# Patient Record
Sex: FEMALE | Race: WHITE | Employment: OTHER | URBAN - METROPOLITAN AREA
[De-identification: names, ages, dates, MRNs, and addresses within clinical notes are randomized per-mention and may not be internally consistent; named-entity substitution may affect disease eponyms.]

---

## 2017-03-16 ENCOUNTER — TELEPHONE (OUTPATIENT)
Dept: INTERNAL MEDICINE CLINIC | Facility: CLINIC | Age: 72
End: 2017-03-16

## 2017-03-16 RX ORDER — AMLODIPINE BESYLATE 10 MG/1
TABLET ORAL
Qty: 30 TABLET | Refills: 1 | Status: SHIPPED | OUTPATIENT
Start: 2017-03-16 | End: 2017-05-14

## 2017-03-16 NOTE — TELEPHONE ENCOUNTER
Please contact the patient. Needs to make appointment in  next 1 or 2 months. Given 2 refills at this time.

## 2017-03-16 NOTE — TELEPHONE ENCOUNTER
Pt is eligible for a Medicare Annual Health Assessment anytime during the next 12 months. Pt declined stating that she is wanting to change PCP's at this time and will notify insurance company of this.

## 2017-04-14 PROBLEM — F17.201 TOBACCO DEPENDENCE IN REMISSION: Status: ACTIVE | Noted: 2017-04-14

## 2017-04-14 PROBLEM — N18.30 CHRONIC RENAL DISEASE, STAGE III (HCC): Status: ACTIVE | Noted: 2017-04-14

## 2017-04-14 PROBLEM — R32 URINARY INCONTINENCE: Status: ACTIVE | Noted: 2017-04-14

## 2017-04-14 PROBLEM — E78.5 HYPERLIPIDEMIA: Status: ACTIVE | Noted: 2017-04-14

## 2017-05-11 ENCOUNTER — OFFICE VISIT (OUTPATIENT)
Dept: INTERNAL MEDICINE CLINIC | Facility: CLINIC | Age: 72
End: 2017-05-11

## 2017-05-11 VITALS
WEIGHT: 140.5 LBS | HEIGHT: 57.5 IN | DIASTOLIC BLOOD PRESSURE: 74 MMHG | SYSTOLIC BLOOD PRESSURE: 168 MMHG | OXYGEN SATURATION: 95 % | TEMPERATURE: 98 F | BODY MASS INDEX: 29.9 KG/M2 | RESPIRATION RATE: 20 BRPM | HEART RATE: 66 BPM

## 2017-05-11 DIAGNOSIS — H25.13 AGE-RELATED NUCLEAR CATARACT OF BOTH EYES: ICD-10-CM

## 2017-05-11 DIAGNOSIS — J06.9 UPPER RESPIRATORY TRACT INFECTION, UNSPECIFIED TYPE: Primary | ICD-10-CM

## 2017-05-11 PROCEDURE — G0463 HOSPITAL OUTPT CLINIC VISIT: HCPCS | Performed by: INTERNAL MEDICINE

## 2017-05-11 PROCEDURE — 99213 OFFICE O/P EST LOW 20 MIN: CPT | Performed by: INTERNAL MEDICINE

## 2017-05-11 RX ORDER — AZITHROMYCIN 250 MG/1
TABLET, FILM COATED ORAL
Qty: 6 TABLET | Refills: 0 | Status: SHIPPED | OUTPATIENT
Start: 2017-05-11 | End: 2017-05-15

## 2017-05-11 NOTE — PROGRESS NOTES
HPI:    Patient ID: Adam Arias is a 70year old female. HPI  Patient is here complaining of almost 1 week of cough and respiratory infection symptoms. Started with losing her voice and difficulty with throat. She has had chills since the start. Oropharynx is clear and moist.   Eyes: Conjunctivae are normal.   Pulmonary/Chest: Effort normal and breath sounds normal. She has no wheezes. She has no rales. Lymphadenopathy:     She has no cervical adenopathy.               ASSESSMENT/PLAN:   Patient

## 2017-05-15 RX ORDER — AMLODIPINE BESYLATE 10 MG/1
TABLET ORAL
Qty: 30 TABLET | Refills: 5 | Status: SHIPPED | OUTPATIENT
Start: 2017-05-15 | End: 2017-11-12

## 2017-05-15 RX ORDER — LOSARTAN POTASSIUM 100 MG/1
TABLET ORAL
Qty: 30 TABLET | Refills: 5 | Status: SHIPPED | OUTPATIENT
Start: 2017-05-15 | End: 2017-11-12

## 2017-05-15 NOTE — TELEPHONE ENCOUNTER
Hypertensive Medications;  PLEASE ADVISE ON REFILL. THANKS.   Protocol Criteria:  · Appointment scheduled in the past 6 months or in the next 3 months  · BMP or CMP in the past 12 months  · Creatinine result < 2  Recent Visits       Provider Department [facility]

## 2017-05-30 PROBLEM — D69.6 THROMBOCYTOPENIA (HCC): Chronic | Status: ACTIVE | Noted: 2017-05-30

## 2017-05-30 PROBLEM — D69.6 THROMBOCYTOPENIA: Chronic | Status: ACTIVE | Noted: 2017-05-30

## 2017-06-08 ENCOUNTER — OFFICE VISIT (OUTPATIENT)
Dept: INTERNAL MEDICINE CLINIC | Facility: CLINIC | Age: 72
End: 2017-06-08

## 2017-06-08 VITALS
TEMPERATURE: 98 F | BODY MASS INDEX: 30.39 KG/M2 | RESPIRATION RATE: 18 BRPM | DIASTOLIC BLOOD PRESSURE: 80 MMHG | HEIGHT: 57 IN | HEART RATE: 60 BPM | WEIGHT: 140.88 LBS | SYSTOLIC BLOOD PRESSURE: 162 MMHG

## 2017-06-08 DIAGNOSIS — Z00.00 ENCOUNTER FOR ANNUAL HEALTH EXAMINATION: Primary | ICD-10-CM

## 2017-06-08 DIAGNOSIS — R32 URINARY INCONTINENCE, UNSPECIFIED TYPE: ICD-10-CM

## 2017-06-08 DIAGNOSIS — H52.203 HYPEROPIA WITH ASTIGMATISM AND PRESBYOPIA, BILATERAL: ICD-10-CM

## 2017-06-08 DIAGNOSIS — I83.93 VARICOSE VEINS OF BOTH LOWER EXTREMITIES: ICD-10-CM

## 2017-06-08 DIAGNOSIS — E78.5 HYPERLIPIDEMIA, UNSPECIFIED HYPERLIPIDEMIA TYPE: ICD-10-CM

## 2017-06-08 DIAGNOSIS — D69.6 THROMBOCYTOPENIA (HCC): Chronic | ICD-10-CM

## 2017-06-08 DIAGNOSIS — R20.2 NUMBNESS AND TINGLING OF LEFT SIDE OF FACE: ICD-10-CM

## 2017-06-08 DIAGNOSIS — R20.0 NUMBNESS AND TINGLING OF LEFT SIDE OF FACE: ICD-10-CM

## 2017-06-08 DIAGNOSIS — H52.03 HYPEROPIA WITH ASTIGMATISM AND PRESBYOPIA, BILATERAL: ICD-10-CM

## 2017-06-08 DIAGNOSIS — N18.30 CHRONIC RENAL DISEASE, STAGE III (HCC): ICD-10-CM

## 2017-06-08 DIAGNOSIS — H25.13 AGE-RELATED NUCLEAR CATARACT OF BOTH EYES: ICD-10-CM

## 2017-06-08 DIAGNOSIS — I10 ESSENTIAL HYPERTENSION WITH GOAL BLOOD PRESSURE LESS THAN 140/90: ICD-10-CM

## 2017-06-08 DIAGNOSIS — R21 RASH: ICD-10-CM

## 2017-06-08 DIAGNOSIS — H52.4 HYPEROPIA WITH ASTIGMATISM AND PRESBYOPIA, BILATERAL: ICD-10-CM

## 2017-06-08 DIAGNOSIS — H43.391 FLOATER, VITREOUS, RIGHT: ICD-10-CM

## 2017-06-08 PROBLEM — F17.201 TOBACCO DEPENDENCE IN REMISSION: Status: RESOLVED | Noted: 2017-04-14 | Resolved: 2017-06-08

## 2017-06-08 PROCEDURE — 96160 PT-FOCUSED HLTH RISK ASSMT: CPT | Performed by: INTERNAL MEDICINE

## 2017-06-08 NOTE — PATIENT INSTRUCTIONS
Shelby Ortiz's SCREENING SCHEDULE   Tests on this list are recommended by your physician but may not be covered, or covered at this frequency, by your insurer. Please check with your insurance carrier before scheduling to verify coverage.    PREVENTATI family history    Colorectal Cancer Screening  Covered up to Age 76     Colonoscopy Screen   Covered every 10 years- more often if abnormal Colonoscopy,3 Years due on 09/01/2019 Update Health Maintenance if applicable    Flex Sigmoidoscopy Screen  Covered this or any previous visit. Please get once after your 65th birthday    Pneumococcal 23 (Pneumovax)  Covered Once after 65 No orders found for this or any previous visit.  Please get once after your 65th birthday    Hepatitis B for Moderate/High Risk

## 2017-06-08 NOTE — PROGRESS NOTES
HPI:   Laureen Garcia is a 70year old female who presents for a MA (Medicare Advantage) Supervisit (Once per calendar year). Patient is here for Medicare advantage AHA visit and follow-up on chronic medical issues as listed below.   Last seen in the AST 17 10/07/2016   ALT 18 10/07/2016   CA 9.1 10/07/2016   ALB 4.0 10/07/2016   ALB 4.24 10/07/2016   TSH 2.14 10/07/2016   CREATSERUM 1.26 10/07/2016   * 10/07/2016        CBC  (most recent labs)     Lab Results  Component Value Date   WBC 5.4 1 double vision  HEENT: denies nasal congestion, sinus pain or ST  LUNGS: denies shortness of breath with exertion  CARDIOVASCULAR: denies chest pain on exertion  GI: denies abdominal pain, denies heartburn  : denies dysuria, vaginal discharge or itching, responses:    No I avoid social activities because I cannot hear well and fear I will   reply improperly:  No   Family members and friends have told me they think I may have hearing   loss:   Yes             Visual Acuity  Right Eye Visual Acuity: Corrected encounter diagnosis)  Plan: Patient here for Medicare advantage AHA visit. Exam is unremarkable. Multiple active issues as below. Health maintenance issues reviewed.   Advanced directives reviewed.    (I10) Essential hypertension with goal blood pressure Ongoing itchiness and rash. Refer to dermatology. Ms. Denilson Bazan already takes aspirin and has it on her medication list.     Diet assessment: good     Advanced Directive:  Living Will on file in [address]?   Fredda Sever does not have a Living Will on conditions?: 0-No    Have you fallen in the last 12 months?: 0-No    Do you accidently lose urine?: 1-Yes    Do you have difficulty seeing?: 0-No    Do you have any difficulty walking or getting up?: 0-No    Do you have any tripping hazards?: 0-No Maintenance if applicable    Flex Sigmoidoscopy Screen every 10 years No results found for this or any previous visit. No flowsheet data found. Fecal Occult Blood Annually No results found for: FOB No flowsheet data found.     Glaucoma Screening      Op covered with your pharmacy  prescription benefits        SPECIFIC DISEASE MONITORING Internal Lab or Procedure External Lab or Procedure   Annual Monitoring of Persistent     Medications (ACE/ARB, digoxin diuretics, anticonvulsants.)    Potassium  Annually

## 2017-06-09 ENCOUNTER — LAB ENCOUNTER (OUTPATIENT)
Dept: LAB | Facility: HOSPITAL | Age: 72
End: 2017-06-09
Attending: INTERNAL MEDICINE
Payer: MEDICARE

## 2017-06-09 DIAGNOSIS — I10 ESSENTIAL HYPERTENSION WITH GOAL BLOOD PRESSURE LESS THAN 140/90: ICD-10-CM

## 2017-06-09 PROCEDURE — 36415 COLL VENOUS BLD VENIPUNCTURE: CPT

## 2017-06-09 PROCEDURE — 85025 COMPLETE CBC W/AUTO DIFF WBC: CPT

## 2017-06-09 PROCEDURE — 80061 LIPID PANEL: CPT

## 2017-06-09 PROCEDURE — 80053 COMPREHEN METABOLIC PANEL: CPT

## 2017-07-14 ENCOUNTER — APPOINTMENT (OUTPATIENT)
Dept: LAB | Facility: HOSPITAL | Age: 72
End: 2017-07-14
Attending: Other
Payer: MEDICARE

## 2017-07-14 ENCOUNTER — OFFICE VISIT (OUTPATIENT)
Dept: NEUROLOGY | Facility: CLINIC | Age: 72
End: 2017-07-14

## 2017-07-14 ENCOUNTER — HOSPITAL ENCOUNTER (OUTPATIENT)
Dept: GENERAL RADIOLOGY | Facility: HOSPITAL | Age: 72
Discharge: HOME | End: 2017-07-14
Attending: Other
Payer: MEDICARE

## 2017-07-14 VITALS
WEIGHT: 140 LBS | RESPIRATION RATE: 16 BRPM | DIASTOLIC BLOOD PRESSURE: 66 MMHG | OXYGEN SATURATION: 98 % | SYSTOLIC BLOOD PRESSURE: 140 MMHG | HEART RATE: 54 BPM | HEIGHT: 55 IN | BODY MASS INDEX: 32.4 KG/M2

## 2017-07-14 DIAGNOSIS — M54.12 CERVICAL RADICULOPATHY: ICD-10-CM

## 2017-07-14 DIAGNOSIS — M79.10 MYALGIA: ICD-10-CM

## 2017-07-14 DIAGNOSIS — M54.12 CERVICAL RADICULOPATHY: Primary | ICD-10-CM

## 2017-07-14 LAB
CK SERPL-CCNC: 68 U/L (ref 38–234)
ERYTHROCYTE [SEDIMENTATION RATE] IN BLOOD: 7 MM/HR (ref 0–30)

## 2017-07-14 PROCEDURE — 72050 X-RAY EXAM NECK SPINE 4/5VWS: CPT | Performed by: OTHER

## 2017-07-14 PROCEDURE — 99213 OFFICE O/P EST LOW 20 MIN: CPT | Performed by: OTHER

## 2017-07-14 PROCEDURE — 86038 ANTINUCLEAR ANTIBODIES: CPT

## 2017-07-14 PROCEDURE — 36415 COLL VENOUS BLD VENIPUNCTURE: CPT

## 2017-07-14 PROCEDURE — 85652 RBC SED RATE AUTOMATED: CPT

## 2017-07-14 PROCEDURE — 82550 ASSAY OF CK (CPK): CPT

## 2017-07-14 RX ORDER — MELATONIN
1000 DAILY
Qty: 30 TABLET | Refills: 3 | Status: SHIPPED | OUTPATIENT
Start: 2017-07-14

## 2017-07-14 NOTE — PROGRESS NOTES
Manish Estevez : 1945     HPI:   Patient presents with: Follow - Up: LOV 16. Pt hre to discuss MRI brain. Pt continues to have numbness in fingers of both hands. Pt having pain in both sides of neck to both shoulders. Pain to right arm.  Ti 30 tablet Rfl: 5   Nebivolol HCl (BYSTOLIC) 10 MG Oral Tab Take 20 mg by mouth daily. Disp:  Rfl:    Multiple Vitamins-Minerals (MULTI-VITAMIN/MINERALS) Oral Tab Take 1 tablet by mouth daily.  Disp:  Rfl:    Naproxen Sodium (ALEVE) 220 MG Oral Tab Take 1-2 130/75. General appearance: In no distress. Restriction of cervical mobility, especially extension. CV: No  Evidence of Carotid Bruits, Regular Rate and Rhythm.         Neuro:  Higher Integrative Functions:  Alert and cooperative, with normal attention s call me once these test results are available. Thank you very much. Return in about 3 months (around 10/14/2017). Sixto Gaston.  Lillian Rosales MD

## 2017-07-18 LAB — ANA SER QL: NEGATIVE

## 2017-07-31 ENCOUNTER — TELEPHONE (OUTPATIENT)
Dept: NEUROLOGY | Facility: CLINIC | Age: 72
End: 2017-07-31

## 2017-07-31 ENCOUNTER — PATIENT OUTREACH (OUTPATIENT)
Dept: INTERNAL MEDICINE CLINIC | Facility: CLINIC | Age: 72
End: 2017-07-31

## 2017-07-31 DIAGNOSIS — M54.12 CERVICAL RADICULOPATHY: Primary | ICD-10-CM

## 2017-07-31 NOTE — PROGRESS NOTES
Patient has upcoming PCP appointment. They meet criteria for the CCM program.  Please review and if you agree, refer to Chronic Care Management. Please let patient know someone will be contacting them.

## 2017-08-03 ENCOUNTER — OFFICE VISIT (OUTPATIENT)
Dept: INTERNAL MEDICINE CLINIC | Facility: CLINIC | Age: 72
End: 2017-08-03

## 2017-08-03 VITALS
SYSTOLIC BLOOD PRESSURE: 168 MMHG | BODY MASS INDEX: 31.68 KG/M2 | RESPIRATION RATE: 18 BRPM | HEART RATE: 60 BPM | WEIGHT: 140.81 LBS | HEIGHT: 56 IN | TEMPERATURE: 98 F | DIASTOLIC BLOOD PRESSURE: 76 MMHG

## 2017-08-03 DIAGNOSIS — E78.5 HYPERLIPIDEMIA, UNSPECIFIED HYPERLIPIDEMIA TYPE: ICD-10-CM

## 2017-08-03 DIAGNOSIS — M17.10 ARTHRITIS OF KNEE: ICD-10-CM

## 2017-08-03 DIAGNOSIS — I10 ESSENTIAL HYPERTENSION WITH GOAL BLOOD PRESSURE LESS THAN 140/90: Primary | ICD-10-CM

## 2017-08-03 PROCEDURE — 99214 OFFICE O/P EST MOD 30 MIN: CPT | Performed by: INTERNAL MEDICINE

## 2017-08-03 PROCEDURE — G0463 HOSPITAL OUTPT CLINIC VISIT: HCPCS | Performed by: INTERNAL MEDICINE

## 2017-08-03 NOTE — PATIENT INSTRUCTIONS
For blood pressure-  In the morning, take the losartan and the hydrochlorothiazide. In the evening, take the amlodipine and the Bystolic. Check blood pressure and call us with the results in 2 weeks.

## 2017-08-07 ENCOUNTER — TELEPHONE (OUTPATIENT)
Dept: NEUROLOGY | Facility: CLINIC | Age: 72
End: 2017-08-07

## 2017-08-07 NOTE — TELEPHONE ENCOUNTER
Received e mail from [email or web address]  advising of approval for Physical therapy. . Will call Pt. to inform. Pt. infoemd * PT sesions were approved.

## 2017-08-08 ENCOUNTER — OFFICE VISIT (OUTPATIENT)
Dept: PHYSICAL THERAPY | Age: 72
End: 2017-08-08
Attending: Other
Payer: MEDICARE

## 2017-08-08 DIAGNOSIS — M54.12 CERVICAL RADICULOPATHY: ICD-10-CM

## 2017-08-08 PROCEDURE — 97162 PT EVAL MOD COMPLEX 30 MIN: CPT | Performed by: PHYSICAL THERAPIST

## 2017-08-08 PROCEDURE — 97110 THERAPEUTIC EXERCISES: CPT | Performed by: PHYSICAL THERAPIST

## 2017-08-08 NOTE — PROGRESS NOTES
CERVICAL SPINE EVALUATION:   Referring Physician: Claudio Saenz MD  Diagnosis: Cervical radiculopathy (M54.12) Date of Service: 8/8/2017   Date of Onset: 3 Month ago        SUBJECTIVE:   PATIENT SUMMARY:    Mirza Weston is a 70year old y/o female enable easier home, work, functional, and recreational activities. 3. Patient to have WNL of CROM in all directions to allow a return to all normal activities (reading, taking care of grad children and dogs) without compensation of deviation.    4. April

## 2017-08-10 ENCOUNTER — OFFICE VISIT (OUTPATIENT)
Dept: PHYSICAL THERAPY | Age: 72
End: 2017-08-10
Attending: Other
Payer: MEDICARE

## 2017-08-10 DIAGNOSIS — M54.12 CERVICAL RADICULOPATHY: ICD-10-CM

## 2017-08-10 PROCEDURE — 97110 THERAPEUTIC EXERCISES: CPT | Performed by: PHYSICAL THERAPIST

## 2017-08-10 NOTE — PROGRESS NOTES
Date: 8/10/2017     Dx: Cervical radiculopathy (M54.12)         Authorized # of Visits:  8       Referring MD: Mallorie Benedict MD visit: none scheduled    Medication Changes since last visit?: No    Subjective: Patient reports that her neck feels the same. activities. 3. Patient to have WNL of CROM in all directions to allow a return to all normal activities (reading, taking care of grad children and dogs) without compensation of deviation.    4. Patient to consistently have good posture to promote their sy

## 2017-08-14 ENCOUNTER — APPOINTMENT (OUTPATIENT)
Dept: PHYSICAL THERAPY | Age: 72
End: 2017-08-14
Attending: Other
Payer: MEDICARE

## 2017-08-14 NOTE — PROGRESS NOTES
HPI:    Patient ID: Keshav Marroquin is a 70year old female. HPI  Patient is here for follow-up on chronic medical issues. Seen here last for the CSF - [place] Medicare advantage visit.   Blood pressure was a little bit elevated at that time but no changes were m Genitourinary: Negative for dysuria, hematuria, vaginal discharge, menstrual problem and sexual dysfunction. Musculoskeletal: Positive for joint pain. Skin: Positive for rash. Neurological: Positive for weakness and numbness.  Negative for headaches normal and breath sounds normal. She has no wheezes. She has no rales. Abdominal: Soft. Bowel sounds are normal. She exhibits no mass. There is no tenderness. Musculoskeletal: She exhibits no tenderness.    Lymphadenopathy:     She has no cervical adeno

## 2017-08-16 ENCOUNTER — OFFICE VISIT (OUTPATIENT)
Dept: PHYSICAL THERAPY | Age: 72
End: 2017-08-16
Attending: Other
Payer: MEDICARE

## 2017-08-16 DIAGNOSIS — M54.12 CERVICAL RADICULOPATHY: ICD-10-CM

## 2017-08-16 PROCEDURE — 97110 THERAPEUTIC EXERCISES: CPT | Performed by: PHYSICAL THERAPIST

## 2017-08-16 NOTE — PROGRESS NOTES
Date: 8/16/2017     Dx: Cervical radiculopathy (M54.12)         Authorized # of Visits:  8       Referring MD: Raji Morales  Next MD visit: none scheduled    Medication Changes since last visit?: No    Subjective: Patient states that she does not have any pain promote good sitting posture. Goals:   Goals     • Therapy Goals            1. Patient to consistently perform their HEP and it's progression to maintain their improved condition.   2.  Patient to have reduced, centralized, and abolished symptoms in t

## 2017-08-23 ENCOUNTER — OFFICE VISIT (OUTPATIENT)
Dept: PHYSICAL THERAPY | Age: 72
End: 2017-08-23
Attending: Other
Payer: MEDICARE

## 2017-08-23 DIAGNOSIS — M54.12 CERVICAL RADICULOPATHY: ICD-10-CM

## 2017-08-23 PROCEDURE — 97110 THERAPEUTIC EXERCISES: CPT | Performed by: PHYSICAL THERAPIST

## 2017-08-23 NOTE — PROGRESS NOTES
Date: 8/23/2017     Dx: Cervical radiculopathy (M54.12)         Authorized # of Visits:  8       Referring MD: Autumn Nguyen  Next MD visit: none scheduled    Medication Changes since last visit?: No    Subjective: Patient is painfree at this time.   She felt a reps; NE     + \"wiggles\" 5 x 5 mobs; P, NW mid neck (better)    (B) UE n.row, ext., w.row greenTB 10 x 5 cts ea    CC: (R/L) D1D2 flex/ext x 2.5 lbs x 10-20 reps ea    TM: Retro 3% grade x 0.4 mph x 5 mins; NE  (CGA-SBA)    Seated: c/s extension x 10 rep

## 2017-08-25 ENCOUNTER — OFFICE VISIT (OUTPATIENT)
Dept: PHYSICAL THERAPY | Age: 72
End: 2017-08-25
Attending: Other
Payer: MEDICARE

## 2017-08-25 DIAGNOSIS — M54.12 CERVICAL RADICULOPATHY: ICD-10-CM

## 2017-08-25 PROCEDURE — 97110 THERAPEUTIC EXERCISES: CPT | Performed by: PHYSICAL THERAPIST

## 2017-08-25 NOTE — PROGRESS NOTES
Date: 8/25/2017     Dx: Cervical radiculopathy (M54.12)         Authorized # of Visits:  8       Referring MD: Kaleb Holly  Next MD visit: none scheduled    Medication Changes since last visit?: No    Subjective: Patient is painfree and has WNL of CROM.   But 5 x 5 mobs; P, NW mid neck (better)    (B) UE n.row, ext., w.row greenTB 10 x 5 cts ea    CC: (R/L) D1D2 flex/ext x 2.5 lbs x 10-20 reps ea    TM: Retro 3% grade x 0.4 mph x 5 mins; NE  (CGA-SBA)    Seated: c/s extension x 10 reps; NE    Prone: sphinx x 1

## 2017-08-29 ENCOUNTER — OFFICE VISIT (OUTPATIENT)
Dept: PHYSICAL THERAPY | Age: 72
End: 2017-08-29
Attending: Other
Payer: MEDICARE

## 2017-08-29 DIAGNOSIS — M54.12 CERVICAL RADICULOPATHY: ICD-10-CM

## 2017-08-29 PROCEDURE — 97110 THERAPEUTIC EXERCISES: CPT | Performed by: PHYSICAL THERAPIST

## 2017-08-29 NOTE — PROGRESS NOTES
Date: 8/29/2017     Dx: Cervical radiculopathy (M54.12)         Authorized # of Visits:  8       Referring MD: Debbie Paniagua  Next MD visit: none scheduled    Medication Changes since last visit?: No    Subjective: Patient reports that she went for a long drive NE     + \"wiggles\" 5 x 5 mobs; P, NW mid neck (better)    (B) UE n.row, ext., w.row greenTB 10 x 5 cts ea    CC: (R/L) D1D2 flex/ext x 2.5 lbs x 10-20 reps ea    TM: Retro 3% grade x 0.4 mph x 5 mins; NE  (CGA-SBA)    Seated: c/s extension x 10 reps; NE functional, and recreational activities. 3. Patient to have WNL of CROM in all directions to allow a return to all normal activities (reading, taking care of grad children and dogs) without compensation of deviation.    4. Patient to consistently have goo

## 2017-08-31 ENCOUNTER — OFFICE VISIT (OUTPATIENT)
Dept: PHYSICAL THERAPY | Age: 72
End: 2017-08-31
Attending: Other
Payer: MEDICARE

## 2017-08-31 ENCOUNTER — APPOINTMENT (OUTPATIENT)
Dept: PHYSICAL THERAPY | Age: 72
End: 2017-08-31
Attending: Other
Payer: MEDICARE

## 2017-08-31 PROCEDURE — 97110 THERAPEUTIC EXERCISES: CPT | Performed by: PHYSICAL THERAPIST

## 2017-08-31 NOTE — PROGRESS NOTES
Date: 8/31/2017     Dx: Cervical radiculopathy (M54.12)         Authorized # of Visits:  8       Referring MD: Corinne Nones  Next MD visit: none scheduled    Medication Changes since last visit?: No    Subjective: Patient states that she is painfree in the [facility] training    Sitting posture correction with lumbar roll and 4 inch step    + c/s retraction x 10 reps; NE     + self OP x 10 reps; NE     + c/s ext and lean back on chair x 10 reps; NE     + \"wiggles\" 5 x 5 mobs; P, NW mid neck (better)    (B) TONJA tomas, NE    CC: (R/L) D1D2 flex/ext x 2.5 lbs x 20 reps ea; NE    TM: Retro 5% grade x 0.5 mph x 4 mins; NE; postural training    Prone: sphinx on knuckles x 1 min; NE     + c/s ext knuckles to fingertips x 10 reps; P, NW (presure stretch at endrange)          A

## 2017-09-06 ENCOUNTER — APPOINTMENT (OUTPATIENT)
Dept: PHYSICAL THERAPY | Age: 72
End: 2017-09-06
Attending: Other
Payer: MEDICARE

## 2017-09-06 ENCOUNTER — TELEPHONE (OUTPATIENT)
Dept: PHYSICAL THERAPY | Age: 72
End: 2017-09-06

## 2017-09-08 ENCOUNTER — TELEPHONE (OUTPATIENT)
Dept: INTERNAL MEDICINE CLINIC | Facility: CLINIC | Age: 72
End: 2017-09-08

## 2017-09-08 RX ORDER — HYDROCHLOROTHIAZIDE 25 MG/1
25 TABLET ORAL DAILY
Qty: 30 TABLET | Refills: 2 | Status: SHIPPED | OUTPATIENT
Start: 2017-09-08 | End: 2017-12-08

## 2017-09-08 NOTE — TELEPHONE ENCOUNTER
Per pt, she needs a refill on her hydrochlorothiazide , pt is out of med. Current Outpatient Prescriptions:                                            hydrochlorothiazide (HYDRODIURIL) 25 MG Oral Tab Take 1 tablet (25 mg total) by mouth daily.  Disp: 3

## 2017-09-08 NOTE — TELEPHONE ENCOUNTER
Hypertensive Medications: Refilled per protocol    Protocol Criteria:  · Appointment scheduled in the past 6 months or in the next 3 months  · BMP or CMP in the past 12 months  · Creatinine result < 2  Recent Outpatient Visits            1 week ago     [facility]

## 2017-09-13 ENCOUNTER — OFFICE VISIT (OUTPATIENT)
Dept: PHYSICAL THERAPY | Age: 72
End: 2017-09-13
Attending: INTERNAL MEDICINE
Payer: MEDICARE

## 2017-09-13 PROCEDURE — 97110 THERAPEUTIC EXERCISES: CPT | Performed by: PHYSICAL THERAPIST

## 2017-09-13 NOTE — PROGRESS NOTES
Date: 9/13/2017     Dx: Cervical radiculopathy (M54.12)         Authorized # of Visits:  8       Referring MD: No ref.  provider found  Next MD visit: none scheduled    Medication Changes since last visit?: No    Subjective: Patient states that she is painf \"wiggles\" 5 x 5 mobs; P, NW mid neck (better)    (B) UE n.row, ext., w.row greenTB 10 x 5 cts ea    CC: (R/L) D1D2 flex/ext x 2.5 lbs x 10-20 reps ea    TM: Retro 3% grade x 0.4 mph x 5 mins; NE  (CGA-SBA)    Seated: c/s extension x 10 reps; NE    Prone: x 10 reps; P, NW (presure stretch at endrange)   Scifit L2 3f3b; postural training    Sitting posture correction with lumbar roll and foot step     C/s extension with lean back x 10 reps; NE stretch    (B) UE n.row, ext., w.row greenTB 10 x 5 cts ea    TM:

## 2017-09-26 ENCOUNTER — OFFICE VISIT (OUTPATIENT)
Dept: OPHTHALMOLOGY | Facility: CLINIC | Age: 72
End: 2017-09-26

## 2017-09-26 DIAGNOSIS — H43.391 FLOATER, VITREOUS, RIGHT: ICD-10-CM

## 2017-09-26 DIAGNOSIS — H43.391 VITREOUS FLOATERS OF RIGHT EYE: ICD-10-CM

## 2017-09-26 DIAGNOSIS — H25.13 AGE-RELATED NUCLEAR CATARACT OF BOTH EYES: Primary | ICD-10-CM

## 2017-09-26 PROCEDURE — 92014 COMPRE OPH EXAM EST PT 1/>: CPT | Performed by: OPHTHALMOLOGY

## 2017-09-26 PROCEDURE — 92015 DETERMINE REFRACTIVE STATE: CPT | Performed by: OPHTHALMOLOGY

## 2017-09-26 NOTE — PROGRESS NOTES
Baldo Wallace is a 70year old female. HPI:     HPI     Consult    Additional comments: Referred by Dr. Tevin Young.   Patient is here for a complete eye exam.  Patient states that at times she has a decrease in her distance vision Sodium (ALEVE) 220 MG Oral Tab Take 1-2 tablets by mouth daily as needed.  Disp:  Rfl:        Allergies:    Sulfa Antibiotics       Unknown    ROS:     ROS     Positive for: Cardiovascular, Eyes    Negative for: Constitutional, Gastrointestinal, Neurologica 20/20          Final Rx       Sphere Cylinder [place] [facility]    Right +1.75 +1.25 180 20/30 +3.00 20/20    Left +1.75 +0.50 015 20/25- +3.00 20/20    Type:  Progressive bifocal                 ASSESSMENT/PLAN:     Diagnoses and Plan:     Age-rela

## 2017-09-26 NOTE — PATIENT INSTRUCTIONS
Age-related nuclear cataract of both eyes  Discussed early cataracts with patient. No treatment recommended at this time. New glasses today for progressive bifocals.    Reassured patient that other than cataracts, eyes look very healthy; there is no evid

## 2017-09-26 NOTE — ASSESSMENT & PLAN NOTE
Discussed early cataracts with patient. No treatment recommended at this time. New glasses today for progressive bifocals.    Reassured patient that other than cataracts, eyes look very healthy; there is no evidence of glaucoma or macular degeneration in

## 2017-10-24 ENCOUNTER — OFFICE VISIT (OUTPATIENT)
Dept: NEUROLOGY | Facility: CLINIC | Age: 72
End: 2017-10-24

## 2017-10-24 VITALS
RESPIRATION RATE: 16 BRPM | SYSTOLIC BLOOD PRESSURE: 156 MMHG | HEIGHT: 55 IN | BODY MASS INDEX: 31.94 KG/M2 | DIASTOLIC BLOOD PRESSURE: 72 MMHG | WEIGHT: 138 LBS | HEART RATE: 62 BPM

## 2017-10-24 DIAGNOSIS — M54.12 CERVICAL RADICULOPATHY: Primary | ICD-10-CM

## 2017-10-24 DIAGNOSIS — G56.23 ULNAR NEUROPATHY OF BOTH UPPER EXTREMITIES: ICD-10-CM

## 2017-10-24 PROCEDURE — 99213 OFFICE O/P EST LOW 20 MIN: CPT | Performed by: OTHER

## 2017-10-24 NOTE — PROGRESS NOTES
Laureen Garcia : 1945     HPI:   Patient presents with: Follow - Up: pt states that since shes been her last shes had PT and it worked great. pt states that she has no new concerns and is doing ok.        Reyes Rodriguez was seen in follow-up in my medications for this visit. Past Medical History:   Diagnosis Date   • Age-related nuclear cataract of both eyes 11/12/2015   • Floater, vitreous 11/12/2015      History reviewed. No pertinent surgical history.    Family History   Problem Relation Age of Extremities: No edema, no erythema. Good peripheral pulses. Neuro:  Higher Integrative Functions:  Alert and cooperative, with normal attention span and concentration. Speech and language normal.  Oriented times three.   Recent and remote memory intact, w

## 2017-11-01 ENCOUNTER — TELEPHONE (OUTPATIENT)
Dept: NEUROLOGY | Facility: CLINIC | Age: 72
End: 2017-11-01

## 2017-11-02 ENCOUNTER — OFFICE VISIT (OUTPATIENT)
Dept: NEUROLOGY | Facility: CLINIC | Age: 72
End: 2017-11-02

## 2017-11-02 DIAGNOSIS — M54.12 CERVICAL RADICULOPATHY: ICD-10-CM

## 2017-11-02 PROCEDURE — 95911 NRV CNDJ TEST 9-10 STUDIES: CPT | Performed by: PHYSICAL MEDICINE & REHABILITATION

## 2017-11-02 PROCEDURE — 95886 MUSC TEST DONE W/N TEST COMP: CPT | Performed by: PHYSICAL MEDICINE & REHABILITATION

## 2017-11-13 ENCOUNTER — TELEPHONE (OUTPATIENT)
Dept: NEUROLOGY | Facility: CLINIC | Age: 72
End: 2017-11-13

## 2017-11-13 RX ORDER — LOSARTAN POTASSIUM 100 MG/1
TABLET ORAL
Qty: 90 TABLET | Refills: 0 | Status: SHIPPED | OUTPATIENT
Start: 2017-11-13 | End: 2018-02-10

## 2017-11-13 RX ORDER — AMLODIPINE BESYLATE 10 MG/1
TABLET ORAL
Qty: 90 TABLET | Refills: 0 | Status: SHIPPED | OUTPATIENT
Start: 2017-11-13 | End: 2018-02-10

## 2017-11-13 NOTE — TELEPHONE ENCOUNTER
Refilled per protocol      Hypertensive Medications  Protocol Criteria:  · Appointment scheduled in the past 6 months or in the next 3 months  · BMP or CMP in the past 12 months  · Creatinine result < 2  Recent Outpatient Visits            1 week ago [place]

## 2017-11-20 ENCOUNTER — TELEPHONE (OUTPATIENT)
Dept: NEUROLOGY | Facility: CLINIC | Age: 72
End: 2017-11-20

## 2017-11-20 DIAGNOSIS — M54.12 CERVICAL RADICULOPATHY: Primary | ICD-10-CM

## 2017-11-20 NOTE — TELEPHONE ENCOUNTER
Pt. states Dr. Autumn Nguyen wants her to have an MRI of C-spine. No referral/order in Epic. Informed I will request referral then call her to schedule appt.  Will inform Nursing

## 2017-12-08 ENCOUNTER — OFFICE VISIT (OUTPATIENT)
Dept: INTERNAL MEDICINE CLINIC | Facility: CLINIC | Age: 72
End: 2017-12-08

## 2017-12-08 ENCOUNTER — HOSPITAL ENCOUNTER (OUTPATIENT)
Dept: MRI IMAGING | Facility: HOSPITAL | Age: 72
Discharge: HOME | End: 2017-12-08
Attending: Other
Payer: MEDICARE

## 2017-12-08 VITALS
BODY MASS INDEX: 29.75 KG/M2 | HEIGHT: 57 IN | RESPIRATION RATE: 18 BRPM | HEART RATE: 63 BPM | DIASTOLIC BLOOD PRESSURE: 74 MMHG | WEIGHT: 137.88 LBS | TEMPERATURE: 97 F | SYSTOLIC BLOOD PRESSURE: 160 MMHG

## 2017-12-08 DIAGNOSIS — I10 ESSENTIAL HYPERTENSION WITH GOAL BLOOD PRESSURE LESS THAN 140/90: ICD-10-CM

## 2017-12-08 DIAGNOSIS — R30.0 DYSURIA: Primary | ICD-10-CM

## 2017-12-08 DIAGNOSIS — M54.12 CERVICAL RADICULOPATHY: ICD-10-CM

## 2017-12-08 DIAGNOSIS — R25.2 MUSCLE CRAMP: ICD-10-CM

## 2017-12-08 PROCEDURE — 72141 MRI NECK SPINE W/O DYE: CPT | Performed by: OTHER

## 2017-12-08 PROCEDURE — 99213 OFFICE O/P EST LOW 20 MIN: CPT | Performed by: INTERNAL MEDICINE

## 2017-12-08 PROCEDURE — G0463 HOSPITAL OUTPT CLINIC VISIT: HCPCS | Performed by: INTERNAL MEDICINE

## 2017-12-08 RX ORDER — CIPROFLOXACIN 250 MG/1
250 TABLET, FILM COATED ORAL 2 TIMES DAILY
Qty: 14 TABLET | Refills: 0 | Status: SHIPPED | OUTPATIENT
Start: 2017-12-08 | End: 2017-12-15

## 2017-12-08 RX ORDER — HYDROCHLOROTHIAZIDE 25 MG/1
25 TABLET ORAL DAILY
Qty: 90 TABLET | Refills: 3 | Status: SHIPPED | OUTPATIENT
Start: 2017-12-08 | End: 2018-11-21

## 2017-12-08 NOTE — PROGRESS NOTES
HPI:    Patient ID: Kong Mccloud is a 70year old female. HPI  Patient is here with a few different complaints. For the past 2 weeks she has had intermittent chills.   Also notes increased frequency of urination with occasional burning as well as urg Antibiotics       Unknown   PHYSICAL EXAM:   Physical Exam    Constitutional: She appears well-developed and well-nourished. HENT:   Nose: Nose normal.   Mouth/Throat: Oropharynx is clear and moist.   Eyes: Pupils are equal, round, and reactive to light.

## 2017-12-21 ENCOUNTER — TELEPHONE (OUTPATIENT)
Dept: NEUROLOGY | Facility: CLINIC | Age: 72
End: 2017-12-21

## 2018-01-18 ENCOUNTER — TELEPHONE (OUTPATIENT)
Dept: INTERNAL MEDICINE CLINIC | Facility: CLINIC | Age: 73
End: 2018-01-18

## 2018-01-18 DIAGNOSIS — M54.12 CERVICAL RADICULOPATHY: Primary | ICD-10-CM

## 2018-01-18 NOTE — TELEPHONE ENCOUNTER
Pt called requesting a referral for a follow-up visit with Dr Mallorie Olson. Please sign referral if you agree.  Thank you, Managed [facility]

## 2018-01-24 ENCOUNTER — OFFICE VISIT (OUTPATIENT)
Dept: NEUROLOGY | Facility: CLINIC | Age: 73
End: 2018-01-24

## 2018-01-24 VITALS
DIASTOLIC BLOOD PRESSURE: 64 MMHG | RESPIRATION RATE: 16 BRPM | HEART RATE: 68 BPM | BODY MASS INDEX: 29.77 KG/M2 | SYSTOLIC BLOOD PRESSURE: 124 MMHG | WEIGHT: 138 LBS | HEIGHT: 57 IN

## 2018-01-24 DIAGNOSIS — M54.12 CERVICAL RADICULOPATHY: Primary | ICD-10-CM

## 2018-01-24 PROCEDURE — 99213 OFFICE O/P EST LOW 20 MIN: CPT | Performed by: OTHER

## 2018-01-24 NOTE — PROGRESS NOTES
Jeannette Kirk : 1945     HPI:   Patient presents with:  Numbness: LOV: 10/24/17. Patient is here for a f/u on numbness and tingling on bilateral hand fingers. Pt states numbness and tingling starts from the elbows to the fingers right>left.  Pt h TWICE DAILY FOR 14 DAYS Disp: 454 g Rfl: 0   Saline Nasal Spray (SALINE MIST) 0.65 % Nasal Solution 1 spray by Nasal route as needed for congestion. Disp:  Rfl:    Nebivolol HCl (BYSTOLIC) 10 MG Oral Tab Take 20 mg by mouth daily.  Disp:  Rfl:    Multiple V complaints upper or lower extremities  PSYCHE:no depression or anxiety  NEURO: As in HPI    EXAM:     Vitals:  /64 (BP Location: Right arm, Patient Position: Sitting, Cuff Size: adult)   Pulse 68   Resp 16   Ht 57\"   Wt 138 lb   Breastfeeding?  No improve. Lysbeth January.  Mine Solorzano MD

## 2018-01-31 ENCOUNTER — TELEPHONE (OUTPATIENT)
Dept: INTERNAL MEDICINE CLINIC | Facility: CLINIC | Age: 73
End: 2018-01-31

## 2018-02-10 RX ORDER — LOSARTAN POTASSIUM 100 MG/1
TABLET ORAL
Qty: 90 TABLET | Refills: 0 | Status: SHIPPED | OUTPATIENT
Start: 2018-02-10 | End: 2018-05-18

## 2018-02-10 RX ORDER — AMLODIPINE BESYLATE 10 MG/1
TABLET ORAL
Qty: 90 TABLET | Refills: 0 | Status: SHIPPED | OUTPATIENT
Start: 2018-02-10 | End: 2018-05-12

## 2018-02-10 NOTE — TELEPHONE ENCOUNTER
Hypertensive Medications Protocol Passed  Hypertensive Medications  Protocol Criteria:  · Appointment scheduled in the past 6 months or in the next 3 months  · BMP or CMP in the past 12 months  · Creatinine result < 2  Recent Outpatient Visits            2

## 2018-05-12 RX ORDER — AMLODIPINE BESYLATE 10 MG/1
TABLET ORAL
Qty: 90 TABLET | Refills: 0 | Status: SHIPPED | OUTPATIENT
Start: 2018-05-12 | End: 2018-08-11

## 2018-05-12 NOTE — TELEPHONE ENCOUNTER
Hypertensive Medications  Protocol Criteria:  · Appointment scheduled in the past 6 months or in the next 3 months  · BMP or CMP in the past 12 months  · Creatinine result < 2  Recent Outpatient Visits            3 months ago Cervical radiculopathy    [place]

## 2018-05-17 ENCOUNTER — OFFICE VISIT (OUTPATIENT)
Dept: INTERNAL MEDICINE CLINIC | Facility: CLINIC | Age: 73
End: 2018-05-17

## 2018-05-17 VITALS
HEART RATE: 57 BPM | WEIGHT: 141.13 LBS | TEMPERATURE: 98 F | RESPIRATION RATE: 18 BRPM | SYSTOLIC BLOOD PRESSURE: 138 MMHG | HEIGHT: 57 IN | BODY MASS INDEX: 30.45 KG/M2 | DIASTOLIC BLOOD PRESSURE: 69 MMHG

## 2018-05-17 DIAGNOSIS — Z00.00 ENCOUNTER FOR ANNUAL HEALTH EXAMINATION: Primary | ICD-10-CM

## 2018-05-17 DIAGNOSIS — Z12.31 ENCOUNTER FOR SCREENING MAMMOGRAM FOR BREAST CANCER: ICD-10-CM

## 2018-05-17 DIAGNOSIS — M19.90 OSTEOARTHRITIS, UNSPECIFIED OSTEOARTHRITIS TYPE, UNSPECIFIED SITE: ICD-10-CM

## 2018-05-17 DIAGNOSIS — I10 ESSENTIAL HYPERTENSION: ICD-10-CM

## 2018-05-17 DIAGNOSIS — K76.9 LIVER LESION: ICD-10-CM

## 2018-05-17 DIAGNOSIS — E78.5 HYPERLIPIDEMIA, UNSPECIFIED HYPERLIPIDEMIA TYPE: ICD-10-CM

## 2018-05-17 DIAGNOSIS — M54.12 CERVICAL RADICULOPATHY: ICD-10-CM

## 2018-05-17 DIAGNOSIS — D69.6 THROMBOCYTOPENIA (HCC): ICD-10-CM

## 2018-05-17 DIAGNOSIS — N18.30 CKD (CHRONIC KIDNEY DISEASE) STAGE 3, GFR 30-59 ML/MIN (HCC): ICD-10-CM

## 2018-05-17 PROBLEM — R32 URINARY INCONTINENCE: Status: RESOLVED | Noted: 2017-04-14 | Resolved: 2018-05-17

## 2018-05-17 PROCEDURE — G0439 PPPS, SUBSEQ VISIT: HCPCS | Performed by: INTERNAL MEDICINE

## 2018-05-17 PROCEDURE — 96160 PT-FOCUSED HLTH RISK ASSMT: CPT | Performed by: INTERNAL MEDICINE

## 2018-05-17 NOTE — PATIENT INSTRUCTIONS
Jarred Ortiz's SCREENING SCHEDULE   Tests on this list are recommended by your physician but may not be covered, or covered at this frequency, by your insurer. Please check with your insurance carrier before scheduling to verify coverage.    PREVENTATI Anyone with a family history    Colorectal Cancer Screening  Covered up to Age 76     Colonoscopy Screen   Covered every 10 years- more often if abnormal Colonoscopy,3 Years due on 09/01/2019 Update Health Maintenance if applicable    Flex Sigmoidoscopy Sc found for this or any previous visit. Please get once after your 65th birthday    Pneumococcal 23 (Pneumovax)  Covered Once after 65 No orders found for this or any previous visit.  Please get once after your 65th birthday    Hepatitis B for Moderate/High R

## 2018-05-17 NOTE — PROGRESS NOTES
S  HPI:   Kong Mccloud is a 67year old female who presents for a MA (Medicare Advantage) Supervisit (Once per calendar year).     Patient is here for Medicare advantage AHA visit and follow-up on chronic medical issues including hypertension, low platel pain affect your day to day activities?: 1-Yes  Have you had any memory issues?: 0-No  Fall/Risk Scorin  Scoring Interpretation: 4+ At Risk      Cognitive Assessment   She had a completely normal cognitive assessment- see flowsheet entries    Functiona incontinence     Chronic renal disease, stage III     Thrombocytopenia (HCC)     Cervical radiculopathy    Wt Readings from Last 3 Encounters:  05/17/18 : 141 lb 1.6 oz (64 kg)  01/24/18 : 138 lb (62.6 kg)  12/08/17 : 137 lb 14.4 oz (62.6 kg)     Last Chol (11/12/2015) and Floater, vitreous (11/12/2015). She  has no past surgical history on file. Her family history includes Diabetes in her mother. SOCIAL HISTORY:   She  reports that she quit smoking about 35 years ago.  She has a 9.00 pack-year smokin No I have to strain to understand conversations:  No   I have to worry about missing the telephone ring or doorbell:  No I have trouble hearing conversations in a noisy background such as a crowded room or restaurant:  Sometimes   I get confused about whe normal. She exhibits no mass. There is no tenderness. Musculoskeletal: She exhibits no tenderness. Lymphadenopathy:     She has no cervical adenopathy. Neurological: She is alert. No cranial nerve deficit.  Coordination normal.   Skin: Skin is warm an medications.    (K76.9) Liver lesion  Plan: US ABDOMEN LIMITED (CPT=76705)         Lesion noted on MRI. Somewhat unusual in that they were noting a liver lesion on the cervical MRI.   Nonetheless, ultrasound of the liver was ordered.    (Z12.31) Encounter 10 years No results found for this or any previous visit. No flowsheet data found. Fecal Occult Blood Annually No results found for: FOB No flowsheet data found.     Glaucoma Screening      Ophthalmology Visit Annually: Diabetics, FHx Glaucoma, AA>50, H benefits      SPECIFIC DISEASE MONITORING Internal Lab or Procedure External Lab or Procedure      Annual Monitoring of Persistent     Medications (ACE/ARB, digoxin diuretics, anticonvulsants.)    Potassium  Annually Potassium (mmol/L)   Date Value   06/09

## 2018-05-18 RX ORDER — LOSARTAN POTASSIUM 100 MG/1
TABLET ORAL
Qty: 90 TABLET | Refills: 0 | Status: SHIPPED | OUTPATIENT
Start: 2018-05-18 | End: 2018-08-25

## 2018-05-22 ENCOUNTER — LAB ENCOUNTER (OUTPATIENT)
Dept: LAB | Facility: HOSPITAL | Age: 73
End: 2018-05-22
Attending: INTERNAL MEDICINE
Payer: MEDICARE

## 2018-05-22 DIAGNOSIS — I10 ESSENTIAL HYPERTENSION: ICD-10-CM

## 2018-05-22 PROCEDURE — 36415 COLL VENOUS BLD VENIPUNCTURE: CPT

## 2018-05-22 PROCEDURE — 84443 ASSAY THYROID STIM HORMONE: CPT

## 2018-05-22 PROCEDURE — 80053 COMPREHEN METABOLIC PANEL: CPT

## 2018-05-22 PROCEDURE — 80061 LIPID PANEL: CPT

## 2018-05-22 PROCEDURE — 82607 VITAMIN B-12: CPT

## 2018-05-22 PROCEDURE — 85025 COMPLETE CBC W/AUTO DIFF WBC: CPT

## 2018-05-24 ENCOUNTER — HOSPITAL ENCOUNTER (OUTPATIENT)
Dept: ULTRASOUND IMAGING | Age: 73
Discharge: HOME | End: 2018-05-24
Attending: INTERNAL MEDICINE
Payer: MEDICARE

## 2018-05-24 DIAGNOSIS — K76.9 LIVER LESION: ICD-10-CM

## 2018-05-24 PROCEDURE — 76705 ECHO EXAM OF ABDOMEN: CPT | Performed by: INTERNAL MEDICINE

## 2018-05-25 ENCOUNTER — HOSPITAL ENCOUNTER (OUTPATIENT)
Dept: MAMMOGRAPHY | Age: 73
Discharge: HOME | End: 2018-05-25
Attending: INTERNAL MEDICINE
Payer: MEDICARE

## 2018-05-25 ENCOUNTER — TELEPHONE (OUTPATIENT)
Dept: OTHER | Age: 73
End: 2018-05-25

## 2018-05-25 DIAGNOSIS — Z12.31 ENCOUNTER FOR SCREENING MAMMOGRAM FOR BREAST CANCER: ICD-10-CM

## 2018-05-25 PROCEDURE — 77067 SCR MAMMO BI INCL CAD: CPT | Performed by: INTERNAL MEDICINE

## 2018-05-25 NOTE — TELEPHONE ENCOUNTER
David Carrion MD  P Em Rn Triage             Call pt. THe US shows that the lesions in the liver on recent lumbar MRI correspond to cycsts in the liver. THese are benign and nothing to worry about. No evidence of cancer.  There are stones in the gall dee

## 2018-05-25 NOTE — TELEPHONE ENCOUNTER
Pt returned call, verified name and . Reviewed US results per doctor's instructions. Pt states she did have pain in her back and stomach previously but is not having any pain today. Pt is asking needs to be done at this time.

## 2018-05-25 NOTE — TELEPHONE ENCOUNTER
Nothing else needs to be done at this time. Contact us if the pain returns. Pain from the gallstones typically causes pain in the right upper quadrant that is crampy and intermittent. Usually brought on or worsened with fatty foods.

## 2018-05-30 ENCOUNTER — LAB ENCOUNTER (OUTPATIENT)
Dept: LAB | Age: 73
End: 2018-05-30
Attending: INTERNAL MEDICINE
Payer: MEDICARE

## 2018-05-30 DIAGNOSIS — N18.4 CKD (CHRONIC KIDNEY DISEASE) STAGE 4, GFR 15-29 ML/MIN (HCC): ICD-10-CM

## 2018-05-30 PROCEDURE — 81003 URINALYSIS AUTO W/O SCOPE: CPT

## 2018-05-31 ENCOUNTER — HOSPITAL ENCOUNTER (OUTPATIENT)
Dept: ULTRASOUND IMAGING | Age: 73
Discharge: HOME | End: 2018-05-31
Attending: INTERNAL MEDICINE
Payer: MEDICARE

## 2018-05-31 DIAGNOSIS — N18.4 CKD (CHRONIC KIDNEY DISEASE) STAGE 4, GFR 15-29 ML/MIN (HCC): ICD-10-CM

## 2018-05-31 PROCEDURE — 76770 US EXAM ABDO BACK WALL COMP: CPT | Performed by: INTERNAL MEDICINE

## 2018-06-07 ENCOUNTER — TELEPHONE (OUTPATIENT)
Dept: INTERNAL MEDICINE CLINIC | Facility: CLINIC | Age: 73
End: 2018-06-07

## 2018-06-07 NOTE — TELEPHONE ENCOUNTER
Please contact the patient and go over the results as well as my interpretation as it appears in the lab letter dated June 5.

## 2018-06-09 NOTE — TELEPHONE ENCOUNTER
Verified name and . Results/recommendations reviewed with pt and pt verb understanding             The ultrasound study of the kidneys show some mild changes but nothing significant. No stones, blockage, or tumors.   No evidence of serious kidney issue

## 2018-07-17 ENCOUNTER — OFFICE VISIT (OUTPATIENT)
Dept: INTERNAL MEDICINE CLINIC | Facility: CLINIC | Age: 73
End: 2018-07-17
Payer: MEDICARE

## 2018-07-17 VITALS
WEIGHT: 138 LBS | HEIGHT: 57 IN | DIASTOLIC BLOOD PRESSURE: 64 MMHG | TEMPERATURE: 98 F | BODY MASS INDEX: 29.77 KG/M2 | SYSTOLIC BLOOD PRESSURE: 138 MMHG | HEART RATE: 57 BPM

## 2018-07-17 DIAGNOSIS — G89.29 CHRONIC BILATERAL LOW BACK PAIN WITH LEFT-SIDED SCIATICA: Primary | ICD-10-CM

## 2018-07-17 DIAGNOSIS — M54.42 CHRONIC BILATERAL LOW BACK PAIN WITH LEFT-SIDED SCIATICA: Primary | ICD-10-CM

## 2018-07-17 DIAGNOSIS — B00.9 HERPES: ICD-10-CM

## 2018-07-17 PROCEDURE — 99203 OFFICE O/P NEW LOW 30 MIN: CPT | Performed by: PHYSICIAN ASSISTANT

## 2018-07-17 RX ORDER — VALACYCLOVIR HYDROCHLORIDE 1 G/1
2 TABLET, FILM COATED ORAL EVERY 12 HOURS SCHEDULED
Qty: 4 TABLET | Refills: 0 | Status: SHIPPED | OUTPATIENT
Start: 2018-07-17 | End: 2018-08-10

## 2018-07-17 NOTE — PROGRESS NOTES
HPI:    Patient ID: Juliana Owusu is a 67year old female. HPI   Patient presents today with a couple concerns. About 2 weeks ago developed cold-like symptoms including cough, chills, and fatigue.   Few days afterwards developed cold sore in the upper Rfl: 0   LOSARTAN 100 MG Oral Tab TAKE 1 TABLET BY MOUTH EVERY DAY Disp: 90 tablet Rfl: 0   triamcinolone acetonide 0.1 % External Ointment Apply 1 Application topically 2 (two) times daily.  Disp: 454 g Rfl: 0   AMLODIPINE BESYLATE 10 MG Oral Tab TAKE 1 TA noted.   Psychiatric: She has a normal mood and affect. ASSESSMENT/PLAN:   1. Chronic bilateral low back pain with left-sided sciatica  Patient here with over 2 months of worsening lower back pain.   On exam she has tenderness to palpation of t

## 2018-08-10 ENCOUNTER — OFFICE VISIT (OUTPATIENT)
Dept: PHYSICAL THERAPY | Age: 73
End: 2018-08-10
Attending: PHYSICIAN ASSISTANT
Payer: MEDICARE

## 2018-08-10 DIAGNOSIS — M54.42 CHRONIC BILATERAL LOW BACK PAIN WITH LEFT-SIDED SCIATICA: ICD-10-CM

## 2018-08-10 DIAGNOSIS — G89.29 CHRONIC BILATERAL LOW BACK PAIN WITH LEFT-SIDED SCIATICA: ICD-10-CM

## 2018-08-10 PROCEDURE — 97161 PT EVAL LOW COMPLEX 20 MIN: CPT | Performed by: PHYSICAL THERAPIST

## 2018-08-10 NOTE — PROGRESS NOTES
LUMBAR SPINE EVALUATION:   Referring Physician: Dr. Millicent Kim  Diagnosis: Chronic bilateral low back pain with left-sided sciatica (J27.83,O98.62)    Evaluation Date: 8/10/2018  Visit # 1  Scheduled Visits 8  Insurance Authorized visits 8 HMO medicare   D Sensation:    Right Left   L3 - Knee  Normal Normal   L4 - Medial malleolus Normal hyper   L5 - Dorsum of foot Normal Normal   L5 - Toes 1-3 Normal Normal   S1 - Toes 4 and 5; lateral malleolus Normal Normal     Gait: antalgic gait with slight late Moving AroundCI: 1%-19% impaired, limited, or restricted    Patient was advised of these findings, precautions, and treatment options and has agreed to actively participate in planning and for this course of care.     Thank you for your referral. Please co-

## 2018-08-11 RX ORDER — AMLODIPINE BESYLATE 10 MG/1
TABLET ORAL
Qty: 90 TABLET | Refills: 0 | Status: SHIPPED | OUTPATIENT
Start: 2018-08-11 | End: 2018-11-21

## 2018-08-12 NOTE — TELEPHONE ENCOUNTER
LOV: 7-17-18 Last rx: 7-17-18    No protocol     Please advise in regards to refill request. Thank You

## 2018-08-12 NOTE — TELEPHONE ENCOUNTER
Hypertensive Medications  Protocol Criteria:  · Appointment scheduled in the past 6 months or in the next 3 months  · BMP or CMP in the past 12 months  · Creatinine result < 2  Recent Outpatient Visits            Yesterday Chronic bilateral low back pain w weeks Bladimir Shanks, MANUEL Black in [address] [facility]  M54.42, G89.29 Chronic bilateral low back pain with left-sided sciatica    In 3 weeks MANUEL Loving in [address] [facility]  M54.

## 2018-08-13 RX ORDER — VALACYCLOVIR HYDROCHLORIDE 1 G/1
TABLET, FILM COATED ORAL
Qty: 4 TABLET | Refills: 5 | Status: SHIPPED | OUTPATIENT
Start: 2018-08-13 | End: 2019-04-18 | Stop reason: ALTCHOICE

## 2018-08-14 ENCOUNTER — OFFICE VISIT (OUTPATIENT)
Dept: PHYSICAL THERAPY | Age: 73
End: 2018-08-14
Attending: PHYSICIAN ASSISTANT
Payer: MEDICARE

## 2018-08-14 PROCEDURE — 97110 THERAPEUTIC EXERCISES: CPT | Performed by: PHYSICAL THERAPIST

## 2018-08-14 NOTE — PROGRESS NOTES
Dx: Chronic bilateral low back pain with left-sided sciatica (M54.42,G89.29)             Visit # 2  Fall Risk: standard     Scheduled Visits 8  Precautions: n/a   Insurance Authorized visits  8 HMO Medicare       Next MD visit: none scheduled  Evaluation D

## 2018-08-17 ENCOUNTER — OFFICE VISIT (OUTPATIENT)
Dept: PHYSICAL THERAPY | Age: 73
End: 2018-08-17
Attending: PHYSICIAN ASSISTANT
Payer: MEDICARE

## 2018-08-17 PROCEDURE — 97530 THERAPEUTIC ACTIVITIES: CPT | Performed by: PHYSICAL THERAPIST

## 2018-08-17 PROCEDURE — 97110 THERAPEUTIC EXERCISES: CPT | Performed by: PHYSICAL THERAPIST

## 2018-08-20 ENCOUNTER — OFFICE VISIT (OUTPATIENT)
Dept: PHYSICAL THERAPY | Age: 73
End: 2018-08-20
Attending: PHYSICIAN ASSISTANT
Payer: MEDICARE

## 2018-08-20 PROCEDURE — 97110 THERAPEUTIC EXERCISES: CPT

## 2018-08-20 NOTE — PROGRESS NOTES
Dx: Chronic bilateral low back pain with left-sided sciatica (M54.42,G89.29)             Visit # 4  Fall Risk: standard     Scheduled Visits 8  Precautions: n/a   Insurance Authorized visits  8 HMO Medicare       Next MD visit: none scheduled  Evaluation D car            Plan: Cont PT per plan of care    Charges: 3 therex,     Total Timed Treatment: 40 min  Total Treatment Time: 40 min

## 2018-08-22 ENCOUNTER — OFFICE VISIT (OUTPATIENT)
Dept: PHYSICAL THERAPY | Age: 73
End: 2018-08-22
Attending: PHYSICIAN ASSISTANT
Payer: MEDICARE

## 2018-08-22 PROCEDURE — 97110 THERAPEUTIC EXERCISES: CPT

## 2018-08-22 NOTE — PROGRESS NOTES
Dx: Chronic bilateral low back pain with left-sided sciatica (M54.42,G89.29)             Visit # 5  Fall Risk: standard     Scheduled Visits 8  Precautions: n/a   Insurance Authorized visits  8 HMO Medicare       Next MD visit: none scheduled  Evaluation D to report ability to sit for 2 hours with pain reported less than 2/10 in order to drive  in car  4.  Pt to exhibit increase in lumbar AROM to min loss in all planes for ease of mobility/transfers in order for patient to get into/out of car            Plan:

## 2018-08-27 RX ORDER — LOSARTAN POTASSIUM 100 MG/1
TABLET ORAL
Qty: 90 TABLET | Refills: 0 | Status: SHIPPED | OUTPATIENT
Start: 2018-08-27 | End: 2018-11-23

## 2018-08-27 NOTE — TELEPHONE ENCOUNTER
Hypertensive Medications  Protocol Criteria:  · Appointment scheduled in the past 6 months or in the next 3 months  · BMP or CMP in the past 12 months  · Creatinine result < 2  Recent Outpatient Visits            5 days ago     [employer] in L 27 (L) 05/22/2018   GFRAA 31 (L) 05/22/2018   CA 9.5 05/22/2018   ALKPHOS 63 10/01/2015   AST 20 05/22/2018   ALT 18 05/22/2018   BILT 0.8 05/22/2018   TP 6.8 05/22/2018   ALB 4.0 05/22/2018    05/22/2018   K 4.7 05/22/2018    05/22/2018   CO2

## 2018-08-28 ENCOUNTER — OFFICE VISIT (OUTPATIENT)
Dept: PHYSICAL THERAPY | Age: 73
End: 2018-08-28
Attending: PHYSICIAN ASSISTANT
Payer: MEDICARE

## 2018-08-28 PROCEDURE — 97110 THERAPEUTIC EXERCISES: CPT | Performed by: PHYSICAL THERAPIST

## 2018-08-28 NOTE — PROGRESS NOTES
PATIENT HAS HMO AND HAS USED 6/8 VISITS.  PLEASE APPROVE Ann  Patient Name: Baldo Wallace, : 1945, MRN: [id number]   Date:  2018  Referring Physician:  Antonio Hill    Diagnosis: Chronic bilateral low back pain with left-sided sc Exercises; Neuromuscular Re-education; Therapeutic Activity; Ultrasound;  Electrical Stim; Traction; Patient education: Home exercise program instruction;  Modalities prn       Patient was advised of these findings, precautions, and treatment options and ha today    Standing L-spine ext x10          Assessment: see progress note  HEP:      Goals     • Therapy Goals            1. Pt to be independent in their home exercise program, its' progression and management of their symptoms-ONGOING  2.   Pt will exhibit

## 2018-08-31 ENCOUNTER — OFFICE VISIT (OUTPATIENT)
Dept: PHYSICAL THERAPY | Age: 73
End: 2018-08-31
Attending: PHYSICIAN ASSISTANT
Payer: MEDICARE

## 2018-08-31 DIAGNOSIS — M54.42 CHRONIC BILATERAL LOW BACK PAIN WITH LEFT-SIDED SCIATICA: Primary | ICD-10-CM

## 2018-08-31 DIAGNOSIS — G89.29 CHRONIC BILATERAL LOW BACK PAIN WITH LEFT-SIDED SCIATICA: Primary | ICD-10-CM

## 2018-08-31 PROCEDURE — 97110 THERAPEUTIC EXERCISES: CPT | Performed by: PHYSICAL THERAPIST

## 2018-08-31 NOTE — PROGRESS NOTES
Dx: Chronic bilateral low back pain with left-sided sciatica (M54.42,G89.29)           Visit # 7  Fall Risk: standard     Scheduled Visits 8  Precautions: n/a   Insurance Authorized visits  8 HMO Medicare       Next MD visit: none scheduled  Evaluation D Plan: Cont PT per plan of care    Charges: 2 therex,     Total Timed Treatment: 30 min  Total Treatment Time: 30 min

## 2018-09-05 ENCOUNTER — OFFICE VISIT (OUTPATIENT)
Dept: PHYSICAL THERAPY | Age: 73
End: 2018-09-05
Attending: PHYSICIAN ASSISTANT
Payer: MEDICARE

## 2018-09-05 PROCEDURE — 97110 THERAPEUTIC EXERCISES: CPT | Performed by: PHYSICAL THERAPIST

## 2018-09-05 NOTE — PROGRESS NOTES
Dx: Chronic bilateral low back pain with left-sided sciatica (M54.42,G89.29)           Visit # 8  Fall Risk: standard     Scheduled Visits 8  Precautions: n/a   Insurance Authorized visits  8 HMO Medicare       Next MD visit: none scheduled  Evaluation D

## 2018-09-06 ENCOUNTER — TELEPHONE (OUTPATIENT)
Dept: PHYSICAL THERAPY | Age: 73
End: 2018-09-06

## 2018-09-07 ENCOUNTER — OFFICE VISIT (OUTPATIENT)
Dept: PHYSICAL THERAPY | Age: 73
End: 2018-09-07
Attending: PHYSICIAN ASSISTANT
Payer: MEDICARE

## 2018-09-07 PROCEDURE — 97110 THERAPEUTIC EXERCISES: CPT | Performed by: PHYSICAL THERAPIST

## 2018-09-07 NOTE — PROGRESS NOTES
Dx: Chronic bilateral low back pain with left-sided sciatica (M54.42,G89.29)           Visit # 9  Fall Risk: standard     Scheduled Visits 8  Precautions: n/a   Insurance Authorized visits  8 HMO Medicare       Next MD visit: none scheduled  Evaluation D

## 2018-09-11 ENCOUNTER — OFFICE VISIT (OUTPATIENT)
Dept: PHYSICAL THERAPY | Age: 73
End: 2018-09-11
Attending: PHYSICIAN ASSISTANT
Payer: MEDICARE

## 2018-09-11 PROCEDURE — 97110 THERAPEUTIC EXERCISES: CPT | Performed by: PHYSICAL THERAPIST

## 2018-09-14 ENCOUNTER — APPOINTMENT (OUTPATIENT)
Dept: PHYSICAL THERAPY | Age: 73
End: 2018-09-14
Attending: PHYSICIAN ASSISTANT
Payer: MEDICARE

## 2018-09-19 ENCOUNTER — APPOINTMENT (OUTPATIENT)
Dept: PHYSICAL THERAPY | Age: 73
End: 2018-09-19
Attending: PHYSICIAN ASSISTANT
Payer: MEDICARE

## 2018-11-23 RX ORDER — HYDROCHLOROTHIAZIDE 25 MG/1
TABLET ORAL
Qty: 90 TABLET | Refills: 0 | Status: SHIPPED | OUTPATIENT
Start: 2018-11-23 | End: 2019-02-27

## 2018-11-23 RX ORDER — AMLODIPINE BESYLATE 10 MG/1
TABLET ORAL
Qty: 90 TABLET | Refills: 0 | Status: SHIPPED | OUTPATIENT
Start: 2018-11-23 | End: 2019-02-23

## 2018-11-24 RX ORDER — LOSARTAN POTASSIUM 100 MG/1
TABLET ORAL
Qty: 90 TABLET | Refills: 0 | Status: SHIPPED | OUTPATIENT
Start: 2018-11-24 | End: 2019-02-27

## 2018-12-03 RX ORDER — AMLODIPINE BESYLATE 10 MG/1
TABLET ORAL
Qty: 90 TABLET | Refills: 0 | OUTPATIENT
Start: 2018-12-03

## 2018-12-03 RX ORDER — LOSARTAN POTASSIUM 100 MG/1
TABLET ORAL
Qty: 90 TABLET | Refills: 0 | OUTPATIENT
Start: 2018-12-03

## 2018-12-03 RX ORDER — HYDROCHLOROTHIAZIDE 25 MG/1
TABLET ORAL
Qty: 90 TABLET | Refills: 0 | OUTPATIENT
Start: 2018-12-03

## 2019-01-14 ENCOUNTER — OFFICE VISIT (OUTPATIENT)
Dept: INTERNAL MEDICINE CLINIC | Facility: CLINIC | Age: 74
End: 2019-01-14
Payer: MEDICARE

## 2019-01-14 VITALS
TEMPERATURE: 98 F | BODY MASS INDEX: 29.1 KG/M2 | RESPIRATION RATE: 18 BRPM | SYSTOLIC BLOOD PRESSURE: 144 MMHG | DIASTOLIC BLOOD PRESSURE: 80 MMHG | HEART RATE: 60 BPM | WEIGHT: 134.88 LBS | HEIGHT: 57 IN

## 2019-01-14 DIAGNOSIS — M19.90 OSTEOARTHRITIS, UNSPECIFIED OSTEOARTHRITIS TYPE, UNSPECIFIED SITE: ICD-10-CM

## 2019-01-14 DIAGNOSIS — I83.813 VARICOSE VEINS OF BOTH LOWER EXTREMITIES WITH PAIN: ICD-10-CM

## 2019-01-14 DIAGNOSIS — H25.13 AGE-RELATED NUCLEAR CATARACT OF BOTH EYES: ICD-10-CM

## 2019-01-14 DIAGNOSIS — E78.5 HYPERLIPIDEMIA, UNSPECIFIED HYPERLIPIDEMIA TYPE: ICD-10-CM

## 2019-01-14 DIAGNOSIS — D69.6 THROMBOCYTOPENIA (HCC): ICD-10-CM

## 2019-01-14 DIAGNOSIS — G89.29 CHRONIC BILATERAL LOW BACK PAIN WITH LEFT-SIDED SCIATICA: ICD-10-CM

## 2019-01-14 DIAGNOSIS — I10 ESSENTIAL HYPERTENSION: Primary | ICD-10-CM

## 2019-01-14 DIAGNOSIS — N18.4 CKD (CHRONIC KIDNEY DISEASE) STAGE 4, GFR 15-29 ML/MIN (HCC): ICD-10-CM

## 2019-01-14 DIAGNOSIS — M54.42 CHRONIC BILATERAL LOW BACK PAIN WITH LEFT-SIDED SCIATICA: ICD-10-CM

## 2019-01-14 PROCEDURE — 99214 OFFICE O/P EST MOD 30 MIN: CPT | Performed by: INTERNAL MEDICINE

## 2019-01-14 PROCEDURE — G0463 HOSPITAL OUTPT CLINIC VISIT: HCPCS | Performed by: INTERNAL MEDICINE

## 2019-01-14 RX ORDER — NEBIVOLOL HYDROCHLORIDE 20 MG/1
TABLET ORAL
Refills: 4 | COMMUNITY
Start: 2018-11-21 | End: 2020-02-17

## 2019-01-14 NOTE — PROGRESS NOTES
HPI:    Patient ID: Ajith Zheng is a 68year old female. HPI  Patient is here for follow-up on chronic medical issues as well as multiple complaints. Complains of diffuse pain in her joints. Vision is not as good as it had been before.   She notes No      Alcohol/week: 0.0 oz    Drug use: No           Review of Systems   Constitutional: Positive for chills. Negative for fatigue and fever. HENT: Positive for hearing loss. Eyes: Positive for visual disturbance. Respiratory: Positive for cough. Oral Tab Take 1-2 tablets by mouth daily as needed. Disp:  Rfl:      Allergies:  Sulfa Antibiotics       UNKNOWN   PHYSICAL EXAM:   Physical Exam    Constitutional: She appears well-developed and well-nourished.    HENT:   Nose: Nose normal.   Mouth/Throat: case.  Use over-the-counter medications.  - SED RATE, SHERLY (AUTOMATED); Future  - C-REACTIVE PROTEIN; Future    6. Varicose veins of both lower extremities with pain  Mildly symptomatic. Monitor for now. Consider compression stockings.     7. Chroni

## 2019-01-15 ENCOUNTER — LAB ENCOUNTER (OUTPATIENT)
Dept: LAB | Facility: HOSPITAL | Age: 74
End: 2019-01-15
Attending: INTERNAL MEDICINE
Payer: MEDICARE

## 2019-01-15 DIAGNOSIS — M19.90 OSTEOARTHRITIS, UNSPECIFIED OSTEOARTHRITIS TYPE, UNSPECIFIED SITE: ICD-10-CM

## 2019-01-15 DIAGNOSIS — I10 ESSENTIAL HYPERTENSION: ICD-10-CM

## 2019-01-15 DIAGNOSIS — E78.5 HYPERLIPIDEMIA, UNSPECIFIED HYPERLIPIDEMIA TYPE: ICD-10-CM

## 2019-01-15 LAB
ALBUMIN SERPL BCP-MCNC: 3.9 G/DL (ref 3.5–4.8)
ALBUMIN/GLOB SERPL: 1.3 {RATIO} (ref 1–2)
ALP SERPL-CCNC: 65 U/L (ref 32–100)
ALT SERPL-CCNC: 16 U/L (ref 14–54)
ANION GAP SERPL CALC-SCNC: 11 MMOL/L (ref 0–18)
AST SERPL-CCNC: 20 U/L (ref 15–41)
BASOPHILS # BLD: 0.1 K/UL (ref 0–0.2)
BASOPHILS NFR BLD: 1 %
BILIRUB SERPL-MCNC: 0.7 MG/DL (ref 0.3–1.2)
BUN SERPL-MCNC: 28 MG/DL (ref 8–20)
BUN/CREAT SERPL: 16.3 (ref 10–20)
CALCIUM SERPL-MCNC: 9.2 MG/DL (ref 8.5–10.5)
CHLORIDE SERPL-SCNC: 105 MMOL/L (ref 95–110)
CHOLEST SERPL-MCNC: 214 MG/DL (ref 110–200)
CO2 SERPL-SCNC: 25 MMOL/L (ref 22–32)
CREAT SERPL-MCNC: 1.72 MG/DL (ref 0.5–1.5)
CRP SERPL-MCNC: <0.5 MG/DL (ref 0–0.9)
EOSINOPHIL # BLD: 0.5 K/UL (ref 0–0.7)
EOSINOPHIL NFR BLD: 7 %
ERYTHROCYTE [DISTWIDTH] IN BLOOD BY AUTOMATED COUNT: 13.8 % (ref 11–15)
ERYTHROCYTE [SEDIMENTATION RATE] IN BLOOD: 18 MM/HR (ref 0–30)
GLOBULIN PLAS-MCNC: 2.9 G/DL (ref 2.5–3.7)
GLUCOSE SERPL-MCNC: 111 MG/DL (ref 70–99)
HCT VFR BLD AUTO: 37.6 % (ref 35–48)
HDLC SERPL-MCNC: 48 MG/DL
HGB BLD-MCNC: 12.9 G/DL (ref 12–16)
LDLC SERPL CALC-MCNC: 141 MG/DL (ref 0–99)
LYMPHOCYTES # BLD: 1.1 K/UL (ref 1–4)
LYMPHOCYTES NFR BLD: 14 %
MCH RBC QN AUTO: 31.1 PG (ref 27–32)
MCHC RBC AUTO-ENTMCNC: 34.3 G/DL (ref 32–37)
MCV RBC AUTO: 90.8 FL (ref 80–100)
MONOCYTES # BLD: 0.6 K/UL (ref 0–1)
MONOCYTES NFR BLD: 8 %
NEUTROPHILS # BLD AUTO: 5.5 K/UL (ref 1.8–7.7)
NEUTROPHILS NFR BLD: 71 %
NONHDLC SERPL-MCNC: 166 MG/DL
OSMOLALITY UR CALC.SUM OF ELEC: 298 MOSM/KG (ref 275–295)
PATIENT FASTING: YES
PLATELET # BLD AUTO: 183 K/UL (ref 140–400)
PMV BLD AUTO: 7.8 FL (ref 7.4–10.3)
POTASSIUM SERPL-SCNC: 4.3 MMOL/L (ref 3.3–5.1)
PROT SERPL-MCNC: 6.8 G/DL (ref 5.9–8.4)
RBC # BLD AUTO: 4.14 M/UL (ref 3.7–5.4)
SODIUM SERPL-SCNC: 141 MMOL/L (ref 136–144)
TRIGL SERPL-MCNC: 123 MG/DL (ref 1–149)
WBC # BLD AUTO: 7.7 K/UL (ref 4–11)

## 2019-01-15 PROCEDURE — 85025 COMPLETE CBC W/AUTO DIFF WBC: CPT

## 2019-01-15 PROCEDURE — 86140 C-REACTIVE PROTEIN: CPT

## 2019-01-15 PROCEDURE — 36415 COLL VENOUS BLD VENIPUNCTURE: CPT

## 2019-01-15 PROCEDURE — 80053 COMPREHEN METABOLIC PANEL: CPT

## 2019-01-15 PROCEDURE — 80061 LIPID PANEL: CPT

## 2019-01-15 PROCEDURE — 85652 RBC SED RATE AUTOMATED: CPT

## 2019-01-17 ENCOUNTER — TELEPHONE (OUTPATIENT)
Dept: CASE MANAGEMENT | Age: 74
End: 2019-01-17

## 2019-01-22 ENCOUNTER — OFFICE VISIT (OUTPATIENT)
Dept: OPHTHALMOLOGY | Facility: CLINIC | Age: 74
End: 2019-01-22
Payer: MEDICARE

## 2019-01-22 DIAGNOSIS — H43.391 FLOATER, VITREOUS, RIGHT: ICD-10-CM

## 2019-01-22 DIAGNOSIS — H25.13 AGE-RELATED NUCLEAR CATARACT OF BOTH EYES: Primary | ICD-10-CM

## 2019-01-22 PROCEDURE — 92014 COMPRE OPH EXAM EST PT 1/>: CPT | Performed by: OPHTHALMOLOGY

## 2019-01-22 PROCEDURE — 92015 DETERMINE REFRACTIVE STATE: CPT | Performed by: OPHTHALMOLOGY

## 2019-01-22 NOTE — PROGRESS NOTES
Devonda Krabbe is a 68year old female. HPI:     HPI     Consult      Additional comments: Consult per Dr. Pavel Albert              Comments     Patient is here for a complete exam.  Patient complains of blurry at distance and near in both eyes.   She also Rfl:    Saline Nasal Spray (SALINE MIST) 0.65 % Nasal Solution 1 spray by Nasal route as needed for congestion. Disp:  Rfl:    Multiple Vitamins-Minerals (MULTI-VITAMIN/MINERALS) Oral Tab Take 1 tablet by mouth daily.  Disp:  Rfl:    Naproxen Sodium (ALEVE Right +1.75 +1.25 180 +3.00    Left +1.75 +0.50 015 +3.00    Type:  Progressive bifocal          Manifest Refraction       Sphere Cylinder [place] [facility] [place]    Right +1.75 +1.25 170 20/30 +3.00 20/20    Left +2.00 +0.75 015 20/30 +3.00 20/20

## 2019-02-23 RX ORDER — AMLODIPINE BESYLATE 10 MG/1
TABLET ORAL
Qty: 90 TABLET | Refills: 0 | Status: SHIPPED | OUTPATIENT
Start: 2019-02-23 | End: 2019-05-24

## 2019-02-28 RX ORDER — HYDROCHLOROTHIAZIDE 25 MG/1
TABLET ORAL
Qty: 90 TABLET | Refills: 0 | Status: SHIPPED | OUTPATIENT
Start: 2019-02-28 | End: 2019-06-06

## 2019-02-28 RX ORDER — LOSARTAN POTASSIUM 100 MG/1
TABLET ORAL
Qty: 90 TABLET | Refills: 0 | Status: SHIPPED | OUTPATIENT
Start: 2019-02-28 | End: 2019-06-06

## 2019-04-14 ENCOUNTER — MA CHART PREP (OUTPATIENT)
Dept: FAMILY MEDICINE CLINIC | Facility: CLINIC | Age: 74
End: 2019-04-14

## 2019-04-14 PROBLEM — R73.01 ELEVATED FASTING GLUCOSE: Status: ACTIVE | Noted: 2019-04-14

## 2019-04-18 ENCOUNTER — OFFICE VISIT (OUTPATIENT)
Dept: INTERNAL MEDICINE CLINIC | Facility: CLINIC | Age: 74
End: 2019-04-18
Payer: MEDICARE

## 2019-04-18 VITALS
DIASTOLIC BLOOD PRESSURE: 66 MMHG | BODY MASS INDEX: 29.69 KG/M2 | WEIGHT: 137.63 LBS | HEIGHT: 57 IN | TEMPERATURE: 98 F | RESPIRATION RATE: 18 BRPM | HEART RATE: 60 BPM | SYSTOLIC BLOOD PRESSURE: 124 MMHG

## 2019-04-18 DIAGNOSIS — M54.12 CERVICAL RADICULOPATHY: ICD-10-CM

## 2019-04-18 DIAGNOSIS — Z12.31 ENCOUNTER FOR SCREENING MAMMOGRAM FOR BREAST CANCER: ICD-10-CM

## 2019-04-18 DIAGNOSIS — H52.4 HYPEROPIA OF BOTH EYES WITH ASTIGMATISM AND PRESBYOPIA: ICD-10-CM

## 2019-04-18 DIAGNOSIS — H52.03 HYPEROPIA OF BOTH EYES WITH ASTIGMATISM AND PRESBYOPIA: ICD-10-CM

## 2019-04-18 DIAGNOSIS — N18.4 CKD (CHRONIC KIDNEY DISEASE) STAGE 4, GFR 15-29 ML/MIN (HCC): ICD-10-CM

## 2019-04-18 DIAGNOSIS — I10 ESSENTIAL HYPERTENSION: ICD-10-CM

## 2019-04-18 DIAGNOSIS — H52.203 HYPEROPIA OF BOTH EYES WITH ASTIGMATISM AND PRESBYOPIA: ICD-10-CM

## 2019-04-18 DIAGNOSIS — Z00.00 ENCOUNTER FOR ANNUAL HEALTH EXAMINATION: Primary | ICD-10-CM

## 2019-04-18 DIAGNOSIS — E78.5 HYPERLIPIDEMIA, UNSPECIFIED HYPERLIPIDEMIA TYPE: ICD-10-CM

## 2019-04-18 DIAGNOSIS — H25.13 AGE-RELATED NUCLEAR CATARACT OF BOTH EYES: ICD-10-CM

## 2019-04-18 DIAGNOSIS — R06.00 DOE (DYSPNEA ON EXERTION): ICD-10-CM

## 2019-04-18 DIAGNOSIS — H43.391 FLOATER, VITREOUS, RIGHT: ICD-10-CM

## 2019-04-18 PROBLEM — D69.6 THROMBOCYTOPENIA (HCC): Chronic | Status: RESOLVED | Noted: 2017-05-30 | Resolved: 2019-04-18

## 2019-04-18 PROBLEM — D69.6 THROMBOCYTOPENIA: Chronic | Status: RESOLVED | Noted: 2017-05-30 | Resolved: 2019-04-18

## 2019-04-18 PROBLEM — R73.01 ELEVATED FASTING GLUCOSE: Status: RESOLVED | Noted: 2019-04-14 | Resolved: 2019-04-18

## 2019-04-18 PROCEDURE — 99397 PER PM REEVAL EST PAT 65+ YR: CPT | Performed by: INTERNAL MEDICINE

## 2019-04-18 PROCEDURE — G0439 PPPS, SUBSEQ VISIT: HCPCS | Performed by: INTERNAL MEDICINE

## 2019-04-18 PROCEDURE — 96160 PT-FOCUSED HLTH RISK ASSMT: CPT | Performed by: INTERNAL MEDICINE

## 2019-04-18 NOTE — PROGRESS NOTES
HPI:   Sasha Man is a 68year old female who presents for a MA (Medicare Advantage) Supervisit (Once per calendar year). Patient is here for Medicare advantage AHA visit as well as follow-up on chronic medical issues as listed below.   Last seen you have any tripping hazards?: 0-No  Are you on multiple medications?: 1-Yes  Does pain affect your day to day activities?: 1-Yes(Back pain)  Have you had any memory issues?: 0-No  Fall/Risk Scorin  Scoring Interpretation: 4+ At Risk      Cognitive As Team:  Rakan Chambers MD as Consulting Physician (NEUROLOGY)    Patient Active Problem List:     Essential hypertension     Age-related nuclear cataract of both eyes     Floater, vitreous, right     Hyperopia with astigmatism and presbyopia     Hyperlip (MULTI-VITAMIN/MINERALS) Oral Tab Take 1 tablet by mouth daily. Naproxen Sodium (ALEVE) 220 MG Oral Tab Take 1-2 tablets by mouth daily as needed.       MEDICAL INFORMATION:   She  has a past medical history of Age-related nuclear cataract of both eyes (1 Screening Method:  Questionnaire  I have a problem hearing over the telephone:  Sometimes I have trouble following the conversations when two or more people are talking at the same time:  Sometimes   I have trouble understanding things on the TV:  No I h not present. Pulmonary/Chest: Effort normal and breath sounds normal. She has no wheezes. She has no rales. Right breast exhibits no inverted nipple, no mass, no nipple discharge, no skin change and no tenderness.  Left breast exhibits no inverted nipple, astigmatism and presbyopia  Stable. Follow-up with ophthalmology. 9. PRINCE (dyspnea on exertion)  B dyspnea on exertion and chest discomfort in patient with multiple risk factors.   Do not see reason for emergent evaluation or hospitalization at this time every 10 years Colonoscopy due on 09/01/2019 Update [facility] if applicable    Flex Sigmoidoscopy Screen every 10 years No results found for this or any previous visit. No flowsheet data found.      Fecal Occult Blood Annually No results found for: Zoster   Not covered by Medicare Part B No vaccine history found This may be covered with your pharmacy  prescription benefits      [facility] Internal Lab or Procedure External Lab or Procedure      Annual Monitoring of Persistent     Med

## 2019-04-18 NOTE — PATIENT INSTRUCTIONS
Zheng Ortiz's SCREENING SCHEDULE   Tests on this list are recommended by your physician but may not be covered, or covered at this frequency, by your insurer. Please check with your insurance carrier before scheduling to verify coverage.    PREVENTATI history    Colorectal Cancer Screening  Covered up to Age 76     Colonoscopy Screen   Covered every 10 years- more often if abnormal Colonoscopy due on 09/01/2019 Update Health Maintenance if applicable    Flex Sigmoidoscopy Screen  Covered every 5 years N visit. Please get once after your 65th birthday    Pneumococcal 23 (Pneumovax)  Covered Once after 65 No orders found for this or any previous visit.  Please get once after your 65th birthday    Hepatitis B for Moderate/High Risk       No orders found for t

## 2019-04-29 ENCOUNTER — HOSPITAL ENCOUNTER (OUTPATIENT)
Dept: CV DIAGNOSTICS | Facility: HOSPITAL | Age: 74
Discharge: HOME | End: 2019-04-29
Attending: INTERNAL MEDICINE
Payer: MEDICARE

## 2019-04-29 ENCOUNTER — HOSPITAL ENCOUNTER (OUTPATIENT)
Dept: NUCLEAR MEDICINE | Facility: HOSPITAL | Age: 74
Discharge: HOME | End: 2019-04-29
Attending: INTERNAL MEDICINE
Payer: MEDICARE

## 2019-04-29 DIAGNOSIS — I10 ESSENTIAL HYPERTENSION: ICD-10-CM

## 2019-04-29 DIAGNOSIS — R06.00 DOE (DYSPNEA ON EXERTION): ICD-10-CM

## 2019-04-29 DIAGNOSIS — E78.5 HYPERLIPIDEMIA, UNSPECIFIED HYPERLIPIDEMIA TYPE: ICD-10-CM

## 2019-04-29 PROCEDURE — 93018 CV STRESS TEST I&R ONLY: CPT | Performed by: INTERNAL MEDICINE

## 2019-04-29 PROCEDURE — 93017 CV STRESS TEST TRACING ONLY: CPT | Performed by: INTERNAL MEDICINE

## 2019-04-29 PROCEDURE — 93016 CV STRESS TEST SUPVJ ONLY: CPT | Performed by: INTERNAL MEDICINE

## 2019-04-29 PROCEDURE — 78452 HT MUSCLE IMAGE SPECT MULT: CPT | Performed by: INTERNAL MEDICINE

## 2019-04-29 RX ORDER — 0.9 % SODIUM CHLORIDE 0.9 %
VIAL (ML) INJECTION
Status: COMPLETED
Start: 2019-04-29 | End: 2019-04-29

## 2019-04-29 RX ADMIN — 0.9 % SODIUM CHLORIDE 10 ML: 0.9 % VIAL (ML) INJECTION at 12:28:00

## 2019-05-24 RX ORDER — AMLODIPINE BESYLATE 10 MG/1
TABLET ORAL
Qty: 90 TABLET | Refills: 1 | Status: SHIPPED | OUTPATIENT
Start: 2019-05-24 | End: 2019-11-08

## 2019-05-24 NOTE — TELEPHONE ENCOUNTER
Refill passed per [facility] protocol.   Hypertensive Medications  Protocol Criteria:  · Appointment scheduled in the past 6 months or in the next 3 months  · BMP or CMP in the past 12 months  · Creatinine result < 2  Recent Outpatient Visits

## 2019-05-28 ENCOUNTER — HOSPITAL ENCOUNTER (OUTPATIENT)
Dept: MAMMOGRAPHY | Facility: HOSPITAL | Age: 74
Discharge: HOME | End: 2019-05-28
Attending: INTERNAL MEDICINE
Payer: MEDICARE

## 2019-05-28 DIAGNOSIS — Z12.31 ENCOUNTER FOR SCREENING MAMMOGRAM FOR BREAST CANCER: ICD-10-CM

## 2019-05-28 PROCEDURE — 77067 SCR MAMMO BI INCL CAD: CPT | Performed by: INTERNAL MEDICINE

## 2019-05-28 PROCEDURE — 77063 BREAST TOMOSYNTHESIS BI: CPT | Performed by: INTERNAL MEDICINE

## 2019-06-06 RX ORDER — HYDROCHLOROTHIAZIDE 25 MG/1
TABLET ORAL
Qty: 90 TABLET | Refills: 1 | Status: SHIPPED | OUTPATIENT
Start: 2019-06-06 | End: 2019-12-02

## 2019-06-06 RX ORDER — LOSARTAN POTASSIUM 100 MG/1
TABLET ORAL
Qty: 90 TABLET | Refills: 1 | Status: SHIPPED | OUTPATIENT
Start: 2019-06-06 | End: 2019-12-02

## 2019-08-01 ENCOUNTER — TELEPHONE (OUTPATIENT)
Dept: GASTROENTEROLOGY | Facility: CLINIC | Age: 74
End: 2019-08-01

## 2019-08-01 NOTE — TELEPHONE ENCOUNTER
----- Message from Nayana Garay RN sent at 9/14/2016 10:35 AM CDT -----  Regarding: recall colon  Recall colon in 3 years per CB.  Colon done 9/1/16

## 2019-09-09 ENCOUNTER — TELEPHONE (OUTPATIENT)
Dept: INTERNAL MEDICINE CLINIC | Facility: CLINIC | Age: 74
End: 2019-09-09

## 2019-09-09 NOTE — TELEPHONE ENCOUNTER
Dr Jean-Paul Yin, please advise. Patient drives grandchild to father for visitation, and because of 's request, patient stated that she already has the card for parking her card.  She said she needs a note from the doctor with his signature that says she

## 2019-09-10 NOTE — TELEPHONE ENCOUNTER
Please contact the patient and clarify. From what is the patient \"medically disabled\"? What is she referring to that she can no longer do?

## 2019-09-14 NOTE — TELEPHONE ENCOUNTER
Pt called and informed her of Dr Jewel Peters message below. Pt states she can only drive on short distance and cannot walk more than 100 feet due to back and leg problems.

## 2019-09-17 NOTE — TELEPHONE ENCOUNTER
Patient contacted and informed.  Letter placed at [facility]

## 2019-09-23 ENCOUNTER — TELEPHONE (OUTPATIENT)
Dept: INTERNAL MEDICINE CLINIC | Facility: CLINIC | Age: 74
End: 2019-09-23

## 2019-09-23 ENCOUNTER — OFFICE VISIT (OUTPATIENT)
Dept: INTERNAL MEDICINE CLINIC | Facility: CLINIC | Age: 74
End: 2019-09-23
Payer: MEDICARE

## 2019-09-23 VITALS
HEART RATE: 59 BPM | SYSTOLIC BLOOD PRESSURE: 136 MMHG | HEIGHT: 57 IN | WEIGHT: 137 LBS | BODY MASS INDEX: 29.56 KG/M2 | TEMPERATURE: 98 F | DIASTOLIC BLOOD PRESSURE: 69 MMHG

## 2019-09-23 DIAGNOSIS — R21 RASH: ICD-10-CM

## 2019-09-23 DIAGNOSIS — R05.9 COUGH: ICD-10-CM

## 2019-09-23 DIAGNOSIS — Z28.21 IMMUNIZATION NOT CARRIED OUT BECAUSE OF PATIENT REFUSAL: Primary | ICD-10-CM

## 2019-09-23 PROCEDURE — 99214 OFFICE O/P EST MOD 30 MIN: CPT | Performed by: NURSE PRACTITIONER

## 2019-09-23 RX ORDER — CODEINE PHOSPHATE AND GUAIFENESIN 10; 100 MG/5ML; MG/5ML
5 SOLUTION ORAL NIGHTLY
Qty: 50 ML | Refills: 0 | Status: SHIPPED | OUTPATIENT
Start: 2019-09-23 | End: 2019-10-03

## 2019-09-23 RX ORDER — ALBUTEROL SULFATE 90 UG/1
AEROSOL, METERED RESPIRATORY (INHALATION)
Qty: 1 INHALER | Refills: 1 | Status: SHIPPED | OUTPATIENT
Start: 2019-09-23

## 2019-09-23 RX ORDER — CLOTRIMAZOLE AND BETAMETHASONE DIPROPIONATE 10; .64 MG/G; MG/G
1 CREAM TOPICAL 2 TIMES DAILY PRN
Qty: 15 G | Refills: 3 | Status: SHIPPED | OUTPATIENT
Start: 2019-09-23 | End: 2020-06-20

## 2019-09-23 RX ORDER — ALBUTEROL SULFATE 90 UG/1
2 AEROSOL, METERED RESPIRATORY (INHALATION) EVERY 6 HOURS PRN
Qty: 1 INHALER | Refills: 1 | Status: SHIPPED | OUTPATIENT
Start: 2019-09-23 | End: 2019-11-08

## 2019-09-23 NOTE — PROGRESS NOTES
HPI:    Patient ID: Brinda Maxwell is a 68year old female. HPI-65 year old female with dry cough. She went to her friends house approximately 10 days ago. She was remolding and the house was very ema. She developed a cough the next day.  Since  has b every 6 (six) hours as needed for Wheezing (cough).  Disp: 1 Inhaler Rfl: 1   LOSARTAN 100 MG Oral Tab TAKE 1 TABLET BY MOUTH EVERY DAY Disp: 90 tablet Rfl: 1   HYDROCHLOROTHIAZIDE 25 MG Oral Tab TAKE 1 TABLET(25 MG) BY MOUTH DAILY Disp: 90 tablet Rfl: 1 She has no cervical adenopathy. Neurological: She is alert and oriented to person, place, and time. No cranial nerve deficit, sensory deficit or motor deficit. Skin: Skin is warm and dry. No rash noted. Psychiatric: She has a normal mood and affect. No follow-ups on file.     Orders Placed This Encounter      Influenza Vaccine Refused (Order that documents the process)      Meds This Visit:  Requested Prescriptions     Signed Prescriptions Disp Refills   • triamcinolone acetonide 0.1 % Ext

## 2019-09-23 NOTE — ASSESSMENT & PLAN NOTE
A/P 27-year-old female here with complaints of rash on her hands. She says she has this rash intermittently for many years. In the past she is taking clotrimazole-betamethasone and this has helped. She would like to try this again.     1) Clotrimazole-bet

## 2019-09-23 NOTE — ASSESSMENT & PLAN NOTE
A/P-68year-old female here with complaints of a dry cough. Approximately 10 days ago she visited her friend's house who was remodeling and it was very ema. After she left there she started coughing.   She has been coughing intermittently with a dry cou

## 2019-09-23 NOTE — TELEPHONE ENCOUNTER
DRUG NOT COVEREDFORMULARY        Medication: Albuterol Sulfate  (90 Base) MCG/ACT Inhalation Aero Soln       Preferred is Ventolin, please review pended medication order, thank you.

## 2019-11-08 RX ORDER — AMLODIPINE BESYLATE 10 MG/1
TABLET ORAL
Qty: 90 TABLET | Refills: 1 | Status: SHIPPED | OUTPATIENT
Start: 2019-11-08 | End: 2020-02-17

## 2019-11-09 RX ORDER — ALBUTEROL SULFATE 90 UG/1
AEROSOL, METERED RESPIRATORY (INHALATION)
Qty: 18 G | Refills: 1 | Status: SHIPPED | OUTPATIENT
Start: 2019-11-09 | End: 2022-04-21

## 2019-11-09 NOTE — TELEPHONE ENCOUNTER
Refill passed per [facility] protocol.   Hypertensive Medications  Protocol Criteria:  · Appointment scheduled in the past 6 months or in the next 3 months  · BMP or CMP in the past 12 months  · Creatinine result < 2  Recent Outpatient Visits

## 2019-12-03 RX ORDER — HYDROCHLOROTHIAZIDE 25 MG/1
TABLET ORAL
Qty: 90 TABLET | Refills: 1 | Status: SHIPPED | OUTPATIENT
Start: 2019-12-03 | End: 2020-04-09

## 2019-12-03 RX ORDER — LOSARTAN POTASSIUM 100 MG/1
TABLET ORAL
Qty: 90 TABLET | Refills: 1 | Status: SHIPPED | OUTPATIENT
Start: 2019-12-03 | End: 2020-04-09

## 2019-12-03 NOTE — TELEPHONE ENCOUNTER
Please review; protocol failed.     Requested Prescriptions     Pending Prescriptions Disp Refills   • HYDROCHLOROTHIAZIDE 25 MG Oral Tab [Pharmacy Med Name: HYDROCHLOROTHIAZIDE 25MG TABLETS] 90 tablet 0     Sig: TAKE 1 TABLET(25 MG) BY MOUTH DAILY   • ÁLVARO

## 2019-12-23 ENCOUNTER — OFFICE VISIT (OUTPATIENT)
Dept: INTERNAL MEDICINE CLINIC | Facility: CLINIC | Age: 74
End: 2019-12-23
Payer: MEDICARE

## 2019-12-23 VITALS
DIASTOLIC BLOOD PRESSURE: 76 MMHG | RESPIRATION RATE: 18 BRPM | WEIGHT: 138 LBS | SYSTOLIC BLOOD PRESSURE: 150 MMHG | BODY MASS INDEX: 29.77 KG/M2 | HEIGHT: 57 IN | TEMPERATURE: 98 F | HEART RATE: 60 BPM

## 2019-12-23 DIAGNOSIS — N81.10 VAGINAL PROLAPSE: ICD-10-CM

## 2019-12-23 DIAGNOSIS — E78.5 HYPERLIPIDEMIA, UNSPECIFIED HYPERLIPIDEMIA TYPE: ICD-10-CM

## 2019-12-23 DIAGNOSIS — I73.9 PERIPHERAL VASCULAR DISEASE (HCC): ICD-10-CM

## 2019-12-23 DIAGNOSIS — N18.4 CKD (CHRONIC KIDNEY DISEASE) STAGE 4, GFR 15-29 ML/MIN (HCC): ICD-10-CM

## 2019-12-23 DIAGNOSIS — I10 ESSENTIAL HYPERTENSION: Primary | ICD-10-CM

## 2019-12-23 PROCEDURE — 99214 OFFICE O/P EST MOD 30 MIN: CPT | Performed by: INTERNAL MEDICINE

## 2019-12-23 RX ORDER — MOMETASONE 50 UG/1
SPRAY, METERED NASAL
COMMUNITY

## 2019-12-23 RX ORDER — OLMESARTAN MEDOXOMIL 40 MG/1
TABLET ORAL
COMMUNITY
End: 2019-12-23 | Stop reason: ALTCHOICE

## 2019-12-23 RX ORDER — BUDESONIDE AND FORMOTEROL FUMARATE DIHYDRATE 160; 4.5 UG/1; UG/1
AEROSOL RESPIRATORY (INHALATION)
COMMUNITY
Start: 2015-02-12 | End: 2019-12-23 | Stop reason: ALTCHOICE

## 2019-12-23 NOTE — PROGRESS NOTES
HPI:    Patient ID: Liliana Diaz is a 68year old female. HPI  Patient is here for follow-up on chronic medical issues and with numerous things to discuss. She was seen here back in April for [facility] visit. Had some dyspnea on exertion.   Normal nuclear Tobacco Use      Smoking status: Former Smoker        Packs/day: 1.00        Years: 9.00        Pack years: [phone]        Quit date: 1983        Years since quittin.0      Smokeless tobacco: Never Used    Alcohol use: No      Alcohol/week: 0.0 standard 1 Inhaler 1   • BYSTOLIC 20 MG Oral Tab TK 1 T PO QD  4   • Vitamin B-12 1000 MCG Oral Tab Take 1 tablet (1,000 mcg total) by mouth daily. 30 tablet 3   • aspirin EC 81 MG Oral Tab EC Take 1 tablet (81 mg total) by mouth daily.  30 tablet 3   • Cetirizine H ASSESSMENT/PLAN:   1. Essential hypertension  Blood pressure with borderline control. Continue current medication. Check blood pressure at home. Follow-up in 3 months for blood pressure check. Consider additional medications if elevated at that time.

## 2019-12-30 ENCOUNTER — LAB ENCOUNTER (OUTPATIENT)
Dept: LAB | Facility: HOSPITAL | Age: 74
End: 2019-12-30
Attending: INTERNAL MEDICINE
Payer: MEDICARE

## 2019-12-30 DIAGNOSIS — I10 ESSENTIAL HYPERTENSION: ICD-10-CM

## 2019-12-30 LAB
ALBUMIN SERPL-MCNC: 3.9 G/DL (ref 3.4–5)
ALBUMIN/GLOB SERPL: 1.2 {RATIO} (ref 1–2)
ALP LIVER SERPL-CCNC: 66 U/L (ref 55–142)
ALT SERPL-CCNC: 19 U/L (ref 13–56)
ANION GAP SERPL CALC-SCNC: 5 MMOL/L (ref 0–18)
AST SERPL-CCNC: 15 U/L (ref 15–37)
BASOPHILS # BLD AUTO: 0.06 X10(3) UL (ref 0–0.2)
BASOPHILS NFR BLD AUTO: 1 %
BILIRUB SERPL-MCNC: 0.6 MG/DL (ref 0.1–2)
BUN BLD-MCNC: 33 MG/DL (ref 7–18)
BUN/CREAT SERPL: 17.3 (ref 10–20)
CALCIUM BLD-MCNC: 9.3 MG/DL (ref 8.5–10.1)
CHLORIDE SERPL-SCNC: 108 MMOL/L (ref 98–112)
CHOLEST SMN-MCNC: 200 MG/DL (ref ?–200)
CO2 SERPL-SCNC: 28 MMOL/L (ref 21–32)
CREAT BLD-MCNC: 1.91 MG/DL (ref 0.55–1.02)
DEPRECATED RDW RBC AUTO: 44.2 FL (ref 35.1–46.3)
EOSINOPHIL # BLD AUTO: 0.42 X10(3) UL (ref 0–0.7)
EOSINOPHIL NFR BLD AUTO: 6.7 %
ERYTHROCYTE [DISTWIDTH] IN BLOOD BY AUTOMATED COUNT: 13.2 % (ref 11–15)
GLOBULIN PLAS-MCNC: 3.3 G/DL (ref 2.8–4.4)
GLUCOSE BLD-MCNC: 103 MG/DL (ref 70–99)
HAV IGM SER QL: 2.2 MG/DL (ref 1.6–2.6)
HCT VFR BLD AUTO: 37.9 % (ref 35–48)
HDLC SERPL-MCNC: 49 MG/DL (ref 40–59)
HGB BLD-MCNC: 12.8 G/DL (ref 12–16)
IMM GRANULOCYTES # BLD AUTO: 0.02 X10(3) UL (ref 0–1)
IMM GRANULOCYTES NFR BLD: 0.3 %
LDLC SERPL CALC-MCNC: 121 MG/DL (ref ?–100)
LYMPHOCYTES # BLD AUTO: 1.1 X10(3) UL (ref 1–4)
LYMPHOCYTES NFR BLD AUTO: 17.5 %
M PROTEIN MFR SERPL ELPH: 7.2 G/DL (ref 6.4–8.2)
MCH RBC QN AUTO: 30.9 PG (ref 26–34)
MCHC RBC AUTO-ENTMCNC: 33.8 G/DL (ref 31–37)
MCV RBC AUTO: 91.5 FL (ref 80–100)
MONOCYTES # BLD AUTO: 0.61 X10(3) UL (ref 0.1–1)
MONOCYTES NFR BLD AUTO: 9.7 %
NEUTROPHILS # BLD AUTO: 4.08 X10 (3) UL (ref 1.5–7.7)
NEUTROPHILS # BLD AUTO: 4.08 X10(3) UL (ref 1.5–7.7)
NEUTROPHILS NFR BLD AUTO: 64.8 %
NONHDLC SERPL-MCNC: 151 MG/DL (ref ?–130)
OSMOLALITY SERPL CALC.SUM OF ELEC: 300 MOSM/KG (ref 275–295)
PATIENT FASTING Y/N/NP: YES
PATIENT FASTING Y/N/NP: YES
PLATELET # BLD AUTO: 158 10(3)UL (ref 150–450)
POTASSIUM SERPL-SCNC: 4.3 MMOL/L (ref 3.5–5.1)
RBC # BLD AUTO: 4.14 X10(6)UL (ref 3.8–5.3)
SODIUM SERPL-SCNC: 141 MMOL/L (ref 136–145)
TRIGL SERPL-MCNC: 148 MG/DL (ref 30–149)
TSI SER-ACNC: 2.09 MIU/ML (ref 0.36–3.74)
VIT B12 SERPL-MCNC: 533 PG/ML (ref 193–986)
VLDLC SERPL CALC-MCNC: 30 MG/DL (ref 0–30)
WBC # BLD AUTO: 6.3 X10(3) UL (ref 4–11)

## 2019-12-30 PROCEDURE — 80061 LIPID PANEL: CPT

## 2019-12-30 PROCEDURE — 80053 COMPREHEN METABOLIC PANEL: CPT

## 2019-12-30 PROCEDURE — 36415 COLL VENOUS BLD VENIPUNCTURE: CPT

## 2019-12-30 PROCEDURE — 85025 COMPLETE CBC W/AUTO DIFF WBC: CPT

## 2019-12-30 PROCEDURE — 83735 ASSAY OF MAGNESIUM: CPT

## 2019-12-30 PROCEDURE — 84443 ASSAY THYROID STIM HORMONE: CPT

## 2019-12-30 PROCEDURE — 82607 VITAMIN B-12: CPT

## 2020-01-01 NOTE — PROGRESS NOTES
Please send letter  The blood cell count is normal. There is no evidence of anemia or infection. The blood sugar (glucose) level is 103. We would like this below 100. It is improved from 11 months ago.     The electrolytes levels in the blood are normal.

## 2020-01-07 ENCOUNTER — HOSPITAL ENCOUNTER (OUTPATIENT)
Dept: ULTRASOUND IMAGING | Facility: HOSPITAL | Age: 75
Discharge: HOME | End: 2020-01-07
Attending: INTERNAL MEDICINE
Payer: MEDICARE

## 2020-01-07 DIAGNOSIS — I73.9 PERIPHERAL VASCULAR DISEASE (HCC): ICD-10-CM

## 2020-01-07 PROCEDURE — 93923 UPR/LXTR ART STDY 3+ LVLS: CPT | Performed by: INTERNAL MEDICINE

## 2020-01-08 ENCOUNTER — HOSPITAL ENCOUNTER (OUTPATIENT)
Dept: GENERAL RADIOLOGY | Facility: HOSPITAL | Age: 75
Discharge: HOME | End: 2020-01-08
Attending: NURSE PRACTITIONER
Payer: MEDICARE

## 2020-01-08 ENCOUNTER — OFFICE VISIT (OUTPATIENT)
Dept: INTERNAL MEDICINE CLINIC | Facility: CLINIC | Age: 75
End: 2020-01-08
Payer: MEDICARE

## 2020-01-08 VITALS
SYSTOLIC BLOOD PRESSURE: 142 MMHG | WEIGHT: 137.13 LBS | DIASTOLIC BLOOD PRESSURE: 78 MMHG | BODY MASS INDEX: 29.58 KG/M2 | HEART RATE: 56 BPM | TEMPERATURE: 98 F | HEIGHT: 57 IN
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
## 2020-01-08 DIAGNOSIS — R21 RASH: ICD-10-CM
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
## 2020-01-08 DIAGNOSIS — I10 ESSENTIAL HYPERTENSION: ICD-10-CM

## 2020-01-08 PROCEDURE — 73552 X-RAY EXAM OF FEMUR 2/>: CPT | Performed by: NURSE PRACTITIONER

## 2020-01-08 PROCEDURE — 99213 OFFICE O/P EST LOW 20 MIN: CPT | Performed by: NURSE PRACTITIONER

## 2020-01-08 PROCEDURE — 73502 X-RAY EXAM HIP UNI 2-3 VIEWS: CPT | Performed by: NURSE PRACTITIONER

## 2020-01-08 RX ORDER — HYDROXYZINE HYDROCHLORIDE 25 MG/1
25 TABLET, FILM COATED ORAL 3 TIMES DAILY PRN
Qty: 30 TABLET | Refills: 0 | Status: SHIPPED | OUTPATIENT
Start: 2020-01-08 | End: 2020-07-06

## 2020-01-08 NOTE — ASSESSMENT & PLAN NOTE
A/P Patient with a history of hypertension. Patient states she gets anxious when she goes to the doctor's office. He is on amlodipine 10 mg daily, and Bystolic 20 mg daily. Patient is trying to follow a low-sodium diet.   BLood pressure was elevated with

## 2020-01-08 NOTE — PROGRESS NOTES
HPI:    Patient ID: Natalie You is a 76year old female. HPI Patient here for Rash. Patient has hx of   Rashes. This rash started one week ago. It is itchy. On a scale of 1/10 it itches 9/10.   The rash is on her lower abdomen.hands, and • hydrOXYzine HCl 25 MG Oral Tab Take 1 tablet (25 mg total) by mouth 3 (three) times daily as needed for Itching.  30 tablet 0   • Mometasone Furoate (NASONEX) 50 MCG/ACT Nasal Suspension 2 spray(s)     • HYDROCHLOROTHIAZIDE 25 MG Oral Tab TAKE 1 TABLET(25 Cardiovascular: Normal rate, regular rhythm, normal heart sounds and intact distal pulses. Exam reveals no gallop and no friction rub. No murmur heard. Pulmonary/Chest: Effort normal and breath sounds normal. She has no wheezes. She has no rales.    Ab A/P-76year-old female with history of rashes in the past.  She has a history of allergic reactions. Takes Zyrtec daily. She lives with 2 dogs and a cat for a long time. She does not think they are the cause of her eczema and rashes.   1 week ago she s

## 2020-01-08 NOTE — ASSESSMENT & PLAN NOTE
A/P- 76year old female who had a fall on Sunday. She tripped on a curb and fell on her left side. Patient states she has some pain in her hip and left upper femur area. Pain is intermittent and is 3 out of 10. She has not taken anything for pain.   The

## 2020-01-08 NOTE — ASSESSMENT & PLAN NOTE
A/P-76year-old female with history of rashes in the past.  She has a history of allergic reactions. Takes Zyrtec daily. She lives with 2 dogs and a cat for a long time. She does not think they are the cause of her eczema and rashes.   1 week ago she sta

## 2020-01-13 ENCOUNTER — OFFICE VISIT (OUTPATIENT)
Dept: OBGYN CLINIC | Facility: CLINIC | Age: 75
End: 2020-01-13
Payer: MEDICARE

## 2020-01-13 VITALS — SYSTOLIC BLOOD PRESSURE: 138 MMHG | DIASTOLIC BLOOD PRESSURE: 60 MMHG | BODY MASS INDEX: 30 KG/M2 | WEIGHT: 140.38 LBS

## 2020-01-13 DIAGNOSIS — N81.4 UTERINE PROLAPSE: Primary | ICD-10-CM

## 2020-01-13 PROCEDURE — A4562 PESSARY, NON RUBBER,ANY TYPE: HCPCS | Performed by: OBSTETRICS & GYNECOLOGY

## 2020-01-13 PROCEDURE — 57160 INSERT PESSARY/OTHER DEVICE: CPT | Performed by: OBSTETRICS & GYNECOLOGY

## 2020-01-13 NOTE — PROGRESS NOTES
Jr Ortiz    There was no acute findings on your left hip. You have mild arthritis.     Andreas Nixoner, ANP  Working with Dr. Alondra De Luna

## 2020-01-13 NOTE — PROGRESS NOTES
Steve Ortiz    Left hip does not show any fracture or dislocation.     QUINCY Blakely  Working with Dr. Sunny Marshall

## 2020-01-13 NOTE — PROCEDURES
Pessary      Consent signed. Procedure discussed with patient in detail including indication, risk, benefits, alternatives and complications.     Problems:  Discomfort    Procedure:  Pessary Type: Ring with support  Size: 3    Rectocele Grade 0  Cystocele

## 2020-01-21 ENCOUNTER — TELEPHONE (OUTPATIENT)
Dept: CASE MANAGEMENT | Age: 75
End: 2020-01-21

## 2020-01-21 NOTE — TELEPHONE ENCOUNTER
Patient is eligible for a 2020 Medicare Advantage Supervisit in April. Left message to call back [phone].

## 2020-01-27 ENCOUNTER — OFFICE VISIT (OUTPATIENT)
Dept: OBGYN CLINIC | Facility: CLINIC | Age: 75
End: 2020-01-27
Payer: MEDICARE

## 2020-01-27 VITALS — DIASTOLIC BLOOD PRESSURE: 72 MMHG | SYSTOLIC BLOOD PRESSURE: 120 MMHG | WEIGHT: 139.38 LBS | BODY MASS INDEX: 30 KG/M2

## 2020-01-27 DIAGNOSIS — N81.4 UTERINE PROLAPSE: Primary | ICD-10-CM

## 2020-01-27 PROCEDURE — 57160 INSERT PESSARY/OTHER DEVICE: CPT | Performed by: OBSTETRICS & GYNECOLOGY

## 2020-01-27 NOTE — PROCEDURES
Pessary         Consent signed. Procedure discussed with patient in detail including indication, risk, benefits, alternatives and complications.     Problems:  None    Procedure:  Pessary Type: Ring with support  Size: 3    Rectocele Grade 0  Cystocele Gra

## 2020-02-17 RX ORDER — AMLODIPINE BESYLATE 10 MG/1
10 TABLET ORAL
Qty: 90 TABLET | Refills: 1 | Status: SHIPPED | OUTPATIENT
Start: 2020-02-17 | End: 2020-11-09

## 2020-02-17 RX ORDER — NEBIVOLOL HYDROCHLORIDE 20 MG/1
20 TABLET ORAL DAILY
Qty: 90 TABLET | Refills: 1 | Status: SHIPPED | OUTPATIENT
Start: 2020-02-17 | End: 2020-07-06

## 2020-02-17 NOTE — TELEPHONE ENCOUNTER
Dr Feli Light, patient requesting refill AMLODIPINE and BYSTOLIC  BYSTOLIC not previously prescribed at [address] [place] [address]

## 2020-02-28 ENCOUNTER — TELEPHONE (OUTPATIENT)
Dept: INTERNAL MEDICINE CLINIC | Facility: CLINIC | Age: 75
End: 2020-02-28

## 2020-02-28 DIAGNOSIS — H26.9 CATARACT, UNSPECIFIED CATARACT TYPE, UNSPECIFIED LATERALITY: Primary | ICD-10-CM

## 2020-02-28 NOTE — TELEPHONE ENCOUNTER
[facility] from [address] WAGNER HAN [facility]) requesting a referral for patient needs cataract appt for the below    1. (feb 25) Dr. Pike Heading  [address] [facility],[address] [address]    2. [facility] Dr. Joelle Barakat [phone]    Informed [facility] we only get approval from [facility] for office visits only. She verbally understood. She would have to call back with NPI inform for [id number] [facility]. She will call back with that info.     Thanks    [address] [facility]

## 2020-04-09 RX ORDER — LOSARTAN POTASSIUM 100 MG/1
TABLET ORAL
Qty: 90 TABLET | Refills: 1 | Status: SHIPPED | OUTPATIENT
Start: 2020-04-09 | End: 2020-06-20

## 2020-04-09 RX ORDER — HYDROCHLOROTHIAZIDE 25 MG/1
TABLET ORAL
Qty: 90 TABLET | Refills: 1 | Status: SHIPPED | OUTPATIENT
Start: 2020-04-09 | End: 2020-06-20

## 2020-05-28 ENCOUNTER — OFFICE VISIT (OUTPATIENT)
Dept: OBGYN CLINIC | Facility: CLINIC | Age: 75
End: 2020-05-28
Payer: MEDICARE

## 2020-05-28 VITALS — SYSTOLIC BLOOD PRESSURE: 130 MMHG | BODY MASS INDEX: 31 KG/M2 | DIASTOLIC BLOOD PRESSURE: 70 MMHG | WEIGHT: 142.63 LBS

## 2020-05-28 DIAGNOSIS — N81.4 UTERINE PROLAPSE: Primary | ICD-10-CM

## 2020-05-28 PROCEDURE — 57160 INSERT PESSARY/OTHER DEVICE: CPT | Performed by: OBSTETRICS & GYNECOLOGY

## 2020-05-28 NOTE — PROCEDURES
Pessary      Consent signed. Procedure discussed with patient in detail including indication, risk, benefits, alternatives and complications.     Problems:  None    Procedure:  Pessary Type: Ring with support  Size: 3    Rectocele Grade 0  Cystocele Grade

## 2020-06-11 PROBLEM — I73.9 PAD (PERIPHERAL ARTERY DISEASE) (HCC): Status: ACTIVE | Noted: 2020-06-11

## 2020-06-11 PROBLEM — I73.9 PAD (PERIPHERAL ARTERY DISEASE): Status: ACTIVE | Noted: 2020-06-11

## 2020-06-11 PROBLEM — M47.812 FACET ARTHRITIS OF CERVICAL REGION: Status: ACTIVE | Noted: 2020-06-11

## 2020-06-20 ENCOUNTER — LAB ENCOUNTER (OUTPATIENT)
Dept: LAB | Facility: HOSPITAL | Age: 75
End: 2020-06-20
Attending: INTERNAL MEDICINE
Payer: MEDICARE

## 2020-06-20 ENCOUNTER — OFFICE VISIT (OUTPATIENT)
Dept: INTERNAL MEDICINE CLINIC | Facility: CLINIC | Age: 75
End: 2020-06-20
Payer: MEDICARE

## 2020-06-20 VITALS
BODY MASS INDEX: 31.72 KG/M2 | DIASTOLIC BLOOD PRESSURE: 77 MMHG | HEART RATE: 52 BPM | HEIGHT: 56 IN | SYSTOLIC BLOOD PRESSURE: 135 MMHG | WEIGHT: 141 LBS

## 2020-06-20 DIAGNOSIS — Z00.00 ENCOUNTER FOR ANNUAL HEALTH EXAMINATION: Primary | ICD-10-CM

## 2020-06-20 DIAGNOSIS — H52.203 HYPEROPIA OF BOTH EYES WITH ASTIGMATISM AND PRESBYOPIA: ICD-10-CM

## 2020-06-20 DIAGNOSIS — H43.391 FLOATER, VITREOUS, RIGHT: ICD-10-CM

## 2020-06-20 DIAGNOSIS — H52.4 HYPEROPIA OF BOTH EYES WITH ASTIGMATISM AND PRESBYOPIA: ICD-10-CM

## 2020-06-20 DIAGNOSIS — N18.4 CKD (CHRONIC KIDNEY DISEASE) STAGE 4, GFR 15-29 ML/MIN (HCC): ICD-10-CM

## 2020-06-20 DIAGNOSIS — M47.812 FACET ARTHRITIS OF CERVICAL REGION: ICD-10-CM

## 2020-06-20 DIAGNOSIS — E78.5 HYPERLIPIDEMIA, UNSPECIFIED HYPERLIPIDEMIA TYPE: ICD-10-CM

## 2020-06-20 DIAGNOSIS — N81.4 UTERINE PROLAPSE: ICD-10-CM

## 2020-06-20 DIAGNOSIS — I10 ESSENTIAL HYPERTENSION: ICD-10-CM

## 2020-06-20 DIAGNOSIS — Z12.31 ENCOUNTER FOR SCREENING MAMMOGRAM FOR BREAST CANCER: ICD-10-CM

## 2020-06-20 DIAGNOSIS — I73.9 PERIPHERAL VASCULAR DISEASE (HCC): ICD-10-CM

## 2020-06-20 DIAGNOSIS — H25.13 AGE-RELATED NUCLEAR CATARACT OF BOTH EYES: ICD-10-CM

## 2020-06-20 DIAGNOSIS — M54.12 CERVICAL RADICULOPATHY: ICD-10-CM

## 2020-06-20 DIAGNOSIS — Z12.11 COLON CANCER SCREENING: ICD-10-CM

## 2020-06-20 DIAGNOSIS — H52.03 HYPEROPIA OF BOTH EYES WITH ASTIGMATISM AND PRESBYOPIA: ICD-10-CM

## 2020-06-20 PROCEDURE — G0439 PPPS, SUBSEQ VISIT: HCPCS | Performed by: INTERNAL MEDICINE

## 2020-06-20 PROCEDURE — 99397 PER PM REEVAL EST PAT 65+ YR: CPT | Performed by: INTERNAL MEDICINE

## 2020-06-20 PROCEDURE — 96160 PT-FOCUSED HLTH RISK ASSMT: CPT | Performed by: INTERNAL MEDICINE

## 2020-06-20 PROCEDURE — 36415 COLL VENOUS BLD VENIPUNCTURE: CPT

## 2020-06-20 PROCEDURE — 80048 BASIC METABOLIC PNL TOTAL CA: CPT

## 2020-06-20 RX ORDER — HYDROCHLOROTHIAZIDE 12.5 MG/1
12.5 TABLET ORAL DAILY
Qty: 90 TABLET | Refills: 1 | Status: SHIPPED | OUTPATIENT
Start: 2020-06-20 | End: 2020-07-06

## 2020-06-20 RX ORDER — CLOTRIMAZOLE AND BETAMETHASONE DIPROPIONATE 10; .64 MG/G; MG/G
1 CREAM TOPICAL 2 TIMES DAILY PRN
Qty: 15 G | Refills: 3 | Status: SHIPPED | OUTPATIENT
Start: 2020-06-20

## 2020-06-20 RX ORDER — LOSARTAN POTASSIUM 100 MG/1
100 TABLET ORAL DAILY
Qty: 90 TABLET | Refills: 1 | Status: SHIPPED | OUTPATIENT
Start: 2020-06-20 | End: 2020-11-09

## 2020-06-20 NOTE — PROGRESS NOTES
HPI:   Maria D Sloan is a 76year old female who presents for a MA (Medicare Advantage) Supervisit (Once per calendar year). Patient is here for Medicare advantage AHA visit and follow-up on chronic medical problems as listed below.   Last without help      She has Toileting difficulties based on screening of functional status. Toileting: Cannot do without help  She has Eating difficulties based on screening of functional status.    Eating: Cannot do without help  She has Driving difficulti quit greater than 12 months ago.   Social History    Tobacco Use      Smoking status: Former Smoker        Packs/day: 1.00        Years: 9.00        Pack years: 5        Quit date: 1983        Years since quittin.4      Smokeless tobacco: Never Use cataract surgery, starting after the surgery. Continue for a total of  7 days. prednisoLONE acetate 1 % Ophthalmic Suspension, Start one drop, 4 times a day to the eye having cataract surgery. Start after the surgery.   amLODIPine Besylate 10 MG Oral Tab She has a 9.00 pack-year smoking history. She has never used smokeless tobacco. She reports that she does not drink alcohol or use drugs. REVIEW OF SYSTEMS:   Review of Systems   Constitutional: Negative for chills, fatigue and fever.    HENT: Negative themselves:  Sometimes   I especially have trouble understanding the speech of women and children:  No I have trouble understanding the speaker in a large room such as at a meeting or place of Rastafari:  Sometimes   Many people I talk to seem to mumble (or Immunization History   Administered Date(s) Administered   • None   Pended Date(s) Pended   • Influenza Vaccine Refused 09/23/2019        ASSESSMENT AND OTHER RELEVANT CHRONIC CONDITIONS:   Eugenia Sever is a 76year old female who presents both eyes  Stable. Follow-up with ophthalmology. 13. Hyperopia of both eyes with astigmatism and presbyopia  Stable. Follow-up with ophthalmology.       Diet assessment: good     PLAN:  The patient indicates understanding of these issues and agrees to Pelvic      Pap: Every 3 yrs age 21-68 or Pap+HPV every 5 yrs age 33-67, age 72 and older at high risk There are no preventive care reminders to display for this patient.  Update Health Maintenance if applicable    Chlamydia  Annually if high risk No result flowsheet data found. Drug Serum Conc  Annually No results found for: DIGOXIN, DIG, VALP No flowsheet data found.                Template: [facility] MEDICARE ANNUAL ASSESSMENT FEMALE Lew Yates [[id number]]

## 2020-06-20 NOTE — PATIENT INSTRUCTIONS
Sanju Ortiz's SCREENING SCHEDULE   Tests on this list are recommended by your physician but may not be covered, or covered at this frequency, by your insurer. Please check with your insurance carrier before scheduling to verify coverage. years- more often if abnormal Colonoscopy due on 09/01/2019 Update [facility] if applicable    Flex Sigmoidoscopy Screen  Covered every 5 years No results found for this or any previous visit. No flowsheet data found.      Fecal Occult Blood   Cover orders found for this or any previous visit. Please get once after your 65th birthday    Hepatitis B for Moderate/High Risk       No orders found for this or any previous visit.  Medium/high risk factors:   End-stage renal disease   Hemophiliacs who receive
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
## 2020-06-21 PROBLEM — R21 RASH: Status: RESOLVED | Noted: 2019-09-23 | Resolved: 2020-06-21

## 2020-06-21 PROBLEM — J41.0 SMOKERS' COUGH (HCC): Chronic | Status: ACTIVE | Noted: 2020-06-21

## 2020-06-21 PROBLEM — J41.0 SMOKERS' COUGH (HCC): Chronic | Status: RESOLVED | Noted: 2020-06-21 | Resolved: 2020-06-21

## 2020-06-21 PROBLEM — R05.9 COUGH: Status: RESOLVED | Noted: 2019-09-23 | Resolved: 2020-06-21

## 2020-06-21 RX ORDER — TRIAMCINOLONE ACETONIDE 1 MG/G
1 OINTMENT TOPICAL 2 TIMES DAILY
Qty: 45 G | Refills: 1 | Status: SHIPPED | OUTPATIENT
Start: 2020-06-21 | End: 2020-07-27

## 2020-06-22 NOTE — PROGRESS NOTES
THE [facility] [place],    Your sugar level is 106. We would like to see this below 100. Please follow a low sugar diet. Your electrolytes are normal.  Your BUN 42 is showing dehydration. Please hydrate daily.   Please make an appointment with Dr. Santhosh Adames

## 2020-06-23 ENCOUNTER — APPOINTMENT (OUTPATIENT)
Dept: MRI IMAGING | Age: 75
End: 2020-06-23
Attending: PSYCHIATRY & NEUROLOGY

## 2020-06-23 ENCOUNTER — TELEPHONE (OUTPATIENT)
Dept: INTERNAL MEDICINE CLINIC | Facility: CLINIC | Age: 75
End: 2020-06-23

## 2020-06-23 ENCOUNTER — APPOINTMENT (OUTPATIENT)
Dept: CT IMAGING | Age: 75
End: 2020-06-23
Attending: EMERGENCY MEDICINE

## 2020-06-23 ENCOUNTER — HOSPITAL ENCOUNTER (OUTPATIENT)
Age: 75
Setting detail: OBSERVATION
Discharge: HOME | End: 2020-06-24
Attending: EMERGENCY MEDICINE | Admitting: HOSPITALIST

## 2020-06-23 DIAGNOSIS — I63.511 RIGHT MIDDLE CEREBRAL ARTERY STROKE (CMD): ICD-10-CM

## 2020-06-23 DIAGNOSIS — I63.89 INFARCTION OF PARIETAL LOBE (CMD): Primary | ICD-10-CM

## 2020-06-23 LAB
ALBUMIN SERPL-MCNC: 4.1 G/DL (ref 3.6–5.1)
ALBUMIN/GLOB SERPL: 1.1 {RATIO} (ref 1–2.4)
ALP SERPL-CCNC: 68 UNITS/L (ref 45–117)
ALT SERPL-CCNC: 22 UNITS/L
ANION GAP SERPL CALC-SCNC: 9 MMOL/L (ref 10–20)
APTT PPP: 26 SEC (ref 22–32)
AST SERPL-CCNC: 16 UNITS/L
ATRIAL RATE (BPM): 55
BASOPHILS # BLD: 0.1 K/MCL (ref 0–0.3)
BASOPHILS NFR BLD: 1 %
BILIRUB SERPL-MCNC: 0.8 MG/DL (ref 0.2–1)
BUN SERPL-MCNC: 34 MG/DL (ref 6–20)
BUN/CREAT SERPL: 18 (ref 7–25)
CALCIUM SERPL-MCNC: 8.9 MG/DL (ref 8.4–10.2)
CHLORIDE SERPL-SCNC: 108 MMOL/L (ref 98–107)
CO2 SERPL-SCNC: 28 MMOL/L (ref 21–32)
CREAT SERPL-MCNC: 1.89 MG/DL (ref 0.51–0.95)
DIFFERENTIAL METHOD BLD: NORMAL
EOSINOPHIL # BLD: 0.1 K/MCL (ref 0.1–0.5)
EOSINOPHIL NFR BLD: 2 %
ERYTHROCYTE [DISTWIDTH] IN BLOOD: 13.2 % (ref 11–15)
GLOBULIN SER-MCNC: 3.6 G/DL (ref 2–4)
GLUCOSE SERPL-MCNC: 101 MG/DL (ref 65–99)
HCT VFR BLD CALC: 37.1 % (ref 36–46.5)
HGB BLD-MCNC: 12.7 G/DL (ref 12–15.5)
IMM GRANULOCYTES # BLD AUTO: 0 K/MCL (ref 0–0.2)
IMM GRANULOCYTES NFR BLD: 0 %
INR PPP: 1
LYMPHOCYTES # BLD: 1.3 K/MCL (ref 1–4)
LYMPHOCYTES NFR BLD: 17 %
MAGNESIUM SERPL-MCNC: 2.4 MG/DL (ref 1.7–2.4)
MCH RBC QN AUTO: 31 PG (ref 26–34)
MCHC RBC AUTO-ENTMCNC: 34.2 G/DL (ref 32–36.5)
MCV RBC AUTO: 90.5 FL (ref 78–100)
MONOCYTES # BLD: 0.8 K/MCL (ref 0.3–0.9)
MONOCYTES NFR BLD: 10 %
NEUTROPHILS # BLD: 5.2 K/MCL (ref 1.8–7.7)
NEUTROPHILS NFR BLD: 70 %
NRBC BLD MANUAL-RTO: 0 /100 WBC
PLATELET # BLD: 167 K/MCL (ref 140–450)
POTASSIUM SERPL-SCNC: 4.2 MMOL/L (ref 3.4–5.1)
PR-INTERVAL (MSEC): 120
PROT SERPL-MCNC: 7.7 G/DL (ref 6.4–8.2)
PROTHROMBIN TIME: 10.7 SEC (ref 9.7–11.8)
QRS-INTERVAL (MSEC): 72
QT-INTERVAL (MSEC): 432
QTC: 413
R AXIS (DEGREES): -9
RBC # BLD: 4.1 MIL/MCL (ref 4–5.2)
REPORT TEXT: NORMAL
SODIUM SERPL-SCNC: 141 MMOL/L (ref 135–145)
T AXIS (DEGREES): 9
TROPONIN I SERPL HS-MCNC: <0.02 NG/ML
VENTRICULAR RATE EKG/MIN (BPM): 55
WBC # BLD: 7.4 K/MCL (ref 4.2–11)

## 2020-06-23 PROCEDURE — 10002800 HB RX 250 W HCPCS: Performed by: HOSPITALIST

## 2020-06-23 PROCEDURE — 10002803 HB RX 637: Performed by: PSYCHIATRY & NEUROLOGY

## 2020-06-23 PROCEDURE — 36415 COLL VENOUS BLD VENIPUNCTURE: CPT

## 2020-06-23 PROCEDURE — 87635 SARS-COV-2 COVID-19 AMP PRB: CPT

## 2020-06-23 PROCEDURE — 80053 COMPREHEN METABOLIC PANEL: CPT

## 2020-06-23 PROCEDURE — 83735 ASSAY OF MAGNESIUM: CPT

## 2020-06-23 PROCEDURE — 10004651 HB RX, NO CHARGE ITEM: Performed by: PSYCHIATRY & NEUROLOGY

## 2020-06-23 PROCEDURE — 96374 THER/PROPH/DIAG INJ IV PUSH: CPT

## 2020-06-23 PROCEDURE — 70450 CT HEAD/BRAIN W/O DYE: CPT

## 2020-06-23 PROCEDURE — 85730 THROMBOPLASTIN TIME PARTIAL: CPT

## 2020-06-23 PROCEDURE — 70549 MR ANGIOGRAPH NECK W/O&W/DYE: CPT

## 2020-06-23 PROCEDURE — 99285 EMERGENCY DEPT VISIT HI MDM: CPT

## 2020-06-23 PROCEDURE — 70553 MRI BRAIN STEM W/O & W/DYE: CPT

## 2020-06-23 PROCEDURE — 10004651 HB RX, NO CHARGE ITEM: Performed by: HOSPITALIST

## 2020-06-23 PROCEDURE — 85610 PROTHROMBIN TIME: CPT

## 2020-06-23 PROCEDURE — 70544 MR ANGIOGRAPHY HEAD W/O DYE: CPT

## 2020-06-23 PROCEDURE — A9585 GADOBUTROL INJECTION: HCPCS | Performed by: PSYCHIATRY & NEUROLOGY

## 2020-06-23 PROCEDURE — 84484 ASSAY OF TROPONIN QUANT: CPT

## 2020-06-23 PROCEDURE — 10002805 HB CONTRAST AGENT: Performed by: PSYCHIATRY & NEUROLOGY

## 2020-06-23 PROCEDURE — G0378 HOSPITAL OBSERVATION PER HR: HCPCS

## 2020-06-23 PROCEDURE — 85025 COMPLETE CBC W/AUTO DIFF WBC: CPT

## 2020-06-23 PROCEDURE — 72125 CT NECK SPINE W/O DYE: CPT

## 2020-06-23 PROCEDURE — 93005 ELECTROCARDIOGRAM TRACING: CPT | Performed by: EMERGENCY MEDICINE

## 2020-06-23 RX ORDER — POTASSIUM CHLORIDE 14.9 MG/ML
20 INJECTION INTRAVENOUS EVERY 4 HOURS PRN
Status: DISCONTINUED | OUTPATIENT
Start: 2020-06-23 | End: 2020-06-24 | Stop reason: HOSPADM

## 2020-06-23 RX ORDER — HYDRALAZINE HYDROCHLORIDE 20 MG/ML
10 INJECTION INTRAMUSCULAR; INTRAVENOUS EVERY 6 HOURS PRN
Status: DISCONTINUED | OUTPATIENT
Start: 2020-06-23 | End: 2020-06-24 | Stop reason: HOSPADM

## 2020-06-23 RX ORDER — HYDROCHLOROTHIAZIDE 25 MG/1
12.5 TABLET ORAL DAILY
COMMUNITY

## 2020-06-23 RX ORDER — ASPIRIN 81 MG/1
81 TABLET, CHEWABLE ORAL ONCE
Status: DISCONTINUED | OUTPATIENT
Start: 2020-06-23 | End: 2020-06-23 | Stop reason: CLARIF

## 2020-06-23 RX ORDER — LOSARTAN POTASSIUM 50 MG/1
100 TABLET ORAL DAILY
Status: DISCONTINUED | OUTPATIENT
Start: 2020-06-23 | End: 2020-06-24 | Stop reason: HOSPADM

## 2020-06-23 RX ORDER — ONDANSETRON 2 MG/ML
4 INJECTION INTRAMUSCULAR; INTRAVENOUS 2 TIMES DAILY PRN
Status: DISCONTINUED | OUTPATIENT
Start: 2020-06-23 | End: 2020-06-24 | Stop reason: HOSPADM

## 2020-06-23 RX ORDER — 0.9 % SODIUM CHLORIDE 0.9 %
2 VIAL (ML) INJECTION EVERY 12 HOURS SCHEDULED
Status: DISCONTINUED | OUTPATIENT
Start: 2020-06-23 | End: 2020-06-24 | Stop reason: HOSPADM

## 2020-06-23 RX ORDER — ATORVASTATIN CALCIUM 40 MG/1
40 TABLET, FILM COATED ORAL NIGHTLY
Status: DISCONTINUED | OUTPATIENT
Start: 2020-06-23 | End: 2020-06-24

## 2020-06-23 RX ORDER — HYDROCHLOROTHIAZIDE 12.5 MG/1
12.5 TABLET ORAL DAILY
Status: DISCONTINUED | OUTPATIENT
Start: 2020-06-23 | End: 2020-06-24 | Stop reason: HOSPADM

## 2020-06-23 RX ORDER — POTASSIUM CHLORIDE 20 MEQ/1
20 TABLET, EXTENDED RELEASE ORAL EVERY 4 HOURS PRN
Status: DISCONTINUED | OUTPATIENT
Start: 2020-06-23 | End: 2020-06-24 | Stop reason: HOSPADM

## 2020-06-23 RX ORDER — NEBIVOLOL 20 MG/1
20 TABLET ORAL DAILY
COMMUNITY
End: 2023-02-12 | Stop reason: CLARIF

## 2020-06-23 RX ORDER — AMLODIPINE BESYLATE 10 MG/1
10 TABLET ORAL DAILY
COMMUNITY

## 2020-06-23 RX ORDER — BUTALBITAL, ACETAMINOPHEN AND CAFFEINE 50; 325; 40 MG/1; MG/1; MG/1
1 TABLET ORAL EVERY 4 HOURS PRN
Status: DISCONTINUED | OUTPATIENT
Start: 2020-06-23 | End: 2020-06-24 | Stop reason: HOSPADM

## 2020-06-23 RX ORDER — POTASSIUM CHLORIDE 1.5 G/1.58G
40 POWDER, FOR SOLUTION ORAL EVERY 4 HOURS PRN
Status: DISCONTINUED | OUTPATIENT
Start: 2020-06-23 | End: 2020-06-24 | Stop reason: HOSPADM

## 2020-06-23 RX ORDER — POTASSIUM CHLORIDE 20 MEQ/1
40 TABLET, EXTENDED RELEASE ORAL EVERY 4 HOURS PRN
Status: DISCONTINUED | OUTPATIENT
Start: 2020-06-23 | End: 2020-06-24 | Stop reason: HOSPADM

## 2020-06-23 RX ORDER — GADOBUTROL 604.72 MG/ML
10 INJECTION INTRAVENOUS ONCE
Status: COMPLETED | OUTPATIENT
Start: 2020-06-23 | End: 2020-06-23

## 2020-06-23 RX ORDER — LOSARTAN POTASSIUM 100 MG/1
100 TABLET ORAL DAILY
COMMUNITY

## 2020-06-23 RX ORDER — MAGNESIUM SULFATE 1 G/100ML
1 INJECTION INTRAVENOUS DAILY PRN
Status: DISCONTINUED | OUTPATIENT
Start: 2020-06-23 | End: 2020-06-24 | Stop reason: HOSPADM

## 2020-06-23 RX ORDER — ASPIRIN 81 MG/1
81 TABLET, CHEWABLE ORAL DAILY
Status: ON HOLD | COMMUNITY
End: 2020-06-24 | Stop reason: HOSPADM

## 2020-06-23 RX ORDER — ACETAMINOPHEN 325 MG/1
650 TABLET ORAL EVERY 4 HOURS PRN
Status: DISCONTINUED | OUTPATIENT
Start: 2020-06-23 | End: 2020-06-24 | Stop reason: HOSPADM

## 2020-06-23 RX ORDER — POTASSIUM CHLORIDE 1.5 G/1.58G
20 POWDER, FOR SOLUTION ORAL EVERY 4 HOURS PRN
Status: DISCONTINUED | OUTPATIENT
Start: 2020-06-23 | End: 2020-06-24 | Stop reason: HOSPADM

## 2020-06-23 RX ADMIN — DESMOPRESSIN ACETATE 40 MG: 0.2 TABLET ORAL at 21:51

## 2020-06-23 RX ADMIN — ASPIRIN 81 MG: 81 TABLET, COATED ORAL at 17:30

## 2020-06-23 RX ADMIN — GADOBUTROL 10 ML: 604.72 INJECTION INTRAVENOUS at 16:00

## 2020-06-23 RX ADMIN — SODIUM CHLORIDE, PRESERVATIVE FREE 2 ML: 5 INJECTION INTRAVENOUS at 21:55

## 2020-06-23 RX ADMIN — HYDRALAZINE HYDROCHLORIDE 10 MG: 20 INJECTION INTRAMUSCULAR; INTRAVENOUS at 21:50

## 2020-06-23 ASSESSMENT — ACTIVITIES OF DAILY LIVING (ADL)
RECENT_DECLINE_ADL: NO
ADL_BEFORE_ADMISSION: INDEPENDENT
CHRONIC_PAIN_PRESENT: NO
ADL_SHORT_OF_BREATH: NO
ADL_SCORE: 12

## 2020-06-23 ASSESSMENT — COGNITIVE AND FUNCTIONAL STATUS - GENERAL
BECAUSE OF A PHYSICAL, MENTAL, OR EMOTIONAL CONDITION, DO YOU HAVE SERIOUS DIFFICULTY CONCENTRATING, REMEMBERING OR MAKING DECISIONS: NO
ARE YOU DEAF OR DO YOU HAVE SERIOUS DIFFICULTY  HEARING: NO
DO YOU HAVE DIFFICULTY DRESSING OR BATHING: NO
ARE YOU BLIND OR DO YOU HAVE SERIOUS DIFFICULTY SEEING, EVEN WHEN WEARING GLASSES: NO
BECAUSE OF A PHYSICAL, MENTAL, OR EMOTIONAL CONDITION, DO YOU HAVE DIFFICULTY DOING ERRANDS ALONE: NO
DO YOU HAVE SERIOUS DIFFICULTY WALKING OR CLIMBING STAIRS: NO

## 2020-06-23 ASSESSMENT — ENCOUNTER SYMPTOMS
NAUSEA: 0
ABDOMINAL PAIN: 0
BACK PAIN: 0
SINUS PAIN: 0
VOMITING: 0
DIZZINESS: 0
EYES NEGATIVE: 1
SHORTNESS OF BREATH: 0
CONFUSION: 0
HEADACHES: 0
CONSTITUTIONAL NEGATIVE: 1
ENDOCRINE NEGATIVE: 1
COUGH: 0
NERVOUS/ANXIOUS: 0
FEVER: 0
ADENOPATHY: 0
WEAKNESS: 0
CHEST TIGHTNESS: 0
ALLERGIC/IMMUNOLOGIC NEGATIVE: 1
ABDOMINAL DISTENTION: 0

## 2020-06-23 ASSESSMENT — PATIENT HEALTH QUESTIONNAIRE - PHQ9
2. FEELING DOWN, DEPRESSED OR HOPELESS: NOT AT ALL
CLINICAL INTERPRETATION OF PHQ2 SCORE: NO FURTHER SCREENING NEEDED
IS PATIENT ABLE TO COMPLETE PHQ2 OR PHQ9: YES
1. LITTLE INTEREST OR PLEASURE IN DOING THINGS: NOT AT ALL
SUM OF ALL RESPONSES TO PHQ9 QUESTIONS 1 AND 2: 0
CLINICAL INTERPRETATION OF PHQ9 SCORE: NO FURTHER SCREENING NEEDED
SUM OF ALL RESPONSES TO PHQ9 QUESTIONS 1 AND 2: 0

## 2020-06-23 ASSESSMENT — LIFESTYLE VARIABLES
HOW MANY STANDARD DRINKS CONTAINING ALCOHOL DO YOU HAVE ON A TYPICAL DAY: 0,1 OR 2
HOW OFTEN DO YOU HAVE 6 OR MORE DRINKS ON ONE OCCASION: NEVER
AUDIT-C TOTAL SCORE: 0
HOW OFTEN DO YOU HAVE A DRINK CONTAINING ALCOHOL: NEVER

## 2020-06-23 ASSESSMENT — COLUMBIA-SUICIDE SEVERITY RATING SCALE - C-SSRS
1. WITHIN THE PAST MONTH, HAVE YOU WISHED YOU WERE DEAD OR WISHED YOU COULD GO TO SLEEP AND NOT WAKE UP?: NO
IS THE PATIENT ABLE TO COMPLETE C-SSRS: YES
6. HAVE YOU EVER DONE ANYTHING, STARTED TO DO ANYTHING, OR PREPARED TO DO ANYTHING TO END YOUR LIFE?: NO
2. HAVE YOU ACTUALLY HAD ANY THOUGHTS OF KILLING YOURSELF?: NO

## 2020-06-23 ASSESSMENT — PAIN SCALES - GENERAL
PAINLEVEL_OUTOF10: 3
PAINLEVEL_OUTOF10: 3
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
## 2020-06-24 ENCOUNTER — APPOINTMENT (OUTPATIENT)
Dept: CARDIOLOGY | Age: 75
End: 2020-06-24
Attending: PSYCHIATRY & NEUROLOGY

## 2020-06-24 VITALS
WEIGHT: 139.33 LBS | RESPIRATION RATE: 16 BRPM | TEMPERATURE: 98.8 F | BODY MASS INDEX: 31.34 KG/M2 | SYSTOLIC BLOOD PRESSURE: 125 MMHG | HEART RATE: 74 BPM | HEIGHT: 56 IN | DIASTOLIC BLOOD PRESSURE: 78 MMHG | OXYGEN SATURATION: 68 %

## 2020-06-24 LAB
ANION GAP SERPL CALC-SCNC: 11 MMOL/L (ref 10–20)
BUN SERPL-MCNC: 28 MG/DL (ref 6–20)
BUN/CREAT SERPL: 18 (ref 7–25)
CALCIUM SERPL-MCNC: 8.7 MG/DL (ref 8.4–10.2)
CHLORIDE SERPL-SCNC: 106 MMOL/L (ref 98–107)
CHOLEST SERPL-MCNC: 217 MG/DL
CHOLEST/HDLC SERPL: 4.9 {RATIO}
CO2 SERPL-SCNC: 27 MMOL/L (ref 21–32)
CREAT SERPL-MCNC: 1.59 MG/DL (ref 0.51–0.95)
GLUCOSE SERPL-MCNC: 100 MG/DL (ref 65–99)
HBA1C MFR BLD: 5.4 % (ref 4.5–5.6)
HDLC SERPL-MCNC: 44 MG/DL
LDLC SERPL CALC-MCNC: 133 MG/DL
NONHDLC SERPL-MCNC: 173 MG/DL
POTASSIUM SERPL-SCNC: 4.1 MMOL/L (ref 3.4–5.1)
SARS-COV-2 RNA RESP QL NAA+PROBE: NOT DETECTED
SERVICE CMNT-IMP: NORMAL
SODIUM SERPL-SCNC: 140 MMOL/L (ref 135–145)
SPECIMEN SOURCE: NORMAL
TRIGL SERPL-MCNC: 199 MG/DL

## 2020-06-24 PROCEDURE — G0378 HOSPITAL OBSERVATION PER HR: HCPCS

## 2020-06-24 PROCEDURE — 10002803 HB RX 637: Performed by: HOSPITALIST

## 2020-06-24 PROCEDURE — 93306 TTE W/DOPPLER COMPLETE: CPT

## 2020-06-24 PROCEDURE — 97165 OT EVAL LOW COMPLEX 30 MIN: CPT

## 2020-06-24 PROCEDURE — 80061 LIPID PANEL: CPT

## 2020-06-24 PROCEDURE — 10002803 HB RX 637: Performed by: PSYCHIATRY & NEUROLOGY

## 2020-06-24 PROCEDURE — 36415 COLL VENOUS BLD VENIPUNCTURE: CPT

## 2020-06-24 PROCEDURE — 83036 HEMOGLOBIN GLYCOSYLATED A1C: CPT

## 2020-06-24 PROCEDURE — 93270 REMOTE 30 DAY ECG REV/REPORT: CPT

## 2020-06-24 PROCEDURE — 97530 THERAPEUTIC ACTIVITIES: CPT

## 2020-06-24 PROCEDURE — 80048 BASIC METABOLIC PNL TOTAL CA: CPT

## 2020-06-24 PROCEDURE — 97163 PT EVAL HIGH COMPLEX 45 MIN: CPT

## 2020-06-24 PROCEDURE — 10004651 HB RX, NO CHARGE ITEM: Performed by: HOSPITALIST

## 2020-06-24 RX ORDER — ATORVASTATIN CALCIUM 40 MG/1
40 TABLET, FILM COATED ORAL NIGHTLY
Qty: 30 TABLET | Refills: 0 | Status: SHIPPED | OUTPATIENT
Start: 2020-06-24

## 2020-06-24 RX ORDER — ATORVASTATIN CALCIUM 40 MG/1
40 TABLET, FILM COATED ORAL NIGHTLY
Status: DISCONTINUED | OUTPATIENT
Start: 2020-06-24 | End: 2020-06-24 | Stop reason: HOSPADM

## 2020-06-24 RX ORDER — ATORVASTATIN CALCIUM 80 MG/1
80 TABLET, FILM COATED ORAL NIGHTLY
Status: DISCONTINUED | OUTPATIENT
Start: 2020-06-24 | End: 2020-06-24

## 2020-06-24 RX ORDER — CLOPIDOGREL 300 MG/1
300 TABLET, FILM COATED ORAL ONCE
Status: COMPLETED | OUTPATIENT
Start: 2020-06-24 | End: 2020-06-24

## 2020-06-24 RX ORDER — CLOPIDOGREL BISULFATE 75 MG/1
75 TABLET ORAL DAILY
Status: DISCONTINUED | OUTPATIENT
Start: 2020-06-25 | End: 2020-06-24 | Stop reason: HOSPADM

## 2020-06-24 RX ORDER — CLOPIDOGREL BISULFATE 75 MG/1
75 TABLET ORAL DAILY
Qty: 90 TABLET | Refills: 3 | Status: SHIPPED | OUTPATIENT
Start: 2020-06-25

## 2020-06-24 RX ORDER — BUTALBITAL, ACETAMINOPHEN AND CAFFEINE 50; 325; 40 MG/1; MG/1; MG/1
1 TABLET ORAL EVERY 4 HOURS PRN
Qty: 30 TABLET | Refills: 0 | Status: SHIPPED | OUTPATIENT
Start: 2020-06-24 | End: 2023-02-12 | Stop reason: CLARIF

## 2020-06-24 RX ADMIN — BUTALBITAL, ACETAMINOPHEN AND CAFFEINE 1 TABLET: 50; 325; 40 TABLET ORAL at 11:13

## 2020-06-24 RX ADMIN — HYDROCHLOROTHIAZIDE 12.5 MG: 12.5 TABLET ORAL at 11:13

## 2020-06-24 RX ADMIN — LOSARTAN POTASSIUM 100 MG: 50 TABLET, FILM COATED ORAL at 11:13

## 2020-06-24 RX ADMIN — BUTALBITAL, ACETAMINOPHEN AND CAFFEINE 1 TABLET: 50; 325; 40 TABLET ORAL at 00:04

## 2020-06-24 RX ADMIN — CLOPIDOGREL BISULFATE 300 MG: 300 TABLET, FILM COATED ORAL at 14:45

## 2020-06-24 RX ADMIN — SODIUM CHLORIDE, PRESERVATIVE FREE 2 ML: 5 INJECTION INTRAVENOUS at 11:14

## 2020-06-24 ASSESSMENT — COGNITIVE AND FUNCTIONAL STATUS - GENERAL
HELP NEEDED DRESSING REGULAR UPPER BODY CLOTHING: A LITTLE
HELP NEEDED DRESSING REGULAR LOWER BODY CLOTHING: A LITTLE
DAILY_ACTIVITY_RAW_SCORE: 18
BASIC_MOBILITY_CONVERTED_SCORE: 41.05
HELP NEEDED FOR BATHING: A LITTLE
HELP NEEDED FOR TOILETING: A LITTLE
HELP NEEDED FOR PERSONAL GROOMING: A LITTLE
BASIC_MOBILITY_RAW_SCORE: 18
DAILY_ACTIVITY_CONVERTED_SCORE: 38.66

## 2020-06-24 ASSESSMENT — ACTIVITIES OF DAILY LIVING (ADL)
EATING: INDEPENDENT
PRIOR_ADL_TOILETING: INDEPENDENT
PRIOR_ADL_BATHING: INDEPENDENT
GROOMING: INDEPENDENT

## 2020-06-24 ASSESSMENT — PAIN SCALES - GENERAL: PAINLEVEL_OUTOF10: 5

## 2020-06-24 NOTE — TELEPHONE ENCOUNTER
Spoke to patient and she states she went to [facility] and was admitted for observation, she is awaiting CT and MRI results.

## 2020-06-25 ENCOUNTER — TELEPHONE (OUTPATIENT)
Dept: INTERNAL MEDICINE CLINIC | Facility: CLINIC | Age: 75
End: 2020-06-25

## 2020-06-25 PROBLEM — I63.511 ACUTE ISCHEMIC RIGHT MCA STROKE (HCC): Status: ACTIVE | Noted: 2020-06-25

## 2020-06-25 NOTE — TELEPHONE ENCOUNTER
Appt scheduled Asc Procedure Text (F): After obtaining clear surgical margins the patient was sent to an ASC for surgical repair.  The patient understands they will receive post-surgical care and follow-up from the ASC physician.

## 2020-06-25 NOTE — TELEPHONE ENCOUNTER
Patient called, as she was released from the hospital yesterday. She declined making a follow up appointment, but asked for Marichuy Orona to give her a call.

## 2020-06-26 ENCOUNTER — TELEPHONE (OUTPATIENT)
Dept: INTERNAL MEDICINE CLINIC | Facility: CLINIC | Age: 75
End: 2020-06-26

## 2020-06-26 NOTE — TELEPHONE ENCOUNTER
Patient states she just released from the hospital and is requesting to see Dr. James Turcios for a hospital follow up. Please advise if okay to book in office hospital follow up.

## 2020-07-06 ENCOUNTER — OFFICE VISIT (OUTPATIENT)
Dept: INTERNAL MEDICINE CLINIC | Facility: CLINIC | Age: 75
End: 2020-07-06
Payer: MEDICARE

## 2020-07-06 VITALS
HEART RATE: 58 BPM | HEIGHT: 56 IN | DIASTOLIC BLOOD PRESSURE: 66 MMHG | WEIGHT: 139.5 LBS | RESPIRATION RATE: 18 BRPM | SYSTOLIC BLOOD PRESSURE: 150 MMHG | BODY MASS INDEX: 31.38 KG/M2

## 2020-07-06 DIAGNOSIS — I10 ESSENTIAL HYPERTENSION: ICD-10-CM

## 2020-07-06 DIAGNOSIS — E78.5 HYPERLIPIDEMIA, UNSPECIFIED HYPERLIPIDEMIA TYPE: ICD-10-CM

## 2020-07-06 DIAGNOSIS — I73.9 PERIPHERAL VASCULAR DISEASE (HCC): ICD-10-CM

## 2020-07-06 DIAGNOSIS — I63.511 ACUTE ISCHEMIC RIGHT MCA STROKE (HCC): Primary | ICD-10-CM

## 2020-07-06 PROCEDURE — 99495 TRANSJ CARE MGMT MOD F2F 14D: CPT | Performed by: INTERNAL MEDICINE

## 2020-07-06 PROCEDURE — 1111F DSCHRG MED/CURRENT MED MERGE: CPT | Performed by: INTERNAL MEDICINE

## 2020-07-06 RX ORDER — OXYQUINOLINE SULFATE AND SODIUM LAURYL SULFATE .25; .1 MG/G; MG/G
JELLY VAGINAL
COMMUNITY
Start: 2020-01-14 | End: 2020-07-06

## 2020-07-06 RX ORDER — CLOPIDOGREL BISULFATE 75 MG/1
75 TABLET ORAL DAILY
COMMUNITY
Start: 2020-06-25 | End: 2021-06-21

## 2020-07-06 RX ORDER — NEBIVOLOL HYDROCHLORIDE 20 MG/1
40 TABLET ORAL DAILY
Qty: 180 TABLET | Refills: 1 | Status: SHIPPED | OUTPATIENT
Start: 2020-07-06 | End: 2021-01-18

## 2020-07-06 RX ORDER — HYDROCHLOROTHIAZIDE 25 MG/1
25 TABLET ORAL DAILY
Qty: 90 TABLET | Refills: 1 | Status: SHIPPED | OUTPATIENT
Start: 2020-07-06 | End: 2020-09-26

## 2020-07-06 RX ORDER — BUTALBITAL, ACETAMINOPHEN AND CAFFEINE 50; 325; 40 MG/1; MG/1; MG/1
TABLET ORAL
COMMUNITY
Start: 2020-06-24

## 2020-07-06 RX ORDER — ATORVASTATIN CALCIUM 40 MG/1
40 TABLET, FILM COATED ORAL NIGHTLY
COMMUNITY
Start: 2020-06-24 | End: 2020-07-22

## 2020-07-06 NOTE — PROGRESS NOTES
HPI:    Rory Conti is a 76year old female here today for hospital follow up.    She was discharged from Inpatient hospital, [address] [place] [address] Date: 6/23  Discharge Date: 6/24  Hospital Discharge Diagnosis:    stroke taking that on a daily basis. She is also wearing a heart monitor for the next few weeks. She has no physical residual.  There was never any dysarthria or dysphasia or unilateral loss of function.   She still does not feel quite back to normal.  She gets )  prednisoLONE acetate 1 % Ophthalmic Suspension, Start one drop, 4 times a day to the eye having cataract surgery. Start after the surgery.  (Patient not taking: Reported on 7/6/2020 )  VENTOLIN  (90 Base) MCG/ACT Inhalation Aero Soln, INHALE 2 PU PHYSICAL EXAM:   No LMP recorded. Patient is postmenopausal. Estimated body mass index is 31.28 kg/m² as calculated from the following:    Height as of this encounter: 4' 8\" (1.422 m). Weight as of this encounter: 139 lb 8 oz (63.3 kg).    / 20 up to 40 mg a day. Come back in 1 month. Contact the office if she is having any side effects. 3. Peripheral vascular disease (HCC)  Asymptomatic. Continue current treatment.     4. Hyperlipidemia, unspecified hyperlipidemia type  Continue with cur

## 2020-07-22 ENCOUNTER — TELEPHONE (OUTPATIENT)
Dept: INTERNAL MEDICINE CLINIC | Facility: CLINIC | Age: 75
End: 2020-07-22

## 2020-07-22 RX ORDER — ATORVASTATIN CALCIUM 40 MG/1
40 TABLET, FILM COATED ORAL NIGHTLY
Qty: 90 TABLET | Refills: 1 | Status: SHIPPED | OUTPATIENT
Start: 2020-07-22 | End: 2021-01-19

## 2020-07-25 ENCOUNTER — HOSPITAL ENCOUNTER (OUTPATIENT)
Dept: MAMMOGRAPHY | Facility: HOSPITAL | Age: 75
Discharge: HOME | End: 2020-07-25
Attending: INTERNAL MEDICINE
Payer: MEDICARE

## 2020-07-25 DIAGNOSIS — Z12.31 ENCOUNTER FOR SCREENING MAMMOGRAM FOR BREAST CANCER: ICD-10-CM

## 2020-07-25 PROCEDURE — 77067 SCR MAMMO BI INCL CAD: CPT | Performed by: INTERNAL MEDICINE

## 2020-07-25 PROCEDURE — 77063 BREAST TOMOSYNTHESIS BI: CPT | Performed by: INTERNAL MEDICINE

## 2020-07-28 NOTE — PROGRESS NOTES
Kristel Ortiz    Mammogram is normal    Repeat in 12 months    QUINCY Grace  Working with [facility]

## 2020-08-10 ENCOUNTER — OFFICE VISIT (OUTPATIENT)
Dept: INTERNAL MEDICINE CLINIC | Facility: CLINIC | Age: 75
End: 2020-08-10
Payer: MEDICARE

## 2020-08-10 VITALS
HEART RATE: 52 BPM | DIASTOLIC BLOOD PRESSURE: 67 MMHG | SYSTOLIC BLOOD PRESSURE: 121 MMHG | HEIGHT: 56 IN | WEIGHT: 138 LBS | BODY MASS INDEX: 31.05 KG/M2 | RESPIRATION RATE: 18 BRPM

## 2020-08-10 DIAGNOSIS — I10 ESSENTIAL HYPERTENSION: Primary | ICD-10-CM

## 2020-08-10 DIAGNOSIS — I63.511 ACUTE ISCHEMIC RIGHT MCA STROKE (HCC): ICD-10-CM

## 2020-08-10 PROCEDURE — 3008F BODY MASS INDEX DOCD: CPT | Performed by: INTERNAL MEDICINE

## 2020-08-10 PROCEDURE — 3078F DIAST BP <80 MM HG: CPT | Performed by: INTERNAL MEDICINE

## 2020-08-10 PROCEDURE — 3074F SYST BP LT 130 MM HG: CPT | Performed by: INTERNAL MEDICINE

## 2020-08-10 PROCEDURE — 99213 OFFICE O/P EST LOW 20 MIN: CPT | Performed by: INTERNAL MEDICINE

## 2020-08-10 NOTE — PROGRESS NOTES
HPI:    Patient ID: Karyle Levy is a 76year old female. HPI  Patient is here for follow-up on hypertension as well as stroke. She was last seen here on July 6.   At that time we increased the dose of the Bystolic from 20 up to 40 mg a day Positive for visual disturbance. Respiratory: Negative for cough and shortness of breath. Cardiovascular: Negative for chest pain and palpitations. Gastrointestinal: Negative for nausea, vomiting, abdominal pain, diarrhea and constipation.    Genitou HOURS AS NEEDED FOR WHEEZING OR COUGH 18 g 1   • Albuterol Sulfate HFA (VENTOLIN HFA) 108 (90 Base) MCG/ACT Inhalation Aero Soln Inhale 2 puffs into the lungs every 6 (six) hours as needed for Wheezing (cough) 1 Inhaler 1   • Vitamin B-12 1000 MCG Oral Tab stroke ([address]  Stable. Advised to follow any of the advice and instructions given by the neurologist.  She can take the Xanax as needed for anxiety but discussed the possible side effects.   I advised her to stop taking the Fioricet altogether for headaches

## 2020-09-01 ENCOUNTER — TELEPHONE (OUTPATIENT)
Dept: INTERNAL MEDICINE CLINIC | Facility: CLINIC | Age: 75
End: 2020-09-01

## 2020-09-01 NOTE — TELEPHONE ENCOUNTER
Patient scheduled pre- op appointment on 9/14. Surgery is scheduled with Dr Glendy Castillo on 9/24.     Dr. Armstrong [facility]  - [phone]

## 2020-09-14 ENCOUNTER — OFFICE VISIT (OUTPATIENT)
Dept: INTERNAL MEDICINE CLINIC | Facility: CLINIC | Age: 75
End: 2020-09-14
Payer: MEDICARE

## 2020-09-14 VITALS
HEART RATE: 56 BPM | RESPIRATION RATE: 18 BRPM | DIASTOLIC BLOOD PRESSURE: 68 MMHG | WEIGHT: 140 LBS | SYSTOLIC BLOOD PRESSURE: 132 MMHG | BODY MASS INDEX: 31.49 KG/M2 | HEIGHT: 56 IN

## 2020-09-14 DIAGNOSIS — I83.813 VARICOSE VEINS OF BOTH LOWER EXTREMITIES WITH PAIN: ICD-10-CM

## 2020-09-14 DIAGNOSIS — I10 ESSENTIAL HYPERTENSION: ICD-10-CM

## 2020-09-14 DIAGNOSIS — Z01.818 PRE-OP EXAMINATION: Primary | ICD-10-CM

## 2020-09-14 DIAGNOSIS — H26.9 CATARACT OF BOTH EYES, UNSPECIFIED CATARACT TYPE: ICD-10-CM

## 2020-09-14 DIAGNOSIS — I63.511 ACUTE ISCHEMIC RIGHT MCA STROKE (HCC): ICD-10-CM

## 2020-09-14 DIAGNOSIS — G47.00 INSOMNIA, UNSPECIFIED TYPE: ICD-10-CM

## 2020-09-14 PROCEDURE — 3075F SYST BP GE 130 - 139MM HG: CPT | Performed by: INTERNAL MEDICINE

## 2020-09-14 PROCEDURE — 3008F BODY MASS INDEX DOCD: CPT | Performed by: INTERNAL MEDICINE

## 2020-09-14 PROCEDURE — 99214 OFFICE O/P EST MOD 30 MIN: CPT | Performed by: INTERNAL MEDICINE

## 2020-09-14 PROCEDURE — 3078F DIAST BP <80 MM HG: CPT | Performed by: INTERNAL MEDICINE

## 2020-09-14 NOTE — PROGRESS NOTES
HPI:    Patient ID: Sherly Blake is a 76year old female. HPI  Patient is here for preoperative evaluation prior to undergoing cataract surgery. She will have the right eye treated on September 24 in the left eye on October 8.   This will b Smokeless tobacco: Never Used    Alcohol use: No      Alcohol/week: 0.0 standard drinks    Drug use: No         Review of Systems   Constitutional: Negative for chills, fatigue and fever. HENT: Negative for hearing loss.     Eyes: Positive for visual dist Take 1 tablet (100 mg total) by mouth daily. 90 tablet 1   • clotrimazole-betamethasone 1-0.05 % External Cream Apply 1 Application topically 2 (two) times daily as needed.  15 g 3   • amLODIPine Besylate 10 MG Oral Tab Take 1 tablet (10 mg total) by mouth mass. There is no tenderness. Musculoskeletal: She exhibits no tenderness. Lymphadenopathy:     She has no cervical adenopathy. Neurological: She is alert. Skin: Skin is warm and dry. No rash noted. Psychiatric: She has a normal mood and affect.  Sallie Erickson this time.          Meds This Visit:  Requested Prescriptions      No prescriptions requested or ordered in this encounter       Imaging & Referrals:  None         Carlos Eduardo Minor MD

## 2020-09-26 RX ORDER — HYDROCHLOROTHIAZIDE 25 MG/1
TABLET ORAL
Qty: 90 TABLET | Refills: 1 | Status: SHIPPED | OUTPATIENT
Start: 2020-09-26 | End: 2020-11-09

## 2020-09-29 ENCOUNTER — OFFICE VISIT (OUTPATIENT)
Dept: OBGYN CLINIC | Facility: CLINIC | Age: 75
End: 2020-09-29
Payer: MEDICARE

## 2020-09-29 VITALS — BODY MASS INDEX: 32 KG/M2 | SYSTOLIC BLOOD PRESSURE: 120 MMHG | DIASTOLIC BLOOD PRESSURE: 70 MMHG | WEIGHT: 142.63 LBS

## 2020-09-29 DIAGNOSIS — N81.4 UTERINE PROLAPSE: Primary | ICD-10-CM

## 2020-09-29 PROCEDURE — 3074F SYST BP LT 130 MM HG: CPT | Performed by: OBSTETRICS & GYNECOLOGY

## 2020-09-29 PROCEDURE — 57160 INSERT PESSARY/OTHER DEVICE: CPT | Performed by: OBSTETRICS & GYNECOLOGY

## 2020-09-29 PROCEDURE — 3078F DIAST BP <80 MM HG: CPT | Performed by: OBSTETRICS & GYNECOLOGY

## 2020-10-20 ENCOUNTER — TELEPHONE (OUTPATIENT)
Dept: INTERNAL MEDICINE CLINIC | Facility: CLINIC | Age: 75
End: 2020-10-20

## 2020-10-20 DIAGNOSIS — M48.00 SPINAL STENOSIS, UNSPECIFIED SPINAL REGION: Primary | ICD-10-CM

## 2020-10-20 DIAGNOSIS — G62.9 NEUROPATHY: ICD-10-CM

## 2020-10-20 NOTE — TELEPHONE ENCOUNTER
Patient's vascular surgeon will be sending an update in regards to patient's varicose veins. Per patient, surgeon is planning to have patient complete physical therapy and patient would like to know if Dr. Carleen Cronin opinion.

## 2020-10-23 NOTE — TELEPHONE ENCOUNTER
Please contact the patient I have reviewed the note from the vascular surgeon. He does not feel that the varicose veins are the cause of the patient's symptoms in the legs. He does not want to proceed with any sort of surgery or further testing.   He advi

## 2020-10-27 NOTE — TELEPHONE ENCOUNTER
Patient calling to get status of the pt order  Still in Open status   Patient advised that [facility] is [facility], may take up to 3 business days.  If Managed Care does not contact her within 3 days call  for assistance  After a

## 2020-11-02 ENCOUNTER — TELEPHONE (OUTPATIENT)
Dept: INTERNAL MEDICINE CLINIC | Facility: CLINIC | Age: 75
End: 2020-11-02

## 2020-11-02 DIAGNOSIS — H26.9 CATARACT, UNSPECIFIED CATARACT TYPE, UNSPECIFIED LATERALITY: Primary | ICD-10-CM

## 2020-11-02 NOTE — TELEPHONE ENCOUNTER
Patient called requesting a referral to see Dr. Dixie Alvarez. Pended referral. Please review diagnosis and sign off if you agree.     Thank you,  [facility] [phone]

## 2020-11-09 ENCOUNTER — LAB ENCOUNTER (OUTPATIENT)
Dept: LAB | Facility: HOSPITAL | Age: 75
End: 2020-11-09
Attending: INTERNAL MEDICINE
Payer: MEDICARE

## 2020-11-09 ENCOUNTER — OFFICE VISIT (OUTPATIENT)
Dept: INTERNAL MEDICINE CLINIC | Facility: CLINIC | Age: 75
End: 2020-11-09
Payer: MEDICARE

## 2020-11-09 VITALS
RESPIRATION RATE: 18 BRPM | HEIGHT: 56.5 IN | HEART RATE: 54 BPM | BODY MASS INDEX: 30.19 KG/M2 | DIASTOLIC BLOOD PRESSURE: 66 MMHG | SYSTOLIC BLOOD PRESSURE: 159 MMHG | WEIGHT: 138 LBS

## 2020-11-09 DIAGNOSIS — I10 ESSENTIAL HYPERTENSION: Primary | ICD-10-CM

## 2020-11-09 DIAGNOSIS — R73.09 ELEVATED GLUCOSE: ICD-10-CM

## 2020-11-09 DIAGNOSIS — E78.5 HYPERLIPIDEMIA, UNSPECIFIED HYPERLIPIDEMIA TYPE: ICD-10-CM

## 2020-11-09 DIAGNOSIS — I10 ESSENTIAL HYPERTENSION: ICD-10-CM

## 2020-11-09 DIAGNOSIS — M48.00 SPINAL STENOSIS, UNSPECIFIED SPINAL REGION: ICD-10-CM

## 2020-11-09 PROCEDURE — 82607 VITAMIN B-12: CPT

## 2020-11-09 PROCEDURE — 99214 OFFICE O/P EST MOD 30 MIN: CPT | Performed by: INTERNAL MEDICINE

## 2020-11-09 PROCEDURE — 36415 COLL VENOUS BLD VENIPUNCTURE: CPT

## 2020-11-09 PROCEDURE — 83036 HEMOGLOBIN GLYCOSYLATED A1C: CPT

## 2020-11-09 PROCEDURE — 3008F BODY MASS INDEX DOCD: CPT | Performed by: INTERNAL MEDICINE

## 2020-11-09 PROCEDURE — 85025 COMPLETE CBC W/AUTO DIFF WBC: CPT

## 2020-11-09 PROCEDURE — 3078F DIAST BP <80 MM HG: CPT | Performed by: INTERNAL MEDICINE

## 2020-11-09 PROCEDURE — 80053 COMPREHEN METABOLIC PANEL: CPT

## 2020-11-09 PROCEDURE — 84443 ASSAY THYROID STIM HORMONE: CPT

## 2020-11-09 PROCEDURE — 3077F SYST BP >= 140 MM HG: CPT | Performed by: INTERNAL MEDICINE

## 2020-11-09 PROCEDURE — 80061 LIPID PANEL: CPT

## 2020-11-09 RX ORDER — HYDROCHLOROTHIAZIDE 25 MG/1
25 TABLET ORAL DAILY
Qty: 90 TABLET | Refills: 1 | Status: SHIPPED | OUTPATIENT
Start: 2020-11-09

## 2020-11-09 RX ORDER — AMLODIPINE BESYLATE 10 MG/1
10 TABLET ORAL
Qty: 90 TABLET | Refills: 1 | Status: SHIPPED | OUTPATIENT
Start: 2020-11-09 | End: 2021-06-08

## 2020-11-09 RX ORDER — ALPRAZOLAM 0.25 MG/1
0.25 TABLET ORAL 3 TIMES DAILY PRN
Qty: 60 TABLET | Refills: 0 | Status: SHIPPED | OUTPATIENT
Start: 2020-11-09

## 2020-11-09 RX ORDER — LOSARTAN POTASSIUM 100 MG/1
100 TABLET ORAL DAILY
Qty: 90 TABLET | Refills: 1 | Status: SHIPPED | OUTPATIENT
Start: 2020-11-09 | End: 2021-06-08

## 2020-11-09 NOTE — PROGRESS NOTES
HPI:    Patient ID: Cordelia Cogan is a 76year old female. HPI  Patient is here for follow-up on chronic medical issues. We saw her back 2 months ago prior to cataract surgery.   Her vision has not really improved as much as she thought that History   Problem Relation Age of Onset   • Diabetes Mother    • Glaucoma Neg    • Macular degeneration Neg       Social History    Tobacco Use      Smoking status: Former Smoker        Packs/day: 1.00        Years: 9.00        Pack years: 34 [facility] (three) times daily as needed. 60 tablet 3   • Clopidogrel Bisulfate 75 MG Oral Tab Take 75 mg by mouth daily.      • Butalbital-APAP-Caffeine -40 MG Oral Tab TK 1 T PO Q 4 H PRF HEADACHES     • BYSTOLIC 20 MG Oral Tab Take 2 tablets (40 mg total) by distal pulses. Edema not present. Pulmonary/Chest: Effort normal and breath sounds normal. She has no wheezes. She has no rales. Abdominal: Soft. Bowel sounds are normal. She exhibits no mass. There is no abdominal tenderness.  Musculoskeletal: She e amLODIPine Besylate 10 MG Oral Tab 90 tablet 1     Sig: Take 1 tablet (10 mg total) by mouth once daily. • ALPRAZolam 0.25 MG Oral Tab 60 tablet 3     Sig: Take 1 tablet (0.25 mg total) by mouth 3 (three) times daily as needed.        Imaging & Referrals:

## 2020-11-17 ENCOUNTER — OFFICE VISIT (OUTPATIENT)
Dept: NEUROLOGY | Facility: CLINIC | Age: 75
End: 2020-11-17
Payer: MEDICARE

## 2020-11-17 VITALS — WEIGHT: 138 LBS | BODY MASS INDEX: 31.05 KG/M2 | HEIGHT: 56 IN

## 2020-11-17 DIAGNOSIS — R41.840 CONCENTRATION DEFICIT: ICD-10-CM

## 2020-11-17 DIAGNOSIS — I69.30 CHRONIC ISCHEMIC RIGHT MCA STROKE: Primary | ICD-10-CM

## 2020-11-17 PROCEDURE — 3008F BODY MASS INDEX DOCD: CPT | Performed by: OTHER

## 2020-11-17 PROCEDURE — 99214 OFFICE O/P EST MOD 30 MIN: CPT | Performed by: OTHER

## 2020-11-17 NOTE — PROGRESS NOTES
HPI:    Patient ID: Michael Garcia is a 76year old female. PCP: Dr Tomasz Melendez    Thank you for requesting this consultation to us.   Below is the summary of my evaluation  HPI     Patient is a 17-year-old female with history of hypertension, hyper Problem Relation Age of Onset   • Diabetes Mother    • Glaucoma Neg    • Macular degeneration Neg       Social History    Tobacco Use      Smoking status: Former Smoker        Packs/day: 1.00        Years: 9.00        Pack years: 5        Quit date: 1/1/ mouth daily. 180 tablet 1   • clotrimazole-betamethasone 1-0.05 % External Cream Apply 1 Application topically 2 (two) times daily as needed.  15 g 3   • Mometasone Furoate (NASONEX) 50 MCG/ACT Nasal Suspension 2 spray(s)     • VENTOLIN  (90 Base) MC reactive to light  and accommodation bilaterally. Extraocular muscle intact. Visual fields intact by confrontation  V: Normal facial sensation   VII: Face is symmetric with normal strength. VIII: Normal hearing bilaterally.    IX, X: Symmetric palate el mildly     thickened. Trivial regurgitation. 4. Right ventricle: The TAPSE is normal, suggestive of normal RV systolic     function. 5. Atrial septum: The septum is normal. Color doppler shows no obvious     shunt.   6. Tricuspid valve: Mild regurgitation ordered in this encounter       Imaging & Referrals:  None       YA#[id number]

## 2020-11-17 NOTE — PATIENT INSTRUCTIONS
Use Eye drops: Systane or Blink for dry eye    Continue Melatonin 5 mg at night time      Counseled and educated again on secondary Stroke Prevention. 1.Maintain blood pressure less than 140/90 ideally near 120/80.     2.Maintain LDL less than 100,idea

## 2020-11-24 ENCOUNTER — TELEPHONE (OUTPATIENT)
Dept: PHYSICAL THERAPY | Age: 75
End: 2020-11-24

## 2020-11-30 ENCOUNTER — OFFICE VISIT (OUTPATIENT)
Dept: INTERNAL MEDICINE CLINIC | Facility: CLINIC | Age: 75
End: 2020-11-30
Payer: MEDICARE

## 2020-11-30 VITALS
DIASTOLIC BLOOD PRESSURE: 75 MMHG | SYSTOLIC BLOOD PRESSURE: 149 MMHG | HEART RATE: 58 BPM | BODY MASS INDEX: 31.05 KG/M2 | WEIGHT: 138 LBS | HEIGHT: 56 IN

## 2020-11-30 DIAGNOSIS — Z20.822 COVID-19 RULED OUT: Primary | ICD-10-CM

## 2020-11-30 PROBLEM — B02.9 SHINGLES: Status: ACTIVE | Noted: 2020-11-30

## 2020-11-30 PROCEDURE — 99214 OFFICE O/P EST MOD 30 MIN: CPT | Performed by: NURSE PRACTITIONER

## 2020-11-30 PROCEDURE — 3078F DIAST BP <80 MM HG: CPT | Performed by: NURSE PRACTITIONER

## 2020-11-30 PROCEDURE — 3077F SYST BP >= 140 MM HG: CPT | Performed by: NURSE PRACTITIONER

## 2020-11-30 PROCEDURE — 3008F BODY MASS INDEX DOCD: CPT | Performed by: NURSE PRACTITIONER

## 2020-11-30 RX ORDER — VALACYCLOVIR HYDROCHLORIDE 1 G/1
1 TABLET, FILM COATED ORAL EVERY 8 HOURS
Qty: 21 TABLET | Refills: 0 | Status: SHIPPED | OUTPATIENT
Start: 2020-11-30 | End: 2020-12-07

## 2020-11-30 RX ORDER — TRAMADOL HYDROCHLORIDE 50 MG/1
50 TABLET ORAL EVERY 6 HOURS PRN
Qty: 30 TABLET | Refills: 0 | Status: SHIPPED | OUTPATIENT
Start: 2020-11-30 | End: 2021-11-01

## 2020-11-30 NOTE — ASSESSMENT & PLAN NOTE
Shingles-76year-old female who started with back pain on Thanksgiving evening.   She has a diffuse maculopapular rash with several clear vesicles on her chest. It wraps around from her back to her chest. The affected area occurs in segments innervated by t

## 2020-11-30 NOTE — PROGRESS NOTES
HPI:    Patient ID: Cordelia Cogan is a 76year old female. HPI Shingles   Rash (pt presents today for rash under right breast that goes onto her back.  pt stts she has 6/10 pain)    Shingles  [id number] female who started with back Review of Systems   Constitutional: Positive for chills. Negative for fatigue and fever. HENT: Negative for ear pain and hearing loss. Eyes: Negative for visual disturbance. Respiratory: Negative for cough and shortness of breath.     Cardiovascular: • atorvastatin 40 MG Oral Tab Take 1 tablet (40 mg total) by mouth nightly. 90 tablet 1   • Clopidogrel Bisulfate 75 MG Oral Tab Take 75 mg by mouth daily.      • Butalbital-APAP-Caffeine -40 MG Oral Tab TK 1 T PO Q 4 H PRF HEADACHES     • BYSTOLIC 20 Pulmonary/Chest: Effort normal and breath sounds normal. She has no wheezes. She has no rales. Abdominal: Soft. Bowel sounds are normal. She exhibits no mass. There is no abdominal tenderness. There is no guarding.  Musculoskeletal: She exhibits no tender

## 2020-12-02 ENCOUNTER — APPOINTMENT (OUTPATIENT)
Dept: PHYSICAL THERAPY | Age: 75
End: 2020-12-02
Attending: NURSE PRACTITIONER
Payer: MEDICARE

## 2020-12-07 ENCOUNTER — APPOINTMENT (OUTPATIENT)
Dept: PHYSICAL THERAPY | Age: 75
End: 2020-12-07
Attending: NURSE PRACTITIONER
Payer: MEDICARE

## 2020-12-07 ENCOUNTER — TELEPHONE (OUTPATIENT)
Dept: PHYSICAL THERAPY | Age: 75
End: 2020-12-07

## 2020-12-08 ENCOUNTER — TELEPHONE (OUTPATIENT)
Dept: PHYSICAL THERAPY | Facility: HOSPITAL | Age: 75
End: 2020-12-08

## 2020-12-08 ENCOUNTER — TELEPHONE (OUTPATIENT)
Dept: INTERNAL MEDICINE CLINIC | Facility: CLINIC | Age: 75
End: 2020-12-08

## 2020-12-08 NOTE — TELEPHONE ENCOUNTER
The patient will be going on a trip and needs to attend physical therapy. The patient is asking how long do the Shingles last?    She stated she has 2 scabbed over areas. Instructed is contagious as long as she has open areas.   She also asked when s

## 2020-12-09 ENCOUNTER — APPOINTMENT (OUTPATIENT)
Dept: PHYSICAL THERAPY | Age: 75
End: 2020-12-09
Attending: NURSE PRACTITIONER
Payer: MEDICARE

## 2020-12-11 NOTE — TELEPHONE ENCOUNTER
Patient asked if she could get shingles vaccine while she has shingles. Advised she should not get vaccinated as it may take longer to overcome shingles    Patient also had further questions about shingles that were answered.

## 2020-12-15 ENCOUNTER — APPOINTMENT (OUTPATIENT)
Dept: PHYSICAL THERAPY | Age: 75
End: 2020-12-15
Attending: NURSE PRACTITIONER
Payer: MEDICARE

## 2020-12-16 ENCOUNTER — MED REC SCAN ONLY (OUTPATIENT)
Dept: INTERNAL MEDICINE CLINIC | Facility: CLINIC | Age: 75
End: 2020-12-16

## 2020-12-16 ENCOUNTER — OFFICE VISIT (OUTPATIENT)
Dept: INTERNAL MEDICINE CLINIC | Facility: CLINIC | Age: 75
End: 2020-12-16
Payer: MEDICARE

## 2020-12-16 VITALS
RESPIRATION RATE: 18 BRPM | WEIGHT: 135 LBS | HEIGHT: 56 IN | DIASTOLIC BLOOD PRESSURE: 77 MMHG | HEART RATE: 53 BPM | SYSTOLIC BLOOD PRESSURE: 150 MMHG | BODY MASS INDEX: 30.37 KG/M2

## 2020-12-16 DIAGNOSIS — R10.10 PAIN OF UPPER ABDOMEN: ICD-10-CM

## 2020-12-16 DIAGNOSIS — B02.9 HERPES ZOSTER WITHOUT COMPLICATION: Primary | ICD-10-CM

## 2020-12-16 PROCEDURE — 3008F BODY MASS INDEX DOCD: CPT | Performed by: INTERNAL MEDICINE

## 2020-12-16 PROCEDURE — 3077F SYST BP >= 140 MM HG: CPT | Performed by: INTERNAL MEDICINE

## 2020-12-16 PROCEDURE — 3078F DIAST BP <80 MM HG: CPT | Performed by: INTERNAL MEDICINE

## 2020-12-16 PROCEDURE — 99213 OFFICE O/P EST LOW 20 MIN: CPT | Performed by: INTERNAL MEDICINE

## 2020-12-16 RX ORDER — GABAPENTIN 100 MG/1
100 CAPSULE ORAL 3 TIMES DAILY
Qty: 90 CAPSULE | Refills: 1 | Status: SHIPPED | OUTPATIENT
Start: 2020-12-16 | End: 2021-03-30

## 2020-12-16 NOTE — PROGRESS NOTES
HPI:    Patient ID: Glenn Ann is a 76year old female. HPI  Patient is here for ongoing pain and possible follow-up on the shingles. She was seen here on November 30 by nurse practitioner.   Diagnosed with shingles and placed on valacycl Gastrointestinal: Negative for nausea, vomiting and diarrhea. Genitourinary: Negative for hematuria and difficulty urinating.             Current Outpatient Medications   Medication Sig Dispense Refill   • traMADol HCl 50 MG Oral Tab Take 1 tablet (50 m Nasal route as needed for congestion. • Multiple Vitamins-Minerals (MULTI-VITAMIN/MINERALS) Oral Tab Take 1 tablet by mouth daily.      • prednisoLONE acetate 1 % Ophthalmic Suspension Start one drop, 4 times a day to both eyes (Patient not taking: Tammie requested or ordered in this encounter       Imaging & Referrals:  None         Robel Raygoza MD

## 2020-12-17 ENCOUNTER — APPOINTMENT (OUTPATIENT)
Dept: PHYSICAL THERAPY | Age: 75
End: 2020-12-17
Attending: NURSE PRACTITIONER
Payer: MEDICARE

## 2020-12-29 ENCOUNTER — APPOINTMENT (OUTPATIENT)
Dept: PHYSICAL THERAPY | Age: 75
End: 2020-12-29
Attending: NURSE PRACTITIONER
Payer: MEDICARE

## 2020-12-30 ENCOUNTER — TELEMEDICINE (OUTPATIENT)
Dept: INTERNAL MEDICINE CLINIC | Facility: CLINIC | Age: 75
End: 2020-12-30
Payer: MEDICARE

## 2020-12-30 ENCOUNTER — NURSE TRIAGE (OUTPATIENT)
Dept: INTERNAL MEDICINE CLINIC | Facility: CLINIC | Age: 75
End: 2020-12-30

## 2020-12-30 DIAGNOSIS — H57.89 RED EYE: Primary | ICD-10-CM

## 2020-12-30 DIAGNOSIS — H57.89 SCLERAL INJECTION: ICD-10-CM

## 2020-12-30 PROCEDURE — 99442 PHONE E/M BY PHYS 11-20 MIN: CPT | Performed by: INTERNAL MEDICINE

## 2020-12-30 NOTE — TELEPHONE ENCOUNTER
Action Requested: Summary for Provider     []  Critical Lab, Recommendations Needed  [] Need Additional Advice  []   FYI    []   Need Orders  [] Need Medications Sent to Pharmacy  []  Other     SUMMARY: Pt scheduled virtual visit for today with Dr Bhakti Slaughter.  I

## 2020-12-30 NOTE — PROGRESS NOTES
Patient ID: Maria D Sloan is a 76year old female. Patient presents with:  Conjunctivitis       Virtual/Telephone Check-In    Maria D Sloan verbally consents to a Virtual/Telephone Check-In service on 12/30/20.  Patient understands ALPRAZolam 0.25 MG Oral Tab, Take 1 tablet (0.25 mg total) by mouth 3 (three) times daily as needed. , Disp: 60 tablet, Rfl: 0  •  prednisoLONE acetate 1 % Ophthalmic Suspension, Start one drop, 4 times a day to both eyes (Patient not taking: Reported on 12 Socioeconomic History      Marital status: Legally       Spouse name: Not on file      Number of children: Not on file      Years of education: Not on file      Highest education level: Not on file    Occupational History      Not on file    Ann Yes          Active        Seat Belt: Not Asked        Self-Exams: Not Asked    Social History Narrative      The patient does not use an assistive device. .        The patient does live in a home with stairs.       PHYSICAL EXAM:   Unable to perform vitals

## 2020-12-30 NOTE — PATIENT INSTRUCTIONS
Subconjunctival Hemorrhage    A subconjunctival hemorrhage is a result of a broken blood vessel in the white part of the eye. It is usually painless. It may be caused by coughing, sneezing, or vomiting. An injury to the eye can cause this condition, too.

## 2020-12-31 ENCOUNTER — APPOINTMENT (OUTPATIENT)
Dept: PHYSICAL THERAPY | Age: 75
End: 2020-12-31
Attending: NURSE PRACTITIONER
Payer: MEDICARE

## 2021-01-03 ENCOUNTER — HOSPITAL ENCOUNTER (OUTPATIENT)
Dept: ULTRASOUND IMAGING | Age: 76
Discharge: HOME | End: 2021-01-03
Attending: INTERNAL MEDICINE
Payer: MEDICARE

## 2021-01-03 DIAGNOSIS — R10.10 PAIN OF UPPER ABDOMEN: ICD-10-CM

## 2021-01-03 PROCEDURE — 76705 ECHO EXAM OF ABDOMEN: CPT | Performed by: INTERNAL MEDICINE

## 2021-01-04 ENCOUNTER — APPOINTMENT (OUTPATIENT)
Dept: PHYSICAL THERAPY | Age: 76
End: 2021-01-04
Attending: NURSE PRACTITIONER
Payer: MEDICARE

## 2021-01-05 ENCOUNTER — OFFICE VISIT (OUTPATIENT)
Dept: INTERNAL MEDICINE CLINIC | Facility: CLINIC | Age: 76
End: 2021-01-05
Payer: MEDICARE

## 2021-01-05 VITALS
DIASTOLIC BLOOD PRESSURE: 68 MMHG | BODY MASS INDEX: 29.25 KG/M2 | SYSTOLIC BLOOD PRESSURE: 144 MMHG | HEART RATE: 55 BPM | RESPIRATION RATE: 18 BRPM | WEIGHT: 130 LBS | HEIGHT: 56 IN

## 2021-01-05 DIAGNOSIS — H11.32 CONJUNCTIVAL HEMORRHAGE OF LEFT EYE: Primary | ICD-10-CM

## 2021-01-05 PROCEDURE — 99213 OFFICE O/P EST LOW 20 MIN: CPT | Performed by: INTERNAL MEDICINE

## 2021-01-05 PROCEDURE — 3077F SYST BP >= 140 MM HG: CPT | Performed by: INTERNAL MEDICINE

## 2021-01-05 PROCEDURE — 3078F DIAST BP <80 MM HG: CPT | Performed by: INTERNAL MEDICINE

## 2021-01-05 PROCEDURE — 3008F BODY MASS INDEX DOCD: CPT | Performed by: INTERNAL MEDICINE

## 2021-01-05 NOTE — PATIENT INSTRUCTIONS
Your left eye redness should soon improve and resolve. Follow-up with Dr. Marium Quinones for chronic medical conditions.

## 2021-01-05 NOTE — PROGRESS NOTES
Stephan Su is a 76year old female. Patient presents with:  Eye Problem: f/u red eye, left eye x3-4 days    HPI:   Since Tuesday, 7 days ago, she has had persistent redness of her left eye. No associated symptoms such as pain or itchiness. as needed.  15 g 3   • Mometasone Furoate (NASONEX) 50 MCG/ACT Nasal Suspension 2 spray(s)     • VENTOLIN  (90 Base) MCG/ACT Inhalation Aero Soln INHALE 2 PUFFS INTO THE LUNGS EVERY 6 HOURS AS NEEDED FOR WHEEZING OR COUGH 18 g 1   • Albuterol Sulfate AND PLAN:   1. Conjunctival hemorrhage of left eye  Benign nature of condition discussed with patient. Simply recommend observation. Follow-up with usual PCP. The patient indicates understanding of these issues and agrees to the plan.   The patient i

## 2021-01-07 ENCOUNTER — TELEPHONE (OUTPATIENT)
Dept: INTERNAL MEDICINE CLINIC | Facility: CLINIC | Age: 76
End: 2021-01-07

## 2021-01-07 NOTE — TELEPHONE ENCOUNTER
Patient called back. Will document in result notes. Triage support: patient would like a copy mailed to home. US ABD dated 1/3/21.

## 2021-01-07 NOTE — TELEPHONE ENCOUNTER
Please contact the patient. The ultrasound shows gallstones. This may be contributing to the patient's pain but it is not entirely clear about that.   There are also cysts in the liver but this is not an unusual finding and typically does not mean anythin

## 2021-01-11 ENCOUNTER — TELEPHONE (OUTPATIENT)
Dept: CASE MANAGEMENT | Age: 76
End: 2021-01-11

## 2021-01-11 DIAGNOSIS — K80.20 CALCULUS OF GALLBLADDER WITHOUT CHOLECYSTITIS WITHOUT OBSTRUCTION: Primary | ICD-10-CM

## 2021-01-11 NOTE — TELEPHONE ENCOUNTER
Patient called and stated that she is scheduled to see Dr. Jf Sandoval on 1/13/2020. She was calling to see if her referral was placed and approved by her insurance. Please advise.

## 2021-01-11 NOTE — TELEPHONE ENCOUNTER
Dr. Maria E Gonzalez, patient is requesting a referral to Dr. Mona Ordaz. Referral has been pended, please advise.

## 2021-01-12 ENCOUNTER — TELEPHONE (OUTPATIENT)
Dept: INTERNAL MEDICINE CLINIC | Facility: CLINIC | Age: 76
End: 2021-01-12

## 2021-01-12 DIAGNOSIS — K80.10 CALCULUS OF GALLBLADDER WITH CHOLECYSTITIS WITHOUT BILIARY OBSTRUCTION, UNSPECIFIED CHOLECYSTITIS ACUITY: Primary | ICD-10-CM

## 2021-01-12 NOTE — TELEPHONE ENCOUNTER
Advised patient of Dr. Webb Child note. Patient verbalized understanding. Also called managed care and spoke to Domi Tejada since referral is not showing up on their end. Domi Tejada will put the referral through-did look up and Dr Ervin Rivas is in patient plan. She says she takes alternative japanese herbs for CVA   Refused ASA

## 2021-01-13 ENCOUNTER — OFFICE VISIT (OUTPATIENT)
Dept: SURGERY | Facility: CLINIC | Age: 76
End: 2021-01-13
Payer: MEDICARE

## 2021-01-13 VITALS — WEIGHT: 130 LBS | BODY MASS INDEX: 29.25 KG/M2 | HEIGHT: 56 IN

## 2021-01-13 DIAGNOSIS — K81.1 CHRONIC CHOLECYSTITIS: Primary | ICD-10-CM

## 2021-01-13 PROCEDURE — 3008F BODY MASS INDEX DOCD: CPT | Performed by: SURGERY

## 2021-01-13 PROCEDURE — 99204 OFFICE O/P NEW MOD 45 MIN: CPT | Performed by: SURGERY

## 2021-01-14 NOTE — H&P
History and Physical      Robbert Gosselin is a 76year old female. HPI   Patient presents with:  Gallbladder: Pt referred by Dr. Freeman Or regarding gallstones. Pt c/o intermittent  mid upper abdomal pain that radiates to back area x 6 wks. 100 MG Oral Tab Take 1 tablet (100 mg total) by mouth daily. 90 tablet 1   • hydrochlorothiazide 25 MG Oral Tab Take 1 tablet (25 mg total) by mouth daily. 90 tablet 1   • amLODIPine Besylate 10 MG Oral Tab Take 1 tablet (10 mg total) by mouth once daily. History    Socioeconomic History      Marital status: Legally       Spouse name: Not on file      Number of children: 4      Years of education: Not on file      Highest education level: Not on file    Occupational History      Occupation: [employer] veins and main portal vein are patent with appropriately directed flow. GALLBLADDER:  There are multiple mobile gallstones. No gallbladder wall thickening or pericholecystic fluid. There is no sonographic Decker sign.   BILE DUCTS:    Normal.  Common bile

## 2021-01-18 NOTE — TELEPHONE ENCOUNTER
•  BYSTOLIC 20 MG Oral Tab, Take 2 tablets (40 mg total) by mouth daily. , Disp: 180 tablet, Rfl: 1  •  atorvastatin 40 MG Oral Tab, Take 1 tablet (40 mg total) by mouth nightly., Disp: 90 tablet, Rfl: 1

## 2021-01-19 RX ORDER — ATORVASTATIN CALCIUM 40 MG/1
40 TABLET, FILM COATED ORAL NIGHTLY
Qty: 90 TABLET | Refills: 1 | Status: SHIPPED | OUTPATIENT
Start: 2021-01-19 | End: 2021-06-08

## 2021-01-19 RX ORDER — NEBIVOLOL HYDROCHLORIDE 20 MG/1
40 TABLET ORAL DAILY
Qty: 180 TABLET | Refills: 1 | Status: SHIPPED | OUTPATIENT
Start: 2021-01-19 | End: 2021-06-08

## 2021-01-26 ENCOUNTER — OFFICE VISIT (OUTPATIENT)
Dept: OBGYN CLINIC | Facility: CLINIC | Age: 76
End: 2021-01-26
Payer: MEDICARE

## 2021-01-26 VITALS — WEIGHT: 130 LBS | BODY MASS INDEX: 29 KG/M2 | SYSTOLIC BLOOD PRESSURE: 122 MMHG | DIASTOLIC BLOOD PRESSURE: 76 MMHG

## 2021-01-26 DIAGNOSIS — N81.4 UTERINE PROLAPSE: Primary | ICD-10-CM

## 2021-01-26 PROCEDURE — 57160 INSERT PESSARY/OTHER DEVICE: CPT | Performed by: OBSTETRICS & GYNECOLOGY

## 2021-01-26 PROCEDURE — 3074F SYST BP LT 130 MM HG: CPT | Performed by: OBSTETRICS & GYNECOLOGY

## 2021-01-26 PROCEDURE — 3078F DIAST BP <80 MM HG: CPT | Performed by: OBSTETRICS & GYNECOLOGY

## 2021-02-03 ENCOUNTER — TELEPHONE (OUTPATIENT)
Dept: INTERNAL MEDICINE CLINIC | Facility: CLINIC | Age: 76
End: 2021-02-03

## 2021-02-03 DIAGNOSIS — Z23 NEED FOR VACCINATION: ICD-10-CM

## 2021-02-03 NOTE — TELEPHONE ENCOUNTER
Verified name and . Patient calling to request how she will be notified of when she can receive the COVID-19 vaccine. She states that she has no computer or access to internet.  She was advised that the [facility] is assessing patients by hi

## 2021-03-11 ENCOUNTER — NURSE TRIAGE (OUTPATIENT)
Dept: INTERNAL MEDICINE CLINIC | Facility: CLINIC | Age: 76
End: 2021-03-11

## 2021-03-11 NOTE — TELEPHONE ENCOUNTER
Action Requested: Summary for Provider     []  Critical Lab, Recommendations Needed  [] Need Additional Advice  [x]   FYI    []   Need Orders  [] Need Medications Sent to Pharmacy  []  Other     SUMMARY: Patient reports she received second Shingles vaccine

## 2021-03-23 ENCOUNTER — NURSE TRIAGE (OUTPATIENT)
Dept: INTERNAL MEDICINE CLINIC | Facility: CLINIC | Age: 76
End: 2021-03-23

## 2021-03-23 NOTE — TELEPHONE ENCOUNTER
Action Requested: Summary for Provider     []  Critical Lab, Recommendations Needed  [] Need Additional Advice  []   FYI    []   Need Orders  [] Need Medications Sent to Pharmacy  []  Other     SUMMARY: Patient reports after receiving second Covid vaccine

## 2021-04-12 ENCOUNTER — OFFICE VISIT (OUTPATIENT)
Dept: INTERNAL MEDICINE CLINIC | Facility: CLINIC | Age: 76
End: 2021-04-12
Payer: MEDICARE

## 2021-04-12 VITALS
RESPIRATION RATE: 18 BRPM | HEIGHT: 56 IN | BODY MASS INDEX: 30.37 KG/M2 | DIASTOLIC BLOOD PRESSURE: 58 MMHG | HEART RATE: 54 BPM | SYSTOLIC BLOOD PRESSURE: 144 MMHG | WEIGHT: 135 LBS

## 2021-04-12 DIAGNOSIS — M47.812 FACET ARTHRITIS OF CERVICAL REGION: ICD-10-CM

## 2021-04-12 DIAGNOSIS — M48.00 SPINAL STENOSIS, UNSPECIFIED SPINAL REGION: ICD-10-CM

## 2021-04-12 DIAGNOSIS — I83.90 VARICOSE VEINS OF LOWER EXTREMITY, UNSPECIFIED LATERALITY, UNSPECIFIED WHETHER COMPLICATED: ICD-10-CM

## 2021-04-12 DIAGNOSIS — J30.9 ALLERGIC RHINITIS, UNSPECIFIED SEASONALITY, UNSPECIFIED TRIGGER: ICD-10-CM

## 2021-04-12 DIAGNOSIS — Z12.31 ENCOUNTER FOR SCREENING MAMMOGRAM FOR BREAST CANCER: ICD-10-CM

## 2021-04-12 DIAGNOSIS — Z00.00 ENCOUNTER FOR ANNUAL HEALTH EXAMINATION: Primary | ICD-10-CM

## 2021-04-12 DIAGNOSIS — K80.10 CALCULUS OF GALLBLADDER WITH CHOLECYSTITIS WITHOUT BILIARY OBSTRUCTION, UNSPECIFIED CHOLECYSTITIS ACUITY: ICD-10-CM

## 2021-04-12 DIAGNOSIS — H43.819 VITREOUS DETACHMENT, UNSPECIFIED LATERALITY: ICD-10-CM

## 2021-04-12 DIAGNOSIS — I10 ESSENTIAL HYPERTENSION: ICD-10-CM

## 2021-04-12 DIAGNOSIS — I73.9 PERIPHERAL VASCULAR DISEASE (HCC): ICD-10-CM

## 2021-04-12 DIAGNOSIS — N18.4 CHRONIC KIDNEY DISEASE, STAGE 4 (SEVERE) (HCC): ICD-10-CM

## 2021-04-12 DIAGNOSIS — E78.5 HYPERLIPIDEMIA, UNSPECIFIED HYPERLIPIDEMIA TYPE: ICD-10-CM

## 2021-04-12 PROCEDURE — 3078F DIAST BP <80 MM HG: CPT | Performed by: INTERNAL MEDICINE

## 2021-04-12 PROCEDURE — 3008F BODY MASS INDEX DOCD: CPT | Performed by: INTERNAL MEDICINE

## 2021-04-12 PROCEDURE — G0439 PPPS, SUBSEQ VISIT: HCPCS | Performed by: INTERNAL MEDICINE

## 2021-04-12 PROCEDURE — 96160 PT-FOCUSED HLTH RISK ASSMT: CPT | Performed by: INTERNAL MEDICINE

## 2021-04-12 PROCEDURE — 99397 PER PM REEVAL EST PAT 65+ YR: CPT | Performed by: INTERNAL MEDICINE

## 2021-04-12 PROCEDURE — 3077F SYST BP >= 140 MM HG: CPT | Performed by: INTERNAL MEDICINE

## 2021-04-12 NOTE — PATIENT INSTRUCTIONS
Chidi Ortiz's SCREENING SCHEDULE   Tests on this list are recommended by your physician but may not be covered, or covered at this frequency, by your insurer. Please check with your insurance carrier before scheduling to verify coverage. every 10 years- more often if abnormal Colonoscopy due on 09/01/2019 Update [facility] if applicable    Flex Sigmoidoscopy Screen  Covered every 5 years No results found for this or any previous visit. No flowsheet data found.      Fecal Occult Bloo Pneumococcal 23 (Pneumovax)  Covered Once after 65 No orders found for this or any previous visit. Please get once after your 65th birthday    Hepatitis B for Moderate/High Risk       No orders found for this or any previous visit.  Medium/high risk factors

## 2021-04-12 NOTE — PROGRESS NOTES
HPI:   Rory Conti is a 76year old female who presents for a MA (Medicare Advantage) Supervisit (Once per calendar year). Patient is here requesting Medicare advantage annual health assessment visit.   We last saw her in the office Dece possible depression. Saw eye doctor last Dec for vitreous detachment.          Annual Physical due on 04/12/2022        Fall/Risk Assessment   She has been screened for Falls and is low risk: Fall/Risk Scoring: 3    Cognitive Assessment   She had a complete right     Hyperopia with astigmatism and presbyopia     Hyperlipidemia     Cervical radiculopathy     CKD (chronic kidney disease) stage 4, GFR 15-29 ml/min ([facility])     Uterine prolapse     PAD (peripheral artery disease) ([facility])     Facet arthritis of cervica one drop, 4 times a day to both eyes  Ergocalciferol (VITAMIN D OR), Take by mouth. TRIAMCINOLONE ACETONIDE 0.1 % External Ointment, APPLY TOPICALLY TO THE AFFECTED AREA TWICE DAILY  Clopidogrel Bisulfate 75 MG Oral Tab, Take 75 mg by mouth daily.   Barbara She  reports that she quit smoking about 38 years ago. She has a 9.00 pack-year smoking history. She has never used smokeless tobacco. She reports that she does not drink alcohol and does not use drugs.      REVIEW OF SYSTEMS:   Review of Systems       EX Acuity: Uncorrected Left Eye Chart Acuity: 20/40   Both Eyes Visual Acuity: Uncorrected Both Eyes Chart Acuity: 20/40   Able To Tolerate Visual Acuity: Yes      Physical Exam  Constitutional:       Appearance: She is well-developed.    HENT:      Right Ear: examination  Physical exam is unremarkable. Patient is doing reasonably well given her numerous medical issues. Health maintenance issues reviewed. Advanced directives reviewed. Encourage diet and exercise as tolerated. Follow-up in 6 months.     2. Es without trying?: 2 - No  Has your appetite been poor?: No  How does the patient maintain a good energy level?: Other (NONE)  How would you describe your daily physical activity?: Light  How would you describe your current health state?: Fair  How do you ma preventive care reminders to display for this patient.  Update Health Maintenance if applicable     Immunizations (Update Immunization Activity if applicable)     Influenza  Covered Annually No vaccine history found Please get every year    Pneumococcal 13

## 2021-04-15 ENCOUNTER — TELEPHONE (OUTPATIENT)
Dept: INTERNAL MEDICINE CLINIC | Facility: CLINIC | Age: 76
End: 2021-04-15

## 2021-04-15 DIAGNOSIS — I83.90 VARICOSE VEINS OF LOWER EXTREMITY, UNSPECIFIED LATERALITY, UNSPECIFIED WHETHER COMPLICATED: Primary | ICD-10-CM

## 2021-04-15 NOTE — TELEPHONE ENCOUNTER
08-Nov-2020 14:15 Patient requesting referral for Dr. Jackeline Bejarano, vascular surgeon. Patient has appt scheduled for a consultation due to varicose veins. Requesting to add codes 94988, [id number] to referral and fax to Dr Jackeline Bejarano office. Pended below please advise.     Dr Sanders See 08-Nov-2020 14:16

## 2021-04-16 NOTE — TELEPHONE ENCOUNTER
Referral placed. Please advise patient to wait until she gets approval from managed care. Referral will only be approved if this physician is an in network provider. If he is out of network or not a covered benefit, then the patient may be redirected.

## 2021-04-22 ENCOUNTER — TELEPHONE (OUTPATIENT)
Dept: INTERNAL MEDICINE CLINIC | Facility: CLINIC | Age: 76
End: 2021-04-22

## 2021-04-22 NOTE — TELEPHONE ENCOUNTER
Please instruct patient to call the number on back of his card to find the proper provider    QUINCY Couch  Working with Roel Gibson

## 2021-04-22 NOTE — TELEPHONE ENCOUNTER
Hello,    Please note that Dr. Morris Larson is not within patient's network. Please  Re-direct patient to an in network provider. [facility] providers are within patient's network. Thank you, Hernando Ontiveros Specialist    Managed Care.

## 2021-04-23 NOTE — TELEPHONE ENCOUNTER
Referral  Referral # [id number]  Referral Notes  Number of Notes: 2  .   Type Date User Summary Attachment    04/22/2021  3:24 PM Aurora Clemons - -   Note    Message below sent to ordering doctor.      Hello,     Please note that Dr. Corinne Nick is no

## 2021-04-24 DIAGNOSIS — I73.9 PAD (PERIPHERAL ARTERY DISEASE) (HCC): Primary | ICD-10-CM

## 2021-04-24 NOTE — TELEPHONE ENCOUNTER
Please refer to Aaron Moreland. Please check with manage care to make sure this  Is within her network. Order signed.     QUINCY Grace  Working with Marichuy Quintero

## 2021-04-26 NOTE — TELEPHONE ENCOUNTER
See referral encounter 04/22/2021. Type Date User Summary Attachment    04/22/2021  3:24 PM Aurora Clemons - -   Note    Message below sent to ordering doctor.      Hello,     Please note that Dr. Beulah Becker is not within patient's network.  Owen

## 2021-04-26 NOTE — TELEPHONE ENCOUNTER
Please reach out to pt to advise of a in network provider. Received a call from Dr. Carole Ruggiero office stating that pt is scheduled for procedure.

## 2021-04-27 NOTE — TELEPHONE ENCOUNTER
Please call patient and advise her that we cannot approve the referral because the doctor is not in network. She needs to be re-directed to an in network provider.  If she has the procedure with the out of network doctor, she will need to pay for it out of

## 2021-04-28 ENCOUNTER — NURSE ONLY (OUTPATIENT)
Dept: ALLERGY | Facility: CLINIC | Age: 76
End: 2021-04-28
Payer: MEDICARE

## 2021-04-28 ENCOUNTER — OFFICE VISIT (OUTPATIENT)
Dept: ALLERGY | Facility: CLINIC | Age: 76
End: 2021-04-28
Payer: MEDICARE

## 2021-04-28 VITALS — HEIGHT: 56 IN | HEART RATE: 55 BPM | WEIGHT: 134 LBS | BODY MASS INDEX: 30.14 KG/M2

## 2021-04-28 DIAGNOSIS — L20.9 ATOPIC DERMATITIS, UNSPECIFIED TYPE: ICD-10-CM

## 2021-04-28 DIAGNOSIS — Z23 NEED FOR VACCINATION AGAINST STREPTOCOCCUS PNEUMONIAE USING PNEUMOCOCCAL CONJUGATE VACCINE 13: ICD-10-CM

## 2021-04-28 DIAGNOSIS — J30.89 PERENNIAL ALLERGIC RHINITIS: ICD-10-CM

## 2021-04-28 DIAGNOSIS — H10.13 ALLERGIC CONJUNCTIVITIS OF BOTH EYES: ICD-10-CM

## 2021-04-28 DIAGNOSIS — J30.89 PERENNIAL ALLERGIC RHINITIS: Primary | ICD-10-CM

## 2021-04-28 PROCEDURE — 90670 PCV13 VACCINE IM: CPT | Performed by: ALLERGY & IMMUNOLOGY

## 2021-04-28 PROCEDURE — 95004 PERQ TESTS W/ALRGNC XTRCS: CPT | Performed by: ALLERGY & IMMUNOLOGY

## 2021-04-28 PROCEDURE — 99204 OFFICE O/P NEW MOD 45 MIN: CPT | Performed by: ALLERGY & IMMUNOLOGY

## 2021-04-28 PROCEDURE — 3008F BODY MASS INDEX DOCD: CPT | Performed by: ALLERGY & IMMUNOLOGY

## 2021-04-28 PROCEDURE — G0009 ADMIN PNEUMOCOCCAL VACCINE: HCPCS | Performed by: ALLERGY & IMMUNOLOGY

## 2021-04-28 RX ORDER — LEVOCETIRIZINE DIHYDROCHLORIDE 5 MG/1
TABLET, FILM COATED ORAL
Qty: 90 TABLET | Refills: 0 | Status: SHIPPED | OUTPATIENT
Start: 2021-04-28 | End: 2021-06-15

## 2021-04-28 RX ORDER — LEVOCETIRIZINE DIHYDROCHLORIDE 5 MG/1
5 TABLET, FILM COATED ORAL NIGHTLY
Qty: 30 TABLET | Refills: 0 | Status: SHIPPED | OUTPATIENT
Start: 2021-04-28 | End: 2021-04-28

## 2021-04-28 NOTE — TELEPHONE ENCOUNTER
Refill requested for    Name from pharmacy: LEVOCETIRIZINE 5MG TABLETS         Will file in chart as: LEVOCETIRIZINE DIHYDROCHLORIDE 5 MG Oral Tab    Sig: TAKE 1 TABLET(5 MG) BY MOUTH EVERY NIGHT    Disp:  90 tablet    Refills:  0 (Pharmacy requested: Not

## 2021-04-28 NOTE — TELEPHONE ENCOUNTER
Spoke to patient, informed of message. Patient stated she will call her insurance for more information.

## 2021-04-28 NOTE — PROGRESS NOTES
Pb Caballero is a 76year old female. HPI:   Patient presents with:   Allergies: pt in for allergies, having all year around, takes zyrtec otc, eyes very itchy        Patient is a 27-year-old female who presents for allergy evaluation with a Father    • Glaucoma Neg    • Macular degeneration Neg       Social History: Social History    Tobacco Use      Smoking status: Former Smoker        Packs/day: 1.00        Years: 9.00        Pack years: 5        Quit date: 1/1/1983        Years since [facility] (NASONEX) 50 MCG/ACT Nasal Suspension 2 spray(s)     • VENTOLIN  (90 Base) MCG/ACT Inhalation Aero Soln INHALE 2 PUFFS INTO THE LUNGS EVERY 6 HOURS AS NEEDED FOR WHEEZING OR COUGH 18 g 1   • Albuterol Sulfate HFA (VENTOLIN HFA) 108 (90 Base) MCG/ACT and lids are normal extraocular motion is intact   Ears/Audiometry: tympanic membranes are normal bilaterally hearing is grossly intact  Nose/Mouth/Throat: nose and throat are clear mucous membranes are moist   Neck/Thyroid: neck is supple without adenopat spent on time care and visit       Orders This Visit:  Orders Placed This Encounter      PNEUMOCOCCAL VACC, 13 REBEKAH IM      Meds This Visit:  Requested Prescriptions     Signed Prescriptions Disp Refills   • Levocetirizine Dihydrochloride (XYZAL) 5 MG Oral

## 2021-04-28 NOTE — PATIENT INSTRUCTIONS
1. Ar  Handouts on allergies and avoidance measures provided and reviewed including the potential treatment options immunotherapy  Trial of Xyzal, levocetirizine 5 mg once a night at bedtime in place of Zyrtec  May continue with Flonase 2 sprays per Artem Eric

## 2021-04-30 ENCOUNTER — TELEPHONE (OUTPATIENT)
Dept: CASE MANAGEMENT | Age: 76
End: 2021-04-30

## 2021-04-30 DIAGNOSIS — I83.93 VARICOSE VEINS OF BOTH LOWER EXTREMITIES, UNSPECIFIED WHETHER COMPLICATED: Primary | ICD-10-CM

## 2021-04-30 NOTE — TELEPHONE ENCOUNTER
Please call the patient back. I need more information. What kind of physician is this Dr. Stanislaw Pierre? Why does she need to see him? Has she seen him before?   Also please advise the patient that just because her agent tells her that this is an in [employer]

## 2021-04-30 NOTE — TELEPHONE ENCOUNTER
Spoke to patient and Jc Franco is a vascular surgeon for her varicose veins. There are previous encounters stating she saw Roverto Carroll and wasn't satisfied with him and would like to see someone else. Please see encounters from 4/15/21 and 4/22/21.

## 2021-04-30 NOTE — TELEPHONE ENCOUNTER
Pt is requesting to get a referral for a Dr. Kavitha Pace. Pt states that her agent told her this is a in network provider.

## 2021-05-11 NOTE — TELEPHONE ENCOUNTER
Mychart message sent to [facility], referral is still on OPEN status.

## 2021-05-20 NOTE — TELEPHONE ENCOUNTER
Referral  Referral # [id number]  Referral Notes  Number of Notes: 6  . Type Date User Summary Attachment    05/20/2021  8:54 AM Eric Guthrie - -   Note    Pt advised and given phone and address to Dr. Marina Sarah office.

## 2021-05-25 ENCOUNTER — OFFICE VISIT (OUTPATIENT)
Dept: INTERNAL MEDICINE CLINIC | Facility: CLINIC | Age: 76
End: 2021-05-25
Payer: MEDICARE

## 2021-05-25 VITALS
BODY MASS INDEX: 31.27 KG/M2 | WEIGHT: 139 LBS | DIASTOLIC BLOOD PRESSURE: 65 MMHG | HEART RATE: 55 BPM | HEIGHT: 56 IN | SYSTOLIC BLOOD PRESSURE: 150 MMHG

## 2021-05-25 DIAGNOSIS — B00.9 HSV-1 (HERPES SIMPLEX VIRUS 1) INFECTION: Primary | ICD-10-CM

## 2021-05-25 PROCEDURE — 3008F BODY MASS INDEX DOCD: CPT | Performed by: NURSE PRACTITIONER

## 2021-05-25 PROCEDURE — 99214 OFFICE O/P EST MOD 30 MIN: CPT | Performed by: NURSE PRACTITIONER

## 2021-05-25 PROCEDURE — 3077F SYST BP >= 140 MM HG: CPT | Performed by: NURSE PRACTITIONER

## 2021-05-25 PROCEDURE — 3078F DIAST BP <80 MM HG: CPT | Performed by: NURSE PRACTITIONER

## 2021-05-25 RX ORDER — VALACYCLOVIR HYDROCHLORIDE 1 G/1
1 TABLET, FILM COATED ORAL EVERY 8 HOURS
Qty: 21 TABLET | Refills: 0 | Status: SHIPPED | OUTPATIENT
Start: 2021-05-25 | End: 2021-06-01

## 2021-05-25 NOTE — ASSESSMENT & PLAN NOTE
A/P 45-year-old female with complaints of oral ulcer on bottom lip along the vermillion border. She tried putting Abreva on and Vaseline. Patient denies any fever, chills, or feeling ill.   Patient started with pain radiating on the left side of her face t

## 2021-05-25 NOTE — ASSESSMENT & PLAN NOTE
A/P patient complains of pain from varicose veins at night. She will be seeing a Dr. [facility] [facility] toward penile and [facility] for vein restoration.

## 2021-05-25 NOTE — PROGRESS NOTES
HPI:    Patient ID: Sherly Blake is a 76year old female. HPI Lower lip shingles  Patient developed pain in her lower lip with burning. Lower leg varicose veins  Patient is suppose to have a treatment for vein restoration in [place].   since quittin.4      Smokeless tobacco: Never Used    Vaping Use      Vaping Use: Never used    Substance and Sexual Activity      Alcohol use: No        Alcohol/week: 0.0 standard drinks      Drug use: No      Sexual activity: Not Currently        Pa AFFECTED AREA TWICE DAILY 454 g 0   • Clopidogrel Bisulfate 75 MG Oral Tab Take 75 mg by mouth daily.      • Butalbital-APAP-Caffeine -40 MG Oral Tab TK 1 T PO Q 4 H PRF HEADACHES     • clotrimazole-betamethasone 1-0.05 % External Cream Apply 1 Applic days    Patient to return within 2 weeks for follow-up and examination                      No follow-ups on file. No orders of the defined types were placed in this encounter.       Meds This Visit:  Requested Prescriptions     Signed Prescriptions Disp

## 2021-06-08 ENCOUNTER — OFFICE VISIT (OUTPATIENT)
Dept: INTERNAL MEDICINE CLINIC | Facility: CLINIC | Age: 76
End: 2021-06-08
Payer: MEDICARE

## 2021-06-08 ENCOUNTER — TELEPHONE (OUTPATIENT)
Dept: INTERNAL MEDICINE CLINIC | Facility: CLINIC | Age: 76
End: 2021-06-08

## 2021-06-08 VITALS
DIASTOLIC BLOOD PRESSURE: 66 MMHG | BODY MASS INDEX: 31.05 KG/M2 | HEIGHT: 56 IN | HEART RATE: 53 BPM | WEIGHT: 138 LBS | SYSTOLIC BLOOD PRESSURE: 151 MMHG

## 2021-06-08 DIAGNOSIS — Z13.820 SCREENING FOR OSTEOPOROSIS: Primary | ICD-10-CM

## 2021-06-08 DIAGNOSIS — Z13.1 SCREENING FOR DIABETES MELLITUS: ICD-10-CM

## 2021-06-08 DIAGNOSIS — R73.03 PRE-DIABETES: ICD-10-CM

## 2021-06-08 PROCEDURE — 99214 OFFICE O/P EST MOD 30 MIN: CPT | Performed by: NURSE PRACTITIONER

## 2021-06-08 PROCEDURE — 3077F SYST BP >= 140 MM HG: CPT | Performed by: NURSE PRACTITIONER

## 2021-06-08 PROCEDURE — 3008F BODY MASS INDEX DOCD: CPT | Performed by: NURSE PRACTITIONER

## 2021-06-08 PROCEDURE — 3078F DIAST BP <80 MM HG: CPT | Performed by: NURSE PRACTITIONER

## 2021-06-08 PROCEDURE — 83036 HEMOGLOBIN GLYCOSYLATED A1C: CPT | Performed by: NURSE PRACTITIONER

## 2021-06-08 RX ORDER — LOSARTAN POTASSIUM 100 MG/1
100 TABLET ORAL DAILY
Qty: 90 TABLET | Refills: 1 | Status: SHIPPED | OUTPATIENT
Start: 2021-06-08 | End: 2021-06-26

## 2021-06-08 RX ORDER — NEBIVOLOL HYDROCHLORIDE 20 MG/1
40 TABLET ORAL DAILY
Qty: 180 TABLET | Refills: 1 | Status: SHIPPED | OUTPATIENT
Start: 2021-06-08 | End: 2021-12-31

## 2021-06-08 RX ORDER — AMLODIPINE BESYLATE 10 MG/1
10 TABLET ORAL
Qty: 90 TABLET | Refills: 1 | Status: SHIPPED | OUTPATIENT
Start: 2021-06-08 | End: 2021-06-15

## 2021-06-08 RX ORDER — ATORVASTATIN CALCIUM 40 MG/1
40 TABLET, FILM COATED ORAL NIGHTLY
Qty: 90 TABLET | Refills: 1 | Status: SHIPPED | OUTPATIENT
Start: 2021-06-08 | End: 2021-11-06

## 2021-06-08 NOTE — ASSESSMENT & PLAN NOTE
A/P patient like to be screened for diabetes. Plan  Hemoglobin A1C done in the office 5.5.  Patient called with results

## 2021-06-08 NOTE — TELEPHONE ENCOUNTER
Patient had to leave for dentist appointment and could not wait for results. Hemoglobin A1C 5.5 and patient was called with results. Very appreciative.     Keyla Jha, ANP

## 2021-06-08 NOTE — PROGRESS NOTES
HPI:    Patient ID: Gilberto Garcia is a 76year old female. 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 She is concerned about her blood sugars since hse has been craving sweets. Patient appointment was at 10 A. M. and she scheduled a 11 A. M. dental Pack years: 5        Quit date: 1983        Years since quittin.4      Smokeless tobacco: Never Used    Vaping Use      Vaping Use: Never used    Substance and Sexual Activity      Alcohol use: No        Alcohol/week: 0.0 standard drinks      Drug (40 mg total) by mouth nightly. 90 tablet 1   • BYSTOLIC 20 MG Oral Tab Take 2 tablets (40 mg total) by mouth daily. 180 tablet 1   • traMADol HCl 50 MG Oral Tab Take 1 tablet (50 mg total) by mouth every 6 (six) hours as needed for Pain.  30 tablet 0   • l ITCHING   PHYSICAL EXAM:   Physical Exam  Vitals and nursing note reviewed. Constitutional:       Appearance: Normal appearance. She is well-developed. HENT:      Head: Normocephalic.       Right Ear: Tympanic membrane normal.      Left Ear: Tympani Readings from Last 2 Encounters:  06/08/21 : 138 lb (62.6 kg)  05/25/21 : 139 lb (63 kg)    Body mass index is 30.94 kg/m². (2)           ASSESSMENT/PLAN:     Problem List Items Addressed This Visit        Unprioritized    Screening for osteoporosis - [facility]

## 2021-06-15 RX ORDER — AMLODIPINE BESYLATE 10 MG/1
TABLET ORAL
Qty: 90 TABLET | Refills: 1 | Status: SHIPPED | OUTPATIENT
Start: 2021-06-15

## 2021-06-15 RX ORDER — LEVOCETIRIZINE DIHYDROCHLORIDE 5 MG/1
5 TABLET, FILM COATED ORAL NIGHTLY
Qty: 90 TABLET | Refills: 1 | Status: SHIPPED | OUTPATIENT
Start: 2021-06-15

## 2021-06-15 RX ORDER — CELECOXIB 200 MG/1
200 CAPSULE ORAL 2 TIMES DAILY
Qty: 180 CAPSULE | Refills: 1 | Status: SHIPPED | OUTPATIENT
Start: 2021-06-15

## 2021-06-15 RX ORDER — GABAPENTIN 100 MG/1
CAPSULE ORAL
Qty: 270 CAPSULE | Refills: 1 | Status: SHIPPED | OUTPATIENT
Start: 2021-06-15

## 2021-06-15 RX ORDER — DULOXETIN HYDROCHLORIDE 30 MG/1
30 CAPSULE, DELAYED RELEASE ORAL DAILY
Qty: 90 CAPSULE | Refills: 1 | Status: SHIPPED | OUTPATIENT
Start: 2021-06-15

## 2021-06-15 NOTE — TELEPHONE ENCOUNTER
Eneida from [address] calling to request refill request for Duloxetine HCL,Celocoxib,Gabapentin, and Levocetirizine. Refills pended for review.

## 2021-06-21 RX ORDER — CLOPIDOGREL BISULFATE 75 MG/1
75 TABLET ORAL DAILY
Qty: 90 TABLET | Refills: 1 | Status: SHIPPED | OUTPATIENT
Start: 2021-06-21 | End: 2021-12-12

## 2021-06-25 NOTE — TELEPHONE ENCOUNTER
Please review; protocol failed.   Requested Prescriptions     Pending Prescriptions Disp Refills   • LOSARTAN 100 MG Oral Tab [Pharmacy Med Name: LOSARTAN 100MG TABLETS] 90 tablet 1     Sig: TAKE 1 TABLET(100 MG) BY MOUTH DAILY       Medication Dispense His Written Strength Quantity Refills Days Supply Provider Pharmacy   gabapentin 100 mg capsule 06/16/2021  100  capsule  [address]    GABAPENTIN 100MG CAPSULES 03/01/2021 03/01/2021  90 each  [address] [place] [address] 03/08/2021 11/09/2020  90 each  [facility] #. ..   amlodipine 10 mg tablet 03/08/2021  10 MG 90 tablet  [facility] ..         hydroCHLOROthiazide     Dispensed Written Strength Quantity Refills D

## 2021-06-25 NOTE — TELEPHONE ENCOUNTER
Requested Prescriptions   Pending Prescriptions Disp Refills   • LOSARTAN 100 MG Oral Tab [Pharmacy Med Name: LOSARTAN 100MG TABLETS] 90 tablet 1     Sig: TAKE 1 TABLET(100 MG) BY MOUTH DAILY       Hypertensive Medications Protocol Failed - 6/25/2021  3:

## 2021-06-26 RX ORDER — LOSARTAN POTASSIUM 100 MG/1
TABLET ORAL
Qty: 90 TABLET | Refills: 1 | Status: SHIPPED | OUTPATIENT
Start: 2021-06-26

## 2021-07-26 ENCOUNTER — HOSPITAL ENCOUNTER (OUTPATIENT)
Dept: MAMMOGRAPHY | Age: 76
Discharge: HOME | End: 2021-07-26
Attending: INTERNAL MEDICINE
Payer: MEDICARE

## 2021-07-26 DIAGNOSIS — Z12.31 ENCOUNTER FOR SCREENING MAMMOGRAM FOR BREAST CANCER: ICD-10-CM

## 2021-07-26 PROCEDURE — 77063 BREAST TOMOSYNTHESIS BI: CPT | Performed by: INTERNAL MEDICINE

## 2021-07-26 PROCEDURE — 77067 SCR MAMMO BI INCL CAD: CPT | Performed by: INTERNAL MEDICINE

## 2021-11-01 NOTE — TELEPHONE ENCOUNTER
Patient is requesting a refill on the following medication.     Medication Detail    Medication Quantity Refills Start End   traMADol HCl 50 MG Oral Tab 30 tablet 0 11/30/2020    Sig:   Take 1 tablet (50 mg total) by mouth every 6 (six) hours as needed for

## 2021-11-01 NOTE — TELEPHONE ENCOUNTER
Please review; protocol failed/no protocol    Requested Prescriptions   Pending Prescriptions Disp Refills    traMADol 50 MG Oral Tab 30 tablet 0     Sig: Take 1 tablet (50 mg total) by mouth every 6 (six) hours as needed for Pain.         There is no refil

## 2021-11-02 RX ORDER — TRAMADOL HYDROCHLORIDE 50 MG/1
50 TABLET ORAL EVERY 6 HOURS PRN
Qty: 30 TABLET | Refills: 0 | Status: SHIPPED | OUTPATIENT
Start: 2021-11-02

## 2021-11-06 RX ORDER — ATORVASTATIN CALCIUM 40 MG/1
40 TABLET, FILM COATED ORAL NIGHTLY
Qty: 90 TABLET | Refills: 1 | Status: SHIPPED | OUTPATIENT
Start: 2021-11-06 | End: 2022-07-25

## 2021-11-06 NOTE — TELEPHONE ENCOUNTER
[facility]. Requested Prescriptions   Pending Prescriptions Disp Refills    ATORVASTATIN 40 MG Oral Tab [Pharmacy Med Name: ATORVASTATIN CALCIUM 40 MG Tablet] 90 tablet 1     Sig: Take 1 tablet (40 mg total) by mouth nightly.         Cholesterol Medication Protocol Passed - 11/5/2021  5:05 PM        Passed - ALT in past 12 months        Passed - LDL in past 12 months        Passed - Last ALT < 80       Lab Results   Component Value Date    ALT 22 11/09/2020             Passed - Last LDL < 130     Lab Results   Component Value Date    LDL 56 11/09/2020               Passed - Appointment in past 12 or next 3 months                Recent Outpatient Visits              4 months ago Paresthesia of both hands    Neurology - [address], Edgard Yeh MD    Office Visit    4 months ago History of ischemic right MCA stroke    Neurology - Xenia Del Toro MD    Office Visit    5 months ago Screening for osteoporosis    [facility], [address], Tatiana Arnold APRN    Office Visit    5 months ago HSV-1 (herpes simplex virus 1) infection    [facility], [address], Tatiana Arnold APRN    Office Visit    6 months ago Perennial allergic rhinitis    [facility], [address]    Nurse Only

## 2021-12-12 RX ORDER — CLOPIDOGREL BISULFATE 75 MG/1
TABLET ORAL
Qty: 90 TABLET | Refills: 1 | Status: SHIPPED | OUTPATIENT
Start: 2021-12-12

## 2021-12-31 RX ORDER — NEBIVOLOL HYDROCHLORIDE 20 MG/1
40 TABLET ORAL DAILY
Qty: 180 TABLET | Refills: 1 | Status: SHIPPED | OUTPATIENT
Start: 2021-12-31

## 2022-01-18 NOTE — TELEPHONE ENCOUNTER
Please review; protocol failed/no protocol. Requested Prescriptions   Pending Prescriptions Disp Refills    triamcinolone 0.1 % External Ointment 454 g 0     Sig: Apply 1 Application topically 2 (two) times daily.         There is no refill protocol inf
No

## 2022-04-15 RX ORDER — NEBIVOLOL HYDROCHLORIDE 20 MG/1
40 TABLET ORAL DAILY
Qty: 180 TABLET | Refills: 1 | Status: SHIPPED | OUTPATIENT
Start: 2022-04-15 | End: 2022-04-18

## 2022-04-15 NOTE — TELEPHONE ENCOUNTER
Please Review. Protocol Failed or has no protocol. Requested Prescriptions   Pending Prescriptions Disp Refills    BYSTOLIC 20 MG Oral Tab 081 tablet 1     Sig: Take 2 tablets (40 mg total) by mouth daily.         Hypertensive Medications Protocol Failed - 4/15/2022  1:47 PM        Failed - CMP or BMP in past 12 months        Failed - Appointment in past 6 or next 3 months        Failed - GFR Non- > 50     Lab Results   Component Value Date    GFRNAA 27 (L) 11/09/2020                     Recent Outpatient Visits              9 months ago Paresthesia of both hands    Neurology - [address], Edgard Yeh MD    Office Visit    9 months ago History of ischemic right MCA stroke    Neurology - Xenia Del Toro MD    Office Visit    10 months ago Screening for osteoporosis    [facility], [address], [place], RUFINO    Office Visit    10 months ago HSV-1 (herpes simplex virus 1) infection    [facility], [address], [place], APRMANUEL    Office Visit    11 months ago Perennial allergic rhinitis    [facility], [facility], [address]    Nurse Only

## 2022-04-17 RX ORDER — HYDROCHLOROTHIAZIDE 25 MG/1
TABLET ORAL
Qty: 90 TABLET | Refills: 0 | Status: SHIPPED | OUTPATIENT
Start: 2022-04-17 | End: 2022-10-17

## 2022-04-18 ENCOUNTER — TELEPHONE (OUTPATIENT)
Dept: INTERNAL MEDICINE CLINIC | Facility: CLINIC | Age: 77
End: 2022-04-18

## 2022-04-18 ENCOUNTER — TELEPHONE (OUTPATIENT)
Dept: OTHER | Age: 77
End: 2022-04-18

## 2022-04-18 ENCOUNTER — TELEPHONE (OUTPATIENT)
Dept: ALLERGY | Facility: CLINIC | Age: 77
End: 2022-04-18

## 2022-04-18 RX ORDER — NEBIVOLOL HYDROCHLORIDE 20 MG/1
40 TABLET ORAL DAILY
Qty: 180 TABLET | Refills: 1 | Status: SHIPPED | OUTPATIENT
Start: 2022-04-18 | End: 2022-07-25

## 2022-04-18 NOTE — TELEPHONE ENCOUNTER
Pt scheduled for a follow up on May 11th to see Dr. Dimas Lay, and receive the pneumovax 23 vaccine.

## 2022-04-18 NOTE — TELEPHONE ENCOUNTER
[facility] Prior [facility]ization Form has been filed out and faxed to [facility]  It can take 1-5 business days for it to come back.

## 2022-04-19 NOTE — TELEPHONE ENCOUNTER
Please change Bystolic to either preferred:  Metoprolol Tart, Metoprolol Succ or Carvedilol    Bystolic not covered.

## 2022-04-20 NOTE — TELEPHONE ENCOUNTER
Please call pt and discuss change in medication. She may need to change the med or pay for it herself.  Has she been on metoprolol or carvedilol in the past

## 2022-04-21 ENCOUNTER — OFFICE VISIT (OUTPATIENT)
Dept: INTERNAL MEDICINE CLINIC | Facility: CLINIC | Age: 77
End: 2022-04-21
Payer: MEDICARE

## 2022-04-21 ENCOUNTER — LAB ENCOUNTER (OUTPATIENT)
Dept: LAB | Facility: HOSPITAL | Age: 77
End: 2022-04-21
Attending: NURSE PRACTITIONER
Payer: MEDICARE

## 2022-04-21 VITALS
SYSTOLIC BLOOD PRESSURE: 164 MMHG | HEART RATE: 56 BPM | DIASTOLIC BLOOD PRESSURE: 82 MMHG | WEIGHT: 139.38 LBS | HEIGHT: 56 IN | TEMPERATURE: 98 F | BODY MASS INDEX: 31.35 KG/M2

## 2022-04-21 DIAGNOSIS — E78.5 HYPERLIPIDEMIA, UNSPECIFIED HYPERLIPIDEMIA TYPE: ICD-10-CM

## 2022-04-21 DIAGNOSIS — Z00.00 ANNUAL PHYSICAL EXAM: Primary | ICD-10-CM

## 2022-04-21 DIAGNOSIS — I10 ESSENTIAL HYPERTENSION: ICD-10-CM

## 2022-04-21 DIAGNOSIS — Z12.31 ENCOUNTER FOR SCREENING MAMMOGRAM FOR MALIGNANT NEOPLASM OF BREAST: ICD-10-CM

## 2022-04-21 DIAGNOSIS — Z13.820 SCREENING FOR OSTEOPOROSIS: ICD-10-CM

## 2022-04-21 DIAGNOSIS — Z00.00 ANNUAL PHYSICAL EXAM: ICD-10-CM

## 2022-04-21 DIAGNOSIS — H66.91 OTITIS OF RIGHT EAR: ICD-10-CM

## 2022-04-21 LAB
ALBUMIN SERPL-MCNC: 3.9 G/DL (ref 3.4–5)
ALBUMIN/GLOB SERPL: 1.1 {RATIO} (ref 1–2)
ALP LIVER SERPL-CCNC: 84 U/L
ALT SERPL-CCNC: 21 U/L
ANION GAP SERPL CALC-SCNC: 6 MMOL/L (ref 0–18)
AST SERPL-CCNC: 16 U/L (ref 15–37)
BASOPHILS # BLD AUTO: 0.08 X10(3) UL (ref 0–0.2)
BASOPHILS NFR BLD AUTO: 0.9 %
BILIRUB SERPL-MCNC: 0.9 MG/DL (ref 0.1–2)
BILIRUB UR QL: NEGATIVE
BUN BLD-MCNC: 22 MG/DL (ref 7–18)
BUN/CREAT SERPL: 13.4 (ref 10–20)
CALCIUM BLD-MCNC: 9.5 MG/DL (ref 8.5–10.1)
CHLORIDE SERPL-SCNC: 108 MMOL/L (ref 98–112)
CHOLEST SERPL-MCNC: 150 MG/DL (ref ?–200)
CO2 SERPL-SCNC: 30 MMOL/L (ref 21–32)
COLOR UR: YELLOW
CREAT BLD-MCNC: 1.64 MG/DL
DEPRECATED RDW RBC AUTO: 46.8 FL (ref 35.1–46.3)
EOSINOPHIL # BLD AUTO: 0.28 X10(3) UL (ref 0–0.7)
EOSINOPHIL NFR BLD AUTO: 3.2 %
ERYTHROCYTE [DISTWIDTH] IN BLOOD BY AUTOMATED COUNT: 13.5 % (ref 11–15)
FASTING PATIENT LIPID ANSWER: YES
FASTING STATUS PATIENT QL REPORTED: YES
GLOBULIN PLAS-MCNC: 3.6 G/DL (ref 2.8–4.4)
GLUCOSE BLD-MCNC: 92 MG/DL (ref 70–99)
GLUCOSE UR-MCNC: NEGATIVE MG/DL
HCT VFR BLD AUTO: 39.4 %
HDLC SERPL-MCNC: 60 MG/DL (ref 40–59)
HGB BLD-MCNC: 12.4 G/DL
IMM GRANULOCYTES # BLD AUTO: 0.03 X10(3) UL (ref 0–1)
IMM GRANULOCYTES NFR BLD: 0.3 %
KETONES UR-MCNC: NEGATIVE MG/DL
LDLC SERPL CALC-MCNC: 71 MG/DL (ref ?–100)
LYMPHOCYTES # BLD AUTO: 1.02 X10(3) UL (ref 1–4)
LYMPHOCYTES NFR BLD AUTO: 11.8 %
MCH RBC QN AUTO: 29.7 PG (ref 26–34)
MCHC RBC AUTO-ENTMCNC: 31.5 G/DL (ref 31–37)
MCV RBC AUTO: 94.5 FL
MONOCYTES # BLD AUTO: 0.65 X10(3) UL (ref 0.1–1)
MONOCYTES NFR BLD AUTO: 7.5 %
NEUTROPHILS # BLD AUTO: 6.59 X10 (3) UL (ref 1.5–7.7)
NEUTROPHILS # BLD AUTO: 6.59 X10(3) UL (ref 1.5–7.7)
NEUTROPHILS NFR BLD AUTO: 76.3 %
NITRITE UR QL STRIP.AUTO: NEGATIVE
NONHDLC SERPL-MCNC: 90 MG/DL (ref ?–130)
OSMOLALITY SERPL CALC.SUM OF ELEC: 301 MOSM/KG (ref 275–295)
PH UR: 5 [PH] (ref 5–8)
PLATELET # BLD AUTO: 179 10(3)UL (ref 150–450)
POTASSIUM SERPL-SCNC: 4.2 MMOL/L (ref 3.5–5.1)
PROT SERPL-MCNC: 7.5 G/DL (ref 6.4–8.2)
PROT UR-MCNC: NEGATIVE MG/DL
RBC # BLD AUTO: 4.17 X10(6)UL
SODIUM SERPL-SCNC: 144 MMOL/L (ref 136–145)
SP GR UR STRIP: 1.02 (ref 1–1.03)
TRIGL SERPL-MCNC: 105 MG/DL (ref 30–149)
TSI SER-ACNC: 1.34 MIU/ML (ref 0.36–3.74)
UROBILINOGEN UR STRIP-ACNC: <2
VIT B12 SERPL-MCNC: 914 PG/ML (ref 193–986)
VIT C UR-MCNC: NEGATIVE MG/DL
VIT D+METAB SERPL-MCNC: 35.5 NG/ML (ref 30–100)
VLDLC SERPL CALC-MCNC: 16 MG/DL (ref 0–30)
WBC # BLD AUTO: 8.7 X10(3) UL (ref 4–11)
WBC #/AREA URNS AUTO: >50 /HPF

## 2022-04-21 PROCEDURE — 85025 COMPLETE CBC W/AUTO DIFF WBC: CPT

## 2022-04-21 PROCEDURE — 80061 LIPID PANEL: CPT

## 2022-04-21 PROCEDURE — 81001 URINALYSIS AUTO W/SCOPE: CPT

## 2022-04-21 PROCEDURE — 82306 VITAMIN D 25 HYDROXY: CPT

## 2022-04-21 PROCEDURE — 3008F BODY MASS INDEX DOCD: CPT | Performed by: NURSE PRACTITIONER

## 2022-04-21 PROCEDURE — 36415 COLL VENOUS BLD VENIPUNCTURE: CPT

## 2022-04-21 PROCEDURE — 99214 OFFICE O/P EST MOD 30 MIN: CPT | Performed by: NURSE PRACTITIONER

## 2022-04-21 PROCEDURE — 84443 ASSAY THYROID STIM HORMONE: CPT

## 2022-04-21 PROCEDURE — 3077F SYST BP >= 140 MM HG: CPT | Performed by: NURSE PRACTITIONER

## 2022-04-21 PROCEDURE — 3079F DIAST BP 80-89 MM HG: CPT | Performed by: NURSE PRACTITIONER

## 2022-04-21 PROCEDURE — 82607 VITAMIN B-12: CPT

## 2022-04-21 PROCEDURE — 80053 COMPREHEN METABOLIC PANEL: CPT

## 2022-04-21 RX ORDER — ALBUTEROL SULFATE 90 UG/1
2 AEROSOL, METERED RESPIRATORY (INHALATION) EVERY 6 HOURS PRN
Qty: 18 G | Refills: 1 | Status: SHIPPED | OUTPATIENT
Start: 2022-04-21

## 2022-04-21 RX ORDER — ALBUTEROL SULFATE 90 UG/1
AEROSOL, METERED RESPIRATORY (INHALATION)
Qty: 1 EACH | Refills: 0 | Status: SHIPPED | OUTPATIENT
Start: 2022-04-21

## 2022-04-21 RX ORDER — VALACYCLOVIR HYDROCHLORIDE 1 G/1
1000 TABLET, FILM COATED ORAL EVERY 12 HOURS SCHEDULED
Qty: 8 TABLET | Refills: 3 | Status: SHIPPED | OUTPATIENT
Start: 2022-04-21 | End: 2022-04-22

## 2022-04-21 NOTE — TELEPHONE ENCOUNTER
Called patient using language lines Austine Oppenheim #[id number]  Left message for patient    2nd attempt

## 2022-04-21 NOTE — ASSESSMENT & PLAN NOTE
Patient with some pain and drainage from her right ear. Ear canal erythematous with yellowish fluid around the tympanic membrane. neomycin-polymyxin-hydrocortisone [id number] Otic Solution Place 4 drops in ear(s) 4 (four) times daily.  10 mL

## 2022-04-21 NOTE — ASSESSMENT & PLAN NOTE
04/21/22  1033   BP: (!) 164/82   Pulse:    Temp:    Patient with hypertension and states she has not taken her blood pressure medication in 4 days because she ran out. Plan  Patient instructed on a low-sodium diet  Discussed lifestyle modifications including reductions in dietary total and saturated fat, weight loss, aerobic exercise, and eating a diet rich in fruits and vegetables.   Patient to be compliant with medications  Annual lab ordered

## 2022-04-21 NOTE — ASSESSMENT & PLAN NOTE
Patient has intermittent cold sores and complaims of oral ulcer on bottom lip along the vermillion border. She is requesting to have valtrex available.       Plan  Valtrex script send with refills

## 2022-05-09 ENCOUNTER — LAB ENCOUNTER (OUTPATIENT)
Dept: LAB | Facility: HOSPITAL | Age: 77
End: 2022-05-09
Attending: NURSE PRACTITIONER
Payer: MEDICARE

## 2022-05-09 ENCOUNTER — TELEPHONE (OUTPATIENT)
Dept: CASE MANAGEMENT | Age: 77
End: 2022-05-09

## 2022-05-09 DIAGNOSIS — R35.0 FREQUENCY OF MICTURITION: ICD-10-CM

## 2022-05-09 LAB
BILIRUB UR QL: NEGATIVE
CLARITY UR: CLEAR
COLOR UR: YELLOW
GLUCOSE UR-MCNC: NEGATIVE MG/DL
HGB UR QL STRIP.AUTO: NEGATIVE
HYALINE CASTS #/AREA URNS AUTO: PRESENT /LPF
KETONES UR-MCNC: NEGATIVE MG/DL
NITRITE UR QL STRIP.AUTO: NEGATIVE
PH UR: 5 [PH] (ref 5–8)
PROT UR-MCNC: NEGATIVE MG/DL
SP GR UR STRIP: 1.02 (ref 1–1.03)
UROBILINOGEN UR STRIP-ACNC: <2
VIT C UR-MCNC: 40 MG/DL

## 2022-05-09 PROCEDURE — 81001 URINALYSIS AUTO W/SCOPE: CPT

## 2022-05-09 PROCEDURE — 87086 URINE CULTURE/COLONY COUNT: CPT

## 2022-05-09 NOTE — TELEPHONE ENCOUNTER
Dr. Peng Browning,    The patient called requesting referral to a neurologist for her nerves. Please call patient to let her know who you recommend so she can schedule an appointment. Pended referral please review diagnosis and sign off if you agree. Thank you.   William Max

## 2022-05-10 NOTE — TELEPHONE ENCOUNTER
Please call and discussed with the patient the reasoning for her request to see a neurologist.  I do not know what is meant by needing to see them for her nerves. This is because she is nervous or anxious? Is this because she feels she has a disease involving her nerves? Patient does have a history of spine and back issues.

## 2022-05-11 ENCOUNTER — OFFICE VISIT (OUTPATIENT)
Dept: ALLERGY | Facility: CLINIC | Age: 77
End: 2022-05-11
Payer: MEDICARE

## 2022-05-11 VITALS
HEART RATE: 55 BPM | SYSTOLIC BLOOD PRESSURE: 158 MMHG | WEIGHT: 139.38 LBS | OXYGEN SATURATION: 95 % | DIASTOLIC BLOOD PRESSURE: 66 MMHG | TEMPERATURE: 97 F | BODY MASS INDEX: 31.35 KG/M2 | HEIGHT: 56 IN

## 2022-05-11 DIAGNOSIS — L20.9 ATOPIC DERMATITIS, UNSPECIFIED TYPE: ICD-10-CM

## 2022-05-11 DIAGNOSIS — Z23 NEED FOR VACCINATION AGAINST STREPTOCOCCUS PNEUMONIAE USING PNEUMOCOCCAL CONJUGATE VACCINE 13: ICD-10-CM

## 2022-05-11 DIAGNOSIS — J30.89 PERENNIAL ALLERGIC RHINITIS: Primary | ICD-10-CM

## 2022-05-11 DIAGNOSIS — I83.92 VARICOSE VEINS OF LEFT LOWER EXTREMITY, UNSPECIFIED WHETHER COMPLICATED: ICD-10-CM

## 2022-05-11 DIAGNOSIS — Z92.29 COVID-19 VACCINE SERIES COMPLETED: ICD-10-CM

## 2022-05-11 DIAGNOSIS — H10.13 ALLERGIC CONJUNCTIVITIS OF BOTH EYES: ICD-10-CM

## 2022-05-11 PROCEDURE — G0009 ADMIN PNEUMOCOCCAL VACCINE: HCPCS | Performed by: ALLERGY & IMMUNOLOGY

## 2022-05-11 PROCEDURE — 3008F BODY MASS INDEX DOCD: CPT | Performed by: ALLERGY & IMMUNOLOGY

## 2022-05-11 PROCEDURE — 99214 OFFICE O/P EST MOD 30 MIN: CPT | Performed by: ALLERGY & IMMUNOLOGY

## 2022-05-11 PROCEDURE — 3078F DIAST BP <80 MM HG: CPT | Performed by: ALLERGY & IMMUNOLOGY

## 2022-05-11 PROCEDURE — 90732 PPSV23 VACC 2 YRS+ SUBQ/IM: CPT | Performed by: ALLERGY & IMMUNOLOGY

## 2022-05-11 PROCEDURE — 3077F SYST BP >= 140 MM HG: CPT | Performed by: ALLERGY & IMMUNOLOGY

## 2022-05-11 NOTE — TELEPHONE ENCOUNTER
Called patient using language lines Fred VELAZQUEZW#[id number]  Patient states that she has had a stroke a few years ago. Patient would like a new referral.  She feels electric shock.

## 2022-05-11 NOTE — PATIENT INSTRUCTIONS
#1 allergic rhinitis  Continue with Zyrtec and Flonase. May consider Singulair if refractory. Reviewed avoidance measures and potential treatment options immunotherapy. May consider nasal saline sinus rinses    #2 Pneumovax 23 given in office today. Prevnar 13/1 year ago    #3 COVID vaccines up-to-date. 3 doses today. Recommend fourth dose is indicated for 50 and older. Encouraged fourth dose     #4 varicose veins  Recent worsening. Patient to be seen at [facility] for evaluation and treatment  Elevate legs when not on her feet.   Consider compression stockings      Follow-up in 1 year or sooner if needed

## 2022-05-26 RX ORDER — ALBUTEROL SULFATE 90 UG/1
2 AEROSOL, METERED RESPIRATORY (INHALATION) EVERY 6 HOURS PRN
Qty: 18 G | Refills: 2 | Status: SHIPPED | OUTPATIENT
Start: 2022-05-26

## 2022-05-26 NOTE — TELEPHONE ENCOUNTER
Patient called. She states pharmacy requested albuterol INH several times and our office did not respond. Last RX sent 4/21/22. Not clear why pharmacy didn't get it. Patient needs refill for albuterol INH. She does need it. She has hx of allergies; uses albuterol PRN.

## 2022-05-26 NOTE — TELEPHONE ENCOUNTER
Refill passed per [facility] protocol. Requested Prescriptions   Pending Prescriptions Disp Refills    albuterol 108 (90 Base) MCG/ACT Inhalation Aero Soln 18 g 2     Sig: Inhale 2 puffs into the lungs every 6 (six) hours as needed for Wheezing.         Asthma & COPD Medication Protocol Passed - 5/26/2022  1:17 PM        Passed - Appointment in past 6 or next 3 months               Recent Outpatient Visits              2 weeks ago Perennial allergic rhinitis    [facility], [address], Brandon Morrow MD    Office Visit    1 month ago Annual physical exam    [facility], [address], [place], APRN    Office Visit    10 months ago Paresthesia of both hands    Neurology - Kenai Carlson MD    Office Visit    11 months ago History of ischemic right MCA stroke    Neurology - Kenia Carlson MD    Office Visit    11 months ago Screening for osteoporosis    [facility], [address], Abr[place] Arnold, [employer] Visit            Future Appointments         Provider Department Appt Notes    In 1 week Claudia James MD [facility], [address]  check on progress

## 2022-06-06 ENCOUNTER — OFFICE VISIT (OUTPATIENT)
Dept: OBGYN CLINIC | Facility: CLINIC | Age: 77
End: 2022-06-06
Payer: MEDICARE

## 2022-06-06 VITALS
DIASTOLIC BLOOD PRESSURE: 69 MMHG | SYSTOLIC BLOOD PRESSURE: 124 MMHG | HEART RATE: 48 BPM | WEIGHT: 139.81 LBS | BODY MASS INDEX: 31 KG/M2

## 2022-06-06 DIAGNOSIS — N81.2 INCOMPLETE UTERINE PROLAPSE: Primary | ICD-10-CM

## 2022-06-06 PROCEDURE — 3074F SYST BP LT 130 MM HG: CPT | Performed by: OBSTETRICS & GYNECOLOGY

## 2022-06-06 PROCEDURE — 99213 OFFICE O/P EST LOW 20 MIN: CPT | Performed by: OBSTETRICS & GYNECOLOGY

## 2022-06-06 PROCEDURE — 3078F DIAST BP <80 MM HG: CPT | Performed by: OBSTETRICS & GYNECOLOGY

## 2022-06-06 NOTE — PROCEDURES
Pessary      Consent signed. Procedure discussed with patient in detail including indication, risk, benefits, alternatives and complications. Problems:  None    Procedure:  Pessary Type: Ring with support  Size: 3    Rectocele Grade 0  Cystocele Grade 0  Enterocele Grade 0  Uterine Prolapse Grade 3    Pessary removed, cleansed and replaced with out difficulty after vaginal canal was cleansed with betadine. Patient tolerated the procedure well.     Plan:  Trimosan 2X/week

## 2022-06-17 ENCOUNTER — TELEPHONE (OUTPATIENT)
Dept: INTERNAL MEDICINE CLINIC | Facility: CLINIC | Age: 77
End: 2022-06-17

## 2022-06-17 DIAGNOSIS — I83.893 SYMPTOMATIC VARICOSE VEINS OF BOTH LOWER EXTREMITIES: Primary | ICD-10-CM

## 2022-06-17 NOTE — TELEPHONE ENCOUNTER
Michaela with [facility] is requesting a call back to confirm referral request has been received. Fax was sent today and patient has an appointment scheduled for 6/21.  Please advise

## 2022-06-17 NOTE — TELEPHONE ENCOUNTER
Michaela checking on status of referral, states patient has appt for Tuesday June 21, 2022.     [address] and Fax# [phone]

## 2022-06-17 NOTE — TELEPHONE ENCOUNTER
I will forward to Managed Care. I am not in the office today. I presume that the diagnosis is varicose veins. Question for managed care- Is [facility] in network for this patient?   If they are not in network, pt will need to be redirected of pay out of pocket

## 2022-06-20 NOTE — TELEPHONE ENCOUNTER
I approve of the referral to the vascular surgeon as presented. Unfortunately, I cannot sign off on the order for usual because a flaw in the epic system.

## 2022-06-20 NOTE — TELEPHONE ENCOUNTER
Dr. Nayely Rodriguez,    [id number] [address] [facility] called requesting a referral for Dr. James Urena, vein specialist, for consult for varicose veins. Referral will need to be put through to [facility] to find out if specialist is within network. Pended referral please review diagnosis and sign off if you agree. Thank you.   William Max

## 2022-07-11 RX ORDER — ALBUTEROL SULFATE 90 UG/1
AEROSOL, METERED RESPIRATORY (INHALATION)
Qty: 18 G | Refills: 2 | Status: SHIPPED | OUTPATIENT
Start: 2022-07-11

## 2022-07-25 ENCOUNTER — TELEPHONE (OUTPATIENT)
Dept: INTERNAL MEDICINE CLINIC | Facility: CLINIC | Age: 77
End: 2022-07-25

## 2022-07-25 ENCOUNTER — OFFICE VISIT (OUTPATIENT)
Dept: INTERNAL MEDICINE CLINIC | Facility: CLINIC | Age: 77
End: 2022-07-25
Payer: MEDICARE

## 2022-07-25 VITALS
SYSTOLIC BLOOD PRESSURE: 138 MMHG | DIASTOLIC BLOOD PRESSURE: 75 MMHG | BODY MASS INDEX: 31.94 KG/M2 | WEIGHT: 142 LBS | HEART RATE: 54 BPM | HEIGHT: 56 IN

## 2022-07-25 DIAGNOSIS — N18.4 CKD (CHRONIC KIDNEY DISEASE) STAGE 4, GFR 15-29 ML/MIN (HCC): Primary | ICD-10-CM

## 2022-07-25 DIAGNOSIS — R06.09 DYSPNEA ON EXERTION: ICD-10-CM

## 2022-07-25 PROCEDURE — 3075F SYST BP GE 130 - 139MM HG: CPT | Performed by: NURSE PRACTITIONER

## 2022-07-25 PROCEDURE — 3008F BODY MASS INDEX DOCD: CPT | Performed by: NURSE PRACTITIONER

## 2022-07-25 PROCEDURE — 3078F DIAST BP <80 MM HG: CPT | Performed by: NURSE PRACTITIONER

## 2022-07-25 PROCEDURE — 99214 OFFICE O/P EST MOD 30 MIN: CPT | Performed by: NURSE PRACTITIONER

## 2022-07-25 RX ORDER — VALACYCLOVIR HYDROCHLORIDE 1 G/1
TABLET, FILM COATED ORAL
COMMUNITY
Start: 2020-11-30 | End: 2022-07-25

## 2022-07-25 RX ORDER — AMLODIPINE BESYLATE 10 MG/1
10 TABLET ORAL DAILY
Qty: 90 TABLET | Refills: 1 | Status: SHIPPED | OUTPATIENT
Start: 2022-07-25

## 2022-07-25 RX ORDER — ATORVASTATIN CALCIUM 40 MG/1
40 TABLET, FILM COATED ORAL NIGHTLY
Qty: 90 TABLET | Refills: 1 | Status: SHIPPED | OUTPATIENT
Start: 2022-07-25

## 2022-07-25 RX ORDER — CLOPIDOGREL BISULFATE 75 MG/1
75 TABLET ORAL DAILY
Qty: 90 TABLET | Refills: 1 | Status: SHIPPED | OUTPATIENT
Start: 2022-07-25

## 2022-07-25 RX ORDER — LOSARTAN POTASSIUM 100 MG/1
100 TABLET ORAL DAILY
Qty: 90 TABLET | Refills: 1 | Status: SHIPPED | OUTPATIENT
Start: 2022-07-25

## 2022-07-25 RX ORDER — VALACYCLOVIR HYDROCHLORIDE 1 G/1
1000 TABLET, FILM COATED ORAL AS NEEDED
Qty: 21 TABLET | Refills: 0 | Status: SHIPPED | OUTPATIENT
Start: 2022-07-25

## 2022-07-25 RX ORDER — NEBIVOLOL HYDROCHLORIDE 20 MG/1
40 TABLET ORAL DAILY
Qty: 180 TABLET | Refills: 1 | Status: SHIPPED | OUTPATIENT
Start: 2022-07-25

## 2022-07-25 RX ORDER — TRAMADOL HYDROCHLORIDE 50 MG/1
50 TABLET ORAL EVERY 6 HOURS PRN
Qty: 30 TABLET | Refills: 0 | Status: SHIPPED | OUTPATIENT
Start: 2022-07-25

## 2022-07-25 NOTE — ASSESSMENT & PLAN NOTE
HX of chronic kidney disease. Patient states that she stopped taking Celebrex a while back. She has stage 4 CKD.     Plan  Repeat CMP

## 2022-07-26 ENCOUNTER — LAB ENCOUNTER (OUTPATIENT)
Dept: LAB | Facility: HOSPITAL | Age: 77
End: 2022-07-26
Attending: NURSE PRACTITIONER
Payer: MEDICARE

## 2022-07-26 DIAGNOSIS — N18.4 CKD (CHRONIC KIDNEY DISEASE) STAGE 4, GFR 15-29 ML/MIN (HCC): ICD-10-CM

## 2022-07-26 LAB
ALBUMIN SERPL-MCNC: 3.9 G/DL (ref 3.4–5)
ALBUMIN/GLOB SERPL: 1.1 {RATIO} (ref 1–2)
ALP LIVER SERPL-CCNC: 74 U/L
ALT SERPL-CCNC: 25 U/L
ANION GAP SERPL CALC-SCNC: 9 MMOL/L (ref 0–18)
AST SERPL-CCNC: 14 U/L (ref 15–37)
BILIRUB SERPL-MCNC: 0.9 MG/DL (ref 0.1–2)
BUN BLD-MCNC: 34 MG/DL (ref 7–18)
BUN/CREAT SERPL: 19.8 (ref 10–20)
CALCIUM BLD-MCNC: 9.5 MG/DL (ref 8.5–10.1)
CHLORIDE SERPL-SCNC: 109 MMOL/L (ref 98–112)
CO2 SERPL-SCNC: 25 MMOL/L (ref 21–32)
CREAT BLD-MCNC: 1.72 MG/DL
FASTING STATUS PATIENT QL REPORTED: YES
GLOBULIN PLAS-MCNC: 3.4 G/DL (ref 2.8–4.4)
GLUCOSE BLD-MCNC: 109 MG/DL (ref 70–99)
OSMOLALITY SERPL CALC.SUM OF ELEC: 304 MOSM/KG (ref 275–295)
POTASSIUM SERPL-SCNC: 4.2 MMOL/L (ref 3.5–5.1)
PROT SERPL-MCNC: 7.3 G/DL (ref 6.4–8.2)
SODIUM SERPL-SCNC: 143 MMOL/L (ref 136–145)

## 2022-07-26 PROCEDURE — 80053 COMPREHEN METABOLIC PANEL: CPT

## 2022-07-26 PROCEDURE — 36415 COLL VENOUS BLD VENIPUNCTURE: CPT

## 2022-08-01 ENCOUNTER — HOSPITAL ENCOUNTER (OUTPATIENT)
Dept: CV DIAGNOSTICS | Facility: HOSPITAL | Age: 77
Discharge: HOME | End: 2022-08-01
Attending: NURSE PRACTITIONER
Payer: MEDICARE

## 2022-08-01 DIAGNOSIS — R06.09 DYSPNEA ON EXERTION: ICD-10-CM

## 2022-08-01 PROCEDURE — 93306 TTE W/DOPPLER COMPLETE: CPT | Performed by: NURSE PRACTITIONER

## 2022-08-02 DIAGNOSIS — R06.02 SHORT OF BREATH ON EXERTION: Primary | ICD-10-CM

## 2022-08-03 ENCOUNTER — HOSPITAL ENCOUNTER (OUTPATIENT)
Dept: GENERAL RADIOLOGY | Age: 77
Discharge: HOME | End: 2022-08-03
Attending: NURSE PRACTITIONER
Payer: MEDICARE

## 2022-08-03 DIAGNOSIS — R06.02 SHORT OF BREATH ON EXERTION: ICD-10-CM

## 2022-08-03 PROCEDURE — 71046 X-RAY EXAM CHEST 2 VIEWS: CPT | Performed by: NURSE PRACTITIONER

## 2022-08-06 ENCOUNTER — HOSPITAL ENCOUNTER (OUTPATIENT)
Dept: MAMMOGRAPHY | Age: 77
Discharge: HOME | End: 2022-08-06
Attending: NURSE PRACTITIONER
Payer: MEDICARE

## 2022-08-06 DIAGNOSIS — Z12.31 ENCOUNTER FOR SCREENING MAMMOGRAM FOR MALIGNANT NEOPLASM OF BREAST: ICD-10-CM

## 2022-08-06 PROCEDURE — 77067 SCR MAMMO BI INCL CAD: CPT | Performed by: NURSE PRACTITIONER

## 2022-08-06 PROCEDURE — 77063 BREAST TOMOSYNTHESIS BI: CPT | Performed by: NURSE PRACTITIONER

## 2022-08-08 ENCOUNTER — TELEPHONE (OUTPATIENT)
Dept: INTERNAL MEDICINE CLINIC | Facility: CLINIC | Age: 77
End: 2022-08-08

## 2022-08-08 NOTE — TELEPHONE ENCOUNTER
Spoke to patient. She has a stress test scheduled for tomorrow. She would like to know if she needs to change her medications around or if she needs to hold anything. Cardiology referred her to her PCP. Mariana Powell, please advise.

## 2022-08-09 ENCOUNTER — HOSPITAL ENCOUNTER (OUTPATIENT)
Dept: CV DIAGNOSTICS | Facility: HOSPITAL | Age: 77
Discharge: HOME | End: 2022-08-09
Attending: NURSE PRACTITIONER
Payer: MEDICARE

## 2022-08-09 ENCOUNTER — HOSPITAL ENCOUNTER (OUTPATIENT)
Dept: NUCLEAR MEDICINE | Facility: HOSPITAL | Age: 77
Discharge: HOME | End: 2022-08-09
Attending: NURSE PRACTITIONER
Payer: MEDICARE

## 2022-08-09 ENCOUNTER — APPOINTMENT (OUTPATIENT)
Dept: MAMMOGRAPHY | Age: 77
End: 2022-08-09
Attending: NURSE PRACTITIONER
Payer: MEDICARE

## 2022-08-09 DIAGNOSIS — R06.02 SHORT OF BREATH ON EXERTION: ICD-10-CM

## 2022-08-09 PROCEDURE — 93018 CV STRESS TEST I&R ONLY: CPT | Performed by: NURSE PRACTITIONER

## 2022-08-09 PROCEDURE — 78452 HT MUSCLE IMAGE SPECT MULT: CPT | Performed by: NURSE PRACTITIONER

## 2022-08-09 PROCEDURE — 93017 CV STRESS TEST TRACING ONLY: CPT | Performed by: NURSE PRACTITIONER

## 2022-08-09 PROCEDURE — 93016 CV STRESS TEST SUPVJ ONLY: CPT | Performed by: NURSE PRACTITIONER

## 2022-08-11 DIAGNOSIS — R10.31 RIGHT GROIN PAIN: Primary | ICD-10-CM

## 2022-08-17 NOTE — TELEPHONE ENCOUNTER
Received call from patient requesting a refill. Please review pended script and sign if appropriate.

## 2022-09-19 ENCOUNTER — TELEPHONE (OUTPATIENT)
Dept: INTERNAL MEDICINE CLINIC | Facility: CLINIC | Age: 77
End: 2022-09-19

## 2022-09-19 NOTE — TELEPHONE ENCOUNTER
Patient would like a call to discuss medications,, and possible changes due to insurance coverage,please advise.

## 2022-09-19 NOTE — TELEPHONE ENCOUNTER
Patient called. Her insurance no longer covers [facility]. Other therapy ordered.     QUINCY Granado  Working with Teresa Rose

## 2022-10-03 ENCOUNTER — HOSPITAL ENCOUNTER (OUTPATIENT)
Dept: GENERAL RADIOLOGY | Age: 77
Discharge: HOME | End: 2022-10-03
Attending: NURSE PRACTITIONER
Payer: MEDICARE

## 2022-10-03 DIAGNOSIS — R10.31 RIGHT GROIN PAIN: ICD-10-CM

## 2022-10-03 PROCEDURE — 73502 X-RAY EXAM HIP UNI 2-3 VIEWS: CPT | Performed by: NURSE PRACTITIONER

## 2022-10-17 NOTE — TELEPHONE ENCOUNTER
Patient is requesting refill of medication listed below :    amLODIPine 10 MG Oral Tab  losartan 100 MG Oral Tab  clopidogrel 75 MG Oral Tab  metoprolol succinate ER 50 MG Oral Tablet 24 Hr  HYDROCHLOROTHIAZIDE 25 MG Oral Tab    Pharmacy - [address]

## 2022-10-18 ENCOUNTER — HOSPITAL ENCOUNTER (OUTPATIENT)
Dept: BONE DENSITY | Age: 77
Discharge: HOME | End: 2022-10-18
Attending: NURSE PRACTITIONER
Payer: MEDICARE

## 2022-10-18 DIAGNOSIS — Z13.820 SCREENING FOR OSTEOPOROSIS: ICD-10-CM

## 2022-10-18 LAB — HM DEXA SCAN: ABNORMAL

## 2022-10-18 PROCEDURE — 77080 DXA BONE DENSITY AXIAL: CPT | Performed by: NURSE PRACTITIONER

## 2022-10-18 RX ORDER — HYDROCHLOROTHIAZIDE 25 MG/1
25 TABLET ORAL DAILY
Qty: 90 TABLET | Refills: 1 | Status: SHIPPED | OUTPATIENT
Start: 2022-10-18

## 2022-10-18 NOTE — TELEPHONE ENCOUNTER
Patient calling to request update on refills, stated has not been able to take medication for two days as medication was thrown away by accident. Requesting call back.

## 2022-11-02 ENCOUNTER — OFFICE VISIT (OUTPATIENT)
Dept: INTERNAL MEDICINE CLINIC | Facility: CLINIC | Age: 77
End: 2022-11-02
Payer: MEDICARE

## 2022-11-02 VITALS
SYSTOLIC BLOOD PRESSURE: 170 MMHG | BODY MASS INDEX: 32.17 KG/M2 | WEIGHT: 143 LBS | DIASTOLIC BLOOD PRESSURE: 80 MMHG | RESPIRATION RATE: 18 BRPM | TEMPERATURE: 98 F | HEART RATE: 70 BPM | HEIGHT: 56 IN

## 2022-11-02 DIAGNOSIS — E78.5 HYPERLIPIDEMIA, UNSPECIFIED HYPERLIPIDEMIA TYPE: ICD-10-CM

## 2022-11-02 DIAGNOSIS — I10 ESSENTIAL HYPERTENSION: Primary | ICD-10-CM

## 2022-11-02 DIAGNOSIS — I73.9 PAD (PERIPHERAL ARTERY DISEASE) (HCC): ICD-10-CM

## 2022-11-02 DIAGNOSIS — I83.90 VARICOSE VEINS: ICD-10-CM

## 2022-11-02 DIAGNOSIS — M19.90 OSTEOARTHRITIS, UNSPECIFIED OSTEOARTHRITIS TYPE, UNSPECIFIED SITE: ICD-10-CM

## 2022-11-02 DIAGNOSIS — H53.8 BLURRED VISION: ICD-10-CM

## 2022-11-02 DIAGNOSIS — R53.1 WEAKNESS: ICD-10-CM

## 2022-11-02 DIAGNOSIS — M85.80 OSTEOPENIA, UNSPECIFIED LOCATION: ICD-10-CM

## 2022-11-02 PROCEDURE — 3079F DIAST BP 80-89 MM HG: CPT | Performed by: INTERNAL MEDICINE

## 2022-11-02 PROCEDURE — 3077F SYST BP >= 140 MM HG: CPT | Performed by: INTERNAL MEDICINE

## 2022-11-02 PROCEDURE — 1126F AMNT PAIN NOTED NONE PRSNT: CPT | Performed by: INTERNAL MEDICINE

## 2022-11-02 PROCEDURE — 3008F BODY MASS INDEX DOCD: CPT | Performed by: INTERNAL MEDICINE

## 2022-11-02 PROCEDURE — 99214 OFFICE O/P EST MOD 30 MIN: CPT | Performed by: INTERNAL MEDICINE

## 2022-11-02 RX ORDER — ATORVASTATIN CALCIUM 40 MG/1
40 TABLET, FILM COATED ORAL NIGHTLY
Qty: 90 TABLET | Refills: 1 | Status: SHIPPED | OUTPATIENT
Start: 2022-11-02

## 2022-11-02 RX ORDER — OFLOXACIN 3 MG/ML
SOLUTION/ DROPS OPHTHALMIC
COMMUNITY
Start: 2020-09-27 | End: 2022-11-02 | Stop reason: ALTCHOICE

## 2022-11-02 RX ORDER — AMLODIPINE BESYLATE 10 MG/1
10 TABLET ORAL DAILY
Qty: 90 TABLET | Refills: 1 | Status: SHIPPED | OUTPATIENT
Start: 2022-11-02 | End: 2022-11-04

## 2022-11-02 RX ORDER — CETIRIZINE HYDROCHLORIDE 10 MG/1
1 TABLET ORAL AS DIRECTED
COMMUNITY
End: 2022-11-02 | Stop reason: ALTCHOICE

## 2022-11-02 RX ORDER — METOPROLOL SUCCINATE 50 MG/1
50 TABLET, EXTENDED RELEASE ORAL DAILY
Qty: 90 TABLET | Refills: 1 | Status: SHIPPED | OUTPATIENT
Start: 2022-11-02

## 2022-11-02 RX ORDER — LOSARTAN POTASSIUM 100 MG/1
100 TABLET ORAL DAILY
Qty: 90 TABLET | Refills: 1 | Status: SHIPPED | OUTPATIENT
Start: 2022-11-02 | End: 2022-11-04

## 2022-11-02 RX ORDER — HYDROCHLOROTHIAZIDE 25 MG/1
12.5 TABLET ORAL DAILY
Qty: 90 TABLET | Refills: 0 | Status: CANCELLED | OUTPATIENT
Start: 2022-11-02

## 2022-11-02 RX ORDER — PANTOPRAZOLE SODIUM 40 MG/1
40 TABLET, DELAYED RELEASE ORAL DAILY
COMMUNITY
Start: 2021-12-21

## 2022-11-02 RX ORDER — CETIRIZINE HYDROCHLORIDE 10 MG/1
CAPSULE, LIQUID FILLED ORAL AS DIRECTED
COMMUNITY

## 2022-11-02 NOTE — PATIENT INSTRUCTIONS
Make sure you are taking calcium at least 1200 mg a day. Make sure you are taking vitamin D at least 800 international units a day.

## 2022-11-04 RX ORDER — LOSARTAN POTASSIUM 100 MG/1
TABLET ORAL
Qty: 90 TABLET | Refills: 1 | Status: SHIPPED | OUTPATIENT
Start: 2022-11-04

## 2022-11-04 RX ORDER — AMLODIPINE BESYLATE 10 MG/1
TABLET ORAL
Qty: 90 TABLET | Refills: 1 | Status: SHIPPED | OUTPATIENT
Start: 2022-11-04

## 2022-11-15 RX ORDER — HYDROCHLOROTHIAZIDE 25 MG/1
25 TABLET ORAL DAILY
Qty: 90 TABLET | Refills: 1 | Status: SHIPPED | OUTPATIENT
Start: 2022-11-15

## 2022-11-15 RX ORDER — METOPROLOL SUCCINATE 50 MG/1
50 TABLET, EXTENDED RELEASE ORAL DAILY
Qty: 90 TABLET | Refills: 1 | Status: SHIPPED | OUTPATIENT
Start: 2022-11-15

## 2022-11-15 RX ORDER — CLOPIDOGREL BISULFATE 75 MG/1
75 TABLET ORAL DAILY
Qty: 90 TABLET | Refills: 1 | Status: SHIPPED | OUTPATIENT
Start: 2022-11-15

## 2022-12-01 PROBLEM — F32.0 CURRENT MILD EPISODE OF MAJOR DEPRESSIVE DISORDER WITHOUT PRIOR EPISODE: Status: ACTIVE | Noted: 2022-12-01

## 2022-12-01 PROBLEM — F32.0 CURRENT MILD EPISODE OF MAJOR DEPRESSIVE DISORDER WITHOUT PRIOR EPISODE (HCC): Status: ACTIVE | Noted: 2022-12-01

## 2022-12-05 ENCOUNTER — OFFICE VISIT (OUTPATIENT)
Dept: OBGYN CLINIC | Facility: CLINIC | Age: 77
End: 2022-12-05
Payer: MEDICARE

## 2022-12-05 VITALS — DIASTOLIC BLOOD PRESSURE: 70 MMHG | BODY MASS INDEX: 32 KG/M2 | WEIGHT: 141 LBS | SYSTOLIC BLOOD PRESSURE: 160 MMHG

## 2022-12-05 DIAGNOSIS — N81.4 UTERINE PROLAPSE: Primary | ICD-10-CM

## 2022-12-05 PROCEDURE — 57160 INSERT PESSARY/OTHER DEVICE: CPT | Performed by: OBSTETRICS & GYNECOLOGY

## 2022-12-05 PROCEDURE — 3078F DIAST BP <80 MM HG: CPT | Performed by: OBSTETRICS & GYNECOLOGY

## 2022-12-05 PROCEDURE — 3077F SYST BP >= 140 MM HG: CPT | Performed by: OBSTETRICS & GYNECOLOGY

## 2023-02-12 ENCOUNTER — WALK IN (OUTPATIENT)
Dept: URGENT CARE | Age: 78
End: 2023-02-12

## 2023-02-12 VITALS
DIASTOLIC BLOOD PRESSURE: 66 MMHG | HEIGHT: 58 IN | RESPIRATION RATE: 18 BRPM | TEMPERATURE: 97.8 F | WEIGHT: 138.89 LBS | OXYGEN SATURATION: 98 % | BODY MASS INDEX: 29.15 KG/M2 | HEART RATE: 57 BPM | SYSTOLIC BLOOD PRESSURE: 110 MMHG

## 2023-02-12 DIAGNOSIS — H10.13 ALLERGIC CONJUNCTIVITIS OF BOTH EYES: Primary | ICD-10-CM

## 2023-02-12 PROCEDURE — 99213 OFFICE O/P EST LOW 20 MIN: CPT | Performed by: NURSE PRACTITIONER

## 2023-02-12 RX ORDER — AZELASTINE HYDROCHLORIDE 0.5 MG/ML
1 SOLUTION/ DROPS OPHTHALMIC 2 TIMES DAILY
Qty: 6 ML | Refills: 1 | Status: SHIPPED | OUTPATIENT
Start: 2023-02-12 | End: 2023-02-19

## 2023-02-12 RX ORDER — CETIRIZINE HYDROCHLORIDE 10 MG/1
TABLET ORAL
COMMUNITY

## 2023-02-12 RX ORDER — MOMETASONE FUROATE 50 UG/1
SPRAY, METERED NASAL
COMMUNITY

## 2023-02-12 ASSESSMENT — ENCOUNTER SYMPTOMS
EYE DISCHARGE: 1
CHILLS: 0
FEVER: 0
COUGH: 0
EYE REDNESS: 1
EYE PAIN: 0
WHEEZING: 0
EYE ITCHING: 1
PHOTOPHOBIA: 0

## 2023-02-12 ASSESSMENT — PAIN SCALES - GENERAL: PAINLEVEL: 0

## 2023-02-12 ASSESSMENT — VISUAL ACUITY: OU: 1

## 2023-04-27 NOTE — TELEPHONE ENCOUNTER
Please review refill protocol failed/ no protocol  Requested Prescriptions   Pending Prescriptions Disp Refills    CLOPIDOGREL 75 MG Oral Tab [Pharmacy Med Name: CLOPIDOGREL 75 MG Tablet] 90 tablet 1     Sig: TAKE 1 TABLET EVERY DAY       There is no refill protocol information for this order

## 2023-04-30 ENCOUNTER — TELEPHONE (OUTPATIENT)
Dept: INTERNAL MEDICINE CLINIC | Facility: CLINIC | Age: 78
End: 2023-04-30

## 2023-04-30 RX ORDER — CLOPIDOGREL BISULFATE 75 MG/1
75 TABLET ORAL DAILY
Qty: 90 TABLET | Refills: 3 | Status: SHIPPED | OUTPATIENT
Start: 2023-04-30

## 2023-04-30 NOTE — TELEPHONE ENCOUNTER
Please schedule physical in June.     Leona Staley DNP  Working with [facility]

## 2023-05-16 ENCOUNTER — OFFICE VISIT (OUTPATIENT)
Facility: CLINIC | Age: 78
End: 2023-05-16

## 2023-05-16 VITALS
WEIGHT: 139 LBS | BODY MASS INDEX: 31 KG/M2 | HEART RATE: 60 BPM | DIASTOLIC BLOOD PRESSURE: 78 MMHG | SYSTOLIC BLOOD PRESSURE: 160 MMHG

## 2023-05-16 DIAGNOSIS — N18.4 HYPERTENSIVE KIDNEY DISEASE WITH STAGE 4 CHRONIC KIDNEY DISEASE (HCC): Primary | ICD-10-CM

## 2023-05-16 DIAGNOSIS — N18.30 STAGE 3 CHRONIC KIDNEY DISEASE, UNSPECIFIED WHETHER STAGE 3A OR 3B CKD (HCC): ICD-10-CM

## 2023-05-16 DIAGNOSIS — N25.81 SECONDARY HYPERPARATHYROIDISM OF RENAL ORIGIN (HCC): ICD-10-CM

## 2023-05-16 DIAGNOSIS — I12.9 HYPERTENSIVE KIDNEY DISEASE WITH STAGE 4 CHRONIC KIDNEY DISEASE (HCC): Primary | ICD-10-CM

## 2023-05-16 DIAGNOSIS — I50.32 CHRONIC DIASTOLIC CONGESTIVE HEART FAILURE (HCC): ICD-10-CM

## 2023-05-16 PROCEDURE — 1159F MED LIST DOCD IN RCRD: CPT | Performed by: INTERNAL MEDICINE

## 2023-05-16 PROCEDURE — 3078F DIAST BP <80 MM HG: CPT | Performed by: INTERNAL MEDICINE

## 2023-05-16 PROCEDURE — 3077F SYST BP >= 140 MM HG: CPT | Performed by: INTERNAL MEDICINE

## 2023-05-16 PROCEDURE — 1160F RVW MEDS BY RX/DR IN RCRD: CPT | Performed by: INTERNAL MEDICINE

## 2023-05-16 PROCEDURE — 99204 OFFICE O/P NEW MOD 45 MIN: CPT | Performed by: INTERNAL MEDICINE

## 2023-05-16 PROCEDURE — 1126F AMNT PAIN NOTED NONE PRSNT: CPT | Performed by: INTERNAL MEDICINE

## 2023-05-16 RX ORDER — CARVEDILOL 12.5 MG/1
12.5 TABLET ORAL 2 TIMES DAILY WITH MEALS
COMMUNITY

## 2023-05-16 RX ORDER — ELECTROLYTES/DEXTROSE
1 SOLUTION, ORAL ORAL DAILY
COMMUNITY

## 2023-06-14 ENCOUNTER — LAB ENCOUNTER (OUTPATIENT)
Dept: LAB | Facility: HOSPITAL | Age: 78
End: 2023-06-14
Attending: INTERNAL MEDICINE
Payer: MEDICARE

## 2023-06-14 DIAGNOSIS — N18.30 STAGE 3 CHRONIC KIDNEY DISEASE, UNSPECIFIED WHETHER STAGE 3A OR 3B CKD (HCC): ICD-10-CM

## 2023-06-14 DIAGNOSIS — N18.4 HYPERTENSIVE KIDNEY DISEASE WITH STAGE 4 CHRONIC KIDNEY DISEASE (HCC): ICD-10-CM

## 2023-06-14 DIAGNOSIS — I12.9 HYPERTENSIVE KIDNEY DISEASE WITH STAGE 4 CHRONIC KIDNEY DISEASE (HCC): ICD-10-CM

## 2023-06-14 LAB
ALBUMIN SERPL-MCNC: 3.4 G/DL (ref 3.4–5)
ANION GAP SERPL CALC-SCNC: 3 MMOL/L (ref 0–18)
BASOPHILS # BLD AUTO: 0.05 X10(3) UL (ref 0–0.2)
BASOPHILS NFR BLD AUTO: 0.9 %
BILIRUB UR QL: NEGATIVE
BUN BLD-MCNC: 25 MG/DL (ref 7–18)
BUN/CREAT SERPL: 18.1 (ref 10–20)
CALCIUM BLD-MCNC: 9 MG/DL (ref 8.5–10.1)
CHLORIDE SERPL-SCNC: 113 MMOL/L (ref 98–112)
CLARITY UR: CLEAR
CO2 SERPL-SCNC: 30 MMOL/L (ref 21–32)
CREAT BLD-MCNC: 1.38 MG/DL
CREAT UR-SCNC: 133 MG/DL
DEPRECATED RDW RBC AUTO: 48.3 FL (ref 35.1–46.3)
EOSINOPHIL # BLD AUTO: 0.31 X10(3) UL (ref 0–0.7)
EOSINOPHIL NFR BLD AUTO: 5.5 %
ERYTHROCYTE [DISTWIDTH] IN BLOOD BY AUTOMATED COUNT: 14.3 % (ref 11–15)
GFR SERPLBLD BASED ON 1.73 SQ M-ARVRAT: 39 ML/MIN/1.73M2 (ref 60–?)
GLUCOSE BLD-MCNC: 123 MG/DL (ref 70–99)
GLUCOSE UR-MCNC: NORMAL MG/DL
HCT VFR BLD AUTO: 35.3 %
HGB BLD-MCNC: 11.5 G/DL
IMM GRANULOCYTES # BLD AUTO: 0.02 X10(3) UL (ref 0–1)
IMM GRANULOCYTES NFR BLD: 0.4 %
KETONES UR-MCNC: NEGATIVE MG/DL
LEUKOCYTE ESTERASE UR QL STRIP.AUTO: 500
LYMPHOCYTES # BLD AUTO: 0.73 X10(3) UL (ref 1–4)
LYMPHOCYTES NFR BLD AUTO: 13.1 %
MCH RBC QN AUTO: 30.1 PG (ref 26–34)
MCHC RBC AUTO-ENTMCNC: 32.6 G/DL (ref 31–37)
MCV RBC AUTO: 92.4 FL
MONOCYTES # BLD AUTO: 0.51 X10(3) UL (ref 0.1–1)
MONOCYTES NFR BLD AUTO: 9.1 %
NEUTROPHILS # BLD AUTO: 3.97 X10 (3) UL (ref 1.5–7.7)
NEUTROPHILS # BLD AUTO: 3.97 X10(3) UL (ref 1.5–7.7)
NEUTROPHILS NFR BLD AUTO: 71 %
NITRITE UR QL STRIP.AUTO: NEGATIVE
OSMOLALITY SERPL CALC.SUM OF ELEC: 308 MOSM/KG (ref 275–295)
PH UR: 5.5 [PH] (ref 5–8)
PHOSPHATE SERPL-MCNC: 3.1 MG/DL (ref 2.5–4.9)
PLATELET # BLD AUTO: 140 10(3)UL (ref 150–450)
POTASSIUM SERPL-SCNC: 4.6 MMOL/L (ref 3.5–5.1)
PROT UR-MCNC: 20.6 MG/DL
PROT UR-MCNC: NEGATIVE MG/DL
PROT/CREAT UR-RTO: 0.15
PTH-INTACT SERPL-MCNC: 63 PG/ML (ref 18.5–88)
RBC # BLD AUTO: 3.82 X10(6)UL
SODIUM SERPL-SCNC: 146 MMOL/L (ref 136–145)
SP GR UR STRIP: 1.02 (ref 1–1.03)
UROBILINOGEN UR STRIP-ACNC: NORMAL
WBC # BLD AUTO: 5.6 X10(3) UL (ref 4–11)

## 2023-06-14 PROCEDURE — 83970 ASSAY OF PARATHORMONE: CPT

## 2023-06-14 PROCEDURE — 36415 COLL VENOUS BLD VENIPUNCTURE: CPT

## 2023-06-14 PROCEDURE — 82570 ASSAY OF URINE CREATININE: CPT

## 2023-06-14 PROCEDURE — 81001 URINALYSIS AUTO W/SCOPE: CPT

## 2023-06-14 PROCEDURE — 84156 ASSAY OF PROTEIN URINE: CPT

## 2023-06-14 PROCEDURE — 80069 RENAL FUNCTION PANEL: CPT

## 2023-06-14 PROCEDURE — 85025 COMPLETE CBC W/AUTO DIFF WBC: CPT

## 2023-06-22 ENCOUNTER — OFFICE VISIT (OUTPATIENT)
Dept: OPHTHALMOLOGY | Facility: CLINIC | Age: 78
End: 2023-06-22

## 2023-06-22 DIAGNOSIS — H35.371 EPIRETINAL MEMBRANE (ERM) OF RIGHT EYE: ICD-10-CM

## 2023-06-22 DIAGNOSIS — H43.391 FLOATERS, RIGHT: ICD-10-CM

## 2023-06-22 DIAGNOSIS — Z96.1 PSEUDOPHAKIA OF BOTH EYES: Primary | ICD-10-CM

## 2023-06-22 PROCEDURE — 1160F RVW MEDS BY RX/DR IN RCRD: CPT | Performed by: OPHTHALMOLOGY

## 2023-06-22 PROCEDURE — 92004 COMPRE OPH EXAM NEW PT 1/>: CPT | Performed by: OPHTHALMOLOGY

## 2023-06-22 PROCEDURE — 1159F MED LIST DOCD IN RCRD: CPT | Performed by: OPHTHALMOLOGY

## 2023-06-22 PROCEDURE — 1126F AMNT PAIN NOTED NONE PRSNT: CPT | Performed by: OPHTHALMOLOGY

## 2023-06-22 NOTE — PATIENT INSTRUCTIONS
Pseudophakia of both eyes  No treatment. Floaters, right   There is no evidence of retinal pathology. All signs and symptoms of retinal detachment/tears explained in detail. Patient instructed to call the office if they experience increase in floaters, increase in flashes of light, loss of vision or curtain or veil effect. Epiretinal membrane (ERM) of right eye  Will continue to watch.

## 2023-08-22 ENCOUNTER — OFFICE VISIT (OUTPATIENT)
Facility: CLINIC | Age: 78
End: 2023-08-22

## 2023-08-22 ENCOUNTER — LAB ENCOUNTER (OUTPATIENT)
Dept: LAB | Facility: HOSPITAL | Age: 78
End: 2023-08-22
Attending: INTERNAL MEDICINE
Payer: MEDICARE

## 2023-08-22 VITALS
DIASTOLIC BLOOD PRESSURE: 80 MMHG | HEART RATE: 64 BPM | WEIGHT: 141 LBS | BODY MASS INDEX: 32 KG/M2 | SYSTOLIC BLOOD PRESSURE: 138 MMHG

## 2023-08-22 DIAGNOSIS — N18.30 STAGE 3 CHRONIC KIDNEY DISEASE, UNSPECIFIED WHETHER STAGE 3A OR 3B CKD (HCC): Primary | ICD-10-CM

## 2023-08-22 DIAGNOSIS — N18.4 HYPERTENSIVE KIDNEY DISEASE WITH STAGE 4 CHRONIC KIDNEY DISEASE (HCC): ICD-10-CM

## 2023-08-22 DIAGNOSIS — N18.30 ANEMIA DUE TO STAGE 3 CHRONIC KIDNEY DISEASE, UNSPECIFIED WHETHER STAGE 3A OR 3B CKD: ICD-10-CM

## 2023-08-22 DIAGNOSIS — I50.32 CHRONIC DIASTOLIC CONGESTIVE HEART FAILURE (HCC): ICD-10-CM

## 2023-08-22 DIAGNOSIS — N25.81 SECONDARY HYPERPARATHYROIDISM OF RENAL ORIGIN (HCC): ICD-10-CM

## 2023-08-22 DIAGNOSIS — D63.1 ANEMIA DUE TO STAGE 3 CHRONIC KIDNEY DISEASE, UNSPECIFIED WHETHER STAGE 3A OR 3B CKD: ICD-10-CM

## 2023-08-22 DIAGNOSIS — I12.9 HYPERTENSIVE KIDNEY DISEASE WITH STAGE 4 CHRONIC KIDNEY DISEASE (HCC): ICD-10-CM

## 2023-08-22 DIAGNOSIS — N18.30 STAGE 3 CHRONIC KIDNEY DISEASE, UNSPECIFIED WHETHER STAGE 3A OR 3B CKD (HCC): ICD-10-CM

## 2023-08-22 LAB
ALBUMIN SERPL-MCNC: 3.6 G/DL (ref 3.4–5)
ANION GAP SERPL CALC-SCNC: 4 MMOL/L (ref 0–18)
BASOPHILS # BLD AUTO: 0.06 X10(3) UL (ref 0–0.2)
BASOPHILS NFR BLD AUTO: 0.9 %
BILIRUB UR QL: NEGATIVE
BUN BLD-MCNC: 24 MG/DL (ref 7–18)
BUN/CREAT SERPL: 16.1 (ref 10–20)
CALCIUM BLD-MCNC: 8.8 MG/DL (ref 8.5–10.1)
CHLORIDE SERPL-SCNC: 109 MMOL/L (ref 98–112)
CLARITY UR: CLEAR
CO2 SERPL-SCNC: 31 MMOL/L (ref 21–32)
CREAT BLD-MCNC: 1.49 MG/DL
CREAT UR-SCNC: 174 MG/DL
DEPRECATED HBV CORE AB SER IA-ACNC: 22.2 NG/ML
DEPRECATED RDW RBC AUTO: 47.2 FL (ref 35.1–46.3)
EGFRCR SERPLBLD CKD-EPI 2021: 36 ML/MIN/1.73M2 (ref 60–?)
EOSINOPHIL # BLD AUTO: 0.26 X10(3) UL (ref 0–0.7)
EOSINOPHIL NFR BLD AUTO: 3.8 %
ERYTHROCYTE [DISTWIDTH] IN BLOOD BY AUTOMATED COUNT: 13.5 % (ref 11–15)
GLUCOSE BLD-MCNC: 102 MG/DL (ref 70–99)
GLUCOSE UR-MCNC: NORMAL MG/DL
HCT VFR BLD AUTO: 37 %
HGB BLD-MCNC: 12.1 G/DL
HGB UR QL STRIP.AUTO: NEGATIVE
IMM GRANULOCYTES # BLD AUTO: 0.02 X10(3) UL (ref 0–1)
IMM GRANULOCYTES NFR BLD: 0.3 %
IRON SATN MFR SERPL: 22 %
IRON SERPL-MCNC: 83 UG/DL
KETONES UR-MCNC: NEGATIVE MG/DL
LEUKOCYTE ESTERASE UR QL STRIP.AUTO: 250
LYMPHOCYTES # BLD AUTO: 0.98 X10(3) UL (ref 1–4)
LYMPHOCYTES NFR BLD AUTO: 14.5 %
MCH RBC QN AUTO: 31.2 PG (ref 26–34)
MCHC RBC AUTO-ENTMCNC: 32.7 G/DL (ref 31–37)
MCV RBC AUTO: 95.4 FL
MONOCYTES # BLD AUTO: 0.55 X10(3) UL (ref 0.1–1)
MONOCYTES NFR BLD AUTO: 8.1 %
NEUTROPHILS # BLD AUTO: 4.9 X10 (3) UL (ref 1.5–7.7)
NEUTROPHILS # BLD AUTO: 4.9 X10(3) UL (ref 1.5–7.7)
NEUTROPHILS NFR BLD AUTO: 72.4 %
NITRITE UR QL STRIP.AUTO: NEGATIVE
OSMOLALITY SERPL CALC.SUM OF ELEC: 302 MOSM/KG (ref 275–295)
PH UR: 5 [PH] (ref 5–8)
PHOSPHATE SERPL-MCNC: 2.7 MG/DL (ref 2.5–4.9)
PLATELET # BLD AUTO: 165 10(3)UL (ref 150–450)
POTASSIUM SERPL-SCNC: 3.8 MMOL/L (ref 3.5–5.1)
PROT UR-MCNC: 18.4 MG/DL
PROT UR-MCNC: NEGATIVE MG/DL
PROT/CREAT UR-RTO: 0.11
PTH-INTACT SERPL-MCNC: 45.3 PG/ML (ref 18.5–88)
RBC # BLD AUTO: 3.88 X10(6)UL
SODIUM SERPL-SCNC: 144 MMOL/L (ref 136–145)
SP GR UR STRIP: 1.02 (ref 1–1.03)
TIBC SERPL-MCNC: 374 UG/DL (ref 240–450)
TRANSFERRIN SERPL-MCNC: 251 MG/DL (ref 200–360)
UROBILINOGEN UR STRIP-ACNC: NORMAL
WBC # BLD AUTO: 6.8 X10(3) UL (ref 4–11)

## 2023-08-22 PROCEDURE — 83540 ASSAY OF IRON: CPT

## 2023-08-22 PROCEDURE — 85025 COMPLETE CBC W/AUTO DIFF WBC: CPT

## 2023-08-22 PROCEDURE — 1159F MED LIST DOCD IN RCRD: CPT | Performed by: INTERNAL MEDICINE

## 2023-08-22 PROCEDURE — 1126F AMNT PAIN NOTED NONE PRSNT: CPT | Performed by: INTERNAL MEDICINE

## 2023-08-22 PROCEDURE — 99214 OFFICE O/P EST MOD 30 MIN: CPT | Performed by: INTERNAL MEDICINE

## 2023-08-22 PROCEDURE — 82570 ASSAY OF URINE CREATININE: CPT

## 2023-08-22 PROCEDURE — 80069 RENAL FUNCTION PANEL: CPT

## 2023-08-22 PROCEDURE — 84466 ASSAY OF TRANSFERRIN: CPT

## 2023-08-22 PROCEDURE — 36415 COLL VENOUS BLD VENIPUNCTURE: CPT

## 2023-08-22 PROCEDURE — 84156 ASSAY OF PROTEIN URINE: CPT

## 2023-08-22 PROCEDURE — 83970 ASSAY OF PARATHORMONE: CPT

## 2023-08-22 PROCEDURE — 3079F DIAST BP 80-89 MM HG: CPT | Performed by: INTERNAL MEDICINE

## 2023-08-22 PROCEDURE — 1160F RVW MEDS BY RX/DR IN RCRD: CPT | Performed by: INTERNAL MEDICINE

## 2023-08-22 PROCEDURE — 82728 ASSAY OF FERRITIN: CPT

## 2023-08-22 PROCEDURE — 81001 URINALYSIS AUTO W/SCOPE: CPT

## 2023-08-22 PROCEDURE — 87086 URINE CULTURE/COLONY COUNT: CPT

## 2023-08-22 PROCEDURE — 3075F SYST BP GE 130 - 139MM HG: CPT | Performed by: INTERNAL MEDICINE

## 2023-08-22 NOTE — PROGRESS NOTES
Shannon Forde      Reason for Consult: CKD    HPI:       67 y/o F with h/o HTN X20 + yrs, prediabetes, DJ, HLD, h/o CVA  and CKD here for fu  Use to take Advil for pain in past but not any more. Bp at home 114/60's  checks BP at home lower in the morning  At night around 140's- improved ever since she increased her hydrochlorothiazide.   Reviewed labs with her and elevated cr noted since few yrs, however stable 1.6-1.9 mg/dl range    Subjective  No new issues  Bp well controlled      HISTORY:  Past Medical History:   Diagnosis Date    CKD (chronic kidney disease) stage 4, GFR 15-29 ml/min ([facility])     Colon polyps     DJD (degenerative joint disease) of cervical spine     Floater, vitreous 2015    Hyperlipidemia     Hypertension     Peripheral vascular disease ([address]     Shingles     Stroke due to occlusion of right middle cerebral artery ([address]     Uterine prolapse     Varicose veins of both lower extremities       Past Surgical History:   Procedure Laterality Date    CATARACT EXTRACTION W/  INTRAOCULAR LENS IMPLANT Right 2020    Done by Dr. Inderjit Quinteros Left 10/08/2020    Done by Dr. Bogdan White  2016    [facility], [facility]. FITTING AND INSERTION OF PESSARY  2020      Family History   Problem Relation Age of Onset    Diabetes Mother     Heart Disorder Father     Glaucoma Neg     Macular degeneration Neg       Social History:   Social History     Socioeconomic History    Marital status: Legally     Number of children: 4   Occupational History    Occupation: housekeeping     Comment: retired   Tobacco Use    Smoking status: Former     Packs/day: 1.00     Years: 9.00     Pack years: 9.00     Types: Cigarettes     Quit date: 1983     Years since quittin.6    Smokeless tobacco: Never   Vaping Use    Vaping Use: Never used   Substance and Sexual Activity    Alcohol use: No     Alcohol/week: 0.0 standard drinks of alcohol    Drug use: No    Sexual activity: Not Currently     Partners: Male   Other Topics Concern    Caffeine Concern Yes     Comment: 1 cup of decaf coffee once a week    Exercise No    Bike Helmet Yes     Comment: Active   Social History Narrative    The patient does not use an assistive device. .      The patient does live in a home with stairs. Medications (Active prior to today's visit):  Current Outpatient Medications   Medication Sig Dispense Refill    amLODIPine 10 MG Oral Tab Take 1 tablet (10 mg total) by mouth daily. 90 tablet 0    carvedilol 12.5 MG Oral Tab Take 1 tablet (12.5 mg total) by mouth 2 (two) times daily with meals. Multiple Vitamin (MULTIVITAMIN ADULT) Oral Tab Take 1 tablet by mouth daily. clopidogrel 75 MG Oral Tab Take 1 tablet (75 mg total) by mouth daily. 90 tablet 3    hydroCHLOROthiazide 25 MG Oral Tab Take 1 tablet (25 mg total) by mouth daily. 90 tablet 1    LOSARTAN 100 MG Oral Tab TAKE 1 TABLET (100 MG TOTAL) BY MOUTH DAILY. (Patient taking differently: 0.5 tablets (50 mg total) daily.) 90 tablet 1    Cetirizine HCl (ZYRTEC ALLERGY) 10 MG Oral Cap Take by mouth As Directed. cholecalciferol 1000 UNITS Oral Cap Take by mouth As Directed. atorvastatin 40 MG Oral Tab Take 1 tablet (40 mg total) by mouth nightly. 90 tablet 1    triamcinolone 0.1 % External Ointment Apply 1 Application topically 2 (two) times daily. 454 g 1    albuterol (VENTOLIN HFA) 108 (90 Base) MCG/ACT Inhalation Aero Soln Inhale 2 puffs into the lungs every 6 (six) hours as needed for Wheezing (cough) 1 each 0    gabapentin 100 MG Oral Cap 1-3 tabs nightly 270 capsule 1    clotrimazole-betamethasone 1-0.05 % External Cream Apply 1 Application topically 2 (two) times daily as needed. 15 g 3    mometasone furoate 50 MCG/ACT Nasal Suspension 2 spray(s)      Vitamin B-12 1000 MCG Oral Tab Take 1 tablet (1,000 mcg total) by mouth daily.  30 tablet 3    Saline Nasal Spray (SALINE MIST) 0.65 % Nasal Solution 1 spray by Nasal route as needed for congestion. Allergies: Other                   OTHER (SEE COMMENTS), UNKNOWN    Comment:Unknown.  Pt reports having an allergic reaction \"a             long, long time ago\"  Latex                   ITCHING  Sulfa Antibiotics       UNKNOWN  Duloxetine              DIARRHEA  Latex                   ITCHING      ROS:     Constitutional:  Negative for decreased activity, fever, irritability and lethargy  ENMT:  Negative for ear drainage, hearing loss and nasal drainage  Eyes:  Negative for eye discharge and vision loss  Cardiovascular:  Negative for chest pain, sobs  Respiratory:  Negative for cough, dyspnea and wheezing  Gastrointestinal:  Negative for abdominal pain, constipation  Genitourinary:  Negative for dysuria and hematuria  Endocrine:  Negative for abnormal sleep patterns, increased activity  Hema/Lymph:  Negative for easy bleeding and easy bruising  Integumentary:  Negative for pruritus and rash  Musculoskeletal:  Negative for bone/joint symptoms  Neurological:  Negative for gait disturbance  Psychiatric:  Negative for inappropriate interaction and psychiatric symptoms       08/22/23  1010   BP: 138/80   Pulse: 64         PHYSICAL EXAM:   Constitutional: appears well hydrated alert and responsive no acute distress noted  Head/Face: normocephalic  Eyes/Vision: normal extraocular motion is intact  Nose/Mouth/Throat:mucous membranes are moist   Neck/Thyroid: neck is supple without adenopathy  Lymphatic: no abnormal cervical, supraclavicular adenopathy is noted  Respiratory:  lungs are clear to auscultation bilaterally  Cardiovascular: regular rate and rhythm  Abdomen: soft, non-tender, non-distended, BS normal  Skin/Hair: no unusual rashes present  Back/Spine: no abnormalities noted  Musculoskeletal:  no deformities  Extremities: no edema  Neurological:  Grossly normal    Lab Results   Component Value Date     (H) 06/14/2023     07/26/2022     04/21/2022 K 4.6 06/14/2023    K 4.2 07/26/2022    K 4.2 04/21/2022     (H) 06/14/2023     07/26/2022     04/21/2022    CO2 30.0 06/14/2023    CO2 25.0 07/26/2022    CO2 30.0 04/21/2022    BUN 25 (H) 06/14/2023    BUN 34 (H) 07/26/2022    BUN 22 (H) 04/21/2022    CREATSERUM 1.38 (H) 06/14/2023    CREATSERUM 1.72 (H) 07/26/2022    CREATSERUM 1.64 (H) 04/21/2022    CA 9.0 06/14/2023    CA 9.5 07/26/2022    CA 9.5 04/21/2022    EGFRCR 39 (L) 06/14/2023    ALB 3.4 06/14/2023    ALB 3.9 07/26/2022    ALB 3.9 04/21/2022    PHOS 3.1 06/14/2023    PTH 63.0 06/14/2023      Labs   2/2023 CR 1.7 mg/dl, eGFR 30 ml/min  5/2023 cr 1.6 mg/dl, eGFR 33  Hep c NR      ASSESSMENT/PLAN:   Assessment   1. Stage 3 chronic kidney disease, unspecified whether stage 3a or 3b CKD ([address]  - RENAL FUNCTION PANEL; Future  - CBC WITH DIFFERENTIAL WITH PLATELET; Future  - PTH, INTACT; Future  - URINE PROTEIN/CREATININE RATIO, RANDOM; Future  - URINALYSIS WITH CULTURE REFLEX; Future  - IRON AND TIBC; Future  - FERRITIN; Future    2. Hypertensive kidney disease with stage 4 chronic kidney disease ([address]    3. Secondary hyperparathyroidism of renal origin ([address]    4. Chronic diastolic congestive heart failure ([address]    5. Anemia due to stage 3 chronic kidney disease, unspecified whether stage 3a or 3b CKD          1. 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 [facility] UA with no RBCs  UMCR unremarkable  1/26/2023: Renal art duplex small kidneys with cortical thinning and lobulated contours, no evidence of DARYL  6/2020: Echo : wall thickness noted, EF 55-50% and Grade 2 DD   Cr reviewed with pt and cr ranges 1.6-1.9 mg/dl range overall stable since 2018   Discussed avoiding NSAIDS, PPI,   Discussed low salt diet and exercise  Recent UA with wbc and rbc--?     2 HTN with CKD  On Arb, hydrochlorothiazide, amlodipine and coreg  No DARYL on doppler US  BP improved after increasing hydrochlorothiazide.   She will bring her BP log next appt    3 secondary HPTH  Vit d acceptable  Ipth 63, phosp acceptable    4 CHFpEF  Grade 2 DD  Compensated    5 anemia  Check iron panel    PLAN  Renal function stable - cr 1.3 mg/dl ( June labs)  Hydrate well bc of hypernatremia  Check iron panel  Low salt diet  Avoid nsaids    Labs today   Also labs and fu in 6 mths       Orders This Visit:  Orders Placed This Encounter      Renal Function Panel      CBC With Differential With Platelet      PTH, Intact      Protein/Creatinine Ratio, Urine Random      Urinalysis with Culture Reflex      Iron And Tibc      Ferritin      Meds This Visit:  Requested Prescriptions      No prescriptions requested or ordered in this encounter       Imaging & Referrals:  None     Malini Sidhu MD  8/22/2023   [address]

## 2023-08-23 ENCOUNTER — TELEPHONE (OUTPATIENT)
Facility: CLINIC | Age: 78
End: 2023-08-23

## 2023-08-23 DIAGNOSIS — N18.30 STAGE 3 CHRONIC KIDNEY DISEASE, UNSPECIFIED WHETHER STAGE 3A OR 3B CKD (HCC): Primary | ICD-10-CM

## 2024-07-10 ENCOUNTER — TELEPHONE (OUTPATIENT)
Dept: INTERNAL MEDICINE CLINIC | Facility: CLINIC | Age: 79
End: 2024-07-10

## 2024-07-10 NOTE — TELEPHONE ENCOUNTER
Patient due for her annual physical exam and colonoscopy. Patient last seen in office on 12/1/22, no future appointments scheduled. Care Gap letter generated and mailed to patients home address listed in EMR.

## 2024-09-30 ENCOUNTER — APPOINTMENT (OUTPATIENT)
Dept: CT IMAGING | Age: 79
End: 2024-09-30
Attending: STUDENT IN AN ORGANIZED HEALTH CARE EDUCATION/TRAINING PROGRAM

## 2024-09-30 ENCOUNTER — HOSPITAL ENCOUNTER (EMERGENCY)
Age: 79
Discharge: HOME | End: 2024-09-30
Attending: STUDENT IN AN ORGANIZED HEALTH CARE EDUCATION/TRAINING PROGRAM

## 2024-09-30 ENCOUNTER — APPOINTMENT (OUTPATIENT)
Dept: GENERAL RADIOLOGY | Age: 79
End: 2024-09-30
Attending: STUDENT IN AN ORGANIZED HEALTH CARE EDUCATION/TRAINING PROGRAM

## 2024-09-30 VITALS
SYSTOLIC BLOOD PRESSURE: 153 MMHG | TEMPERATURE: 98.1 F | WEIGHT: 141.98 LBS | HEART RATE: 58 BPM | RESPIRATION RATE: 17 BRPM | HEIGHT: 62 IN | BODY MASS INDEX: 26.13 KG/M2 | DIASTOLIC BLOOD PRESSURE: 67 MMHG | OXYGEN SATURATION: 94 %

## 2024-09-30 DIAGNOSIS — M54.2 NECK PAIN ON LEFT SIDE: ICD-10-CM

## 2024-09-30 DIAGNOSIS — R51.9 NONINTRACTABLE EPISODIC HEADACHE, UNSPECIFIED HEADACHE TYPE: ICD-10-CM

## 2024-09-30 DIAGNOSIS — M25.512 ACUTE PAIN OF LEFT SHOULDER: Primary | ICD-10-CM

## 2024-09-30 PROCEDURE — 73030 X-RAY EXAM OF SHOULDER: CPT

## 2024-09-30 PROCEDURE — 10004651 HB RX, NO CHARGE ITEM: Performed by: STUDENT IN AN ORGANIZED HEALTH CARE EDUCATION/TRAINING PROGRAM

## 2024-09-30 PROCEDURE — 99284 EMERGENCY DEPT VISIT MOD MDM: CPT

## 2024-09-30 PROCEDURE — 72125 CT NECK SPINE W/O DYE: CPT

## 2024-09-30 PROCEDURE — 70450 CT HEAD/BRAIN W/O DYE: CPT

## 2024-09-30 RX ORDER — CYCLOBENZAPRINE HCL 10 MG
5 TABLET ORAL 3 TIMES DAILY PRN
Qty: 15 TABLET | Refills: 0 | Status: SHIPPED | OUTPATIENT
Start: 2024-09-30

## 2024-09-30 RX ORDER — ACETAMINOPHEN 500 MG
1000 TABLET ORAL ONCE
Status: COMPLETED | OUTPATIENT
Start: 2024-09-30 | End: 2024-09-30

## 2024-09-30 RX ADMIN — ACETAMINOPHEN 1000 MG: 500 TABLET ORAL at 18:04

## 2024-09-30 ASSESSMENT — PAIN SCALES - GENERAL: PAINLEVEL_OUTOF10: 4

## 2024-09-30 ASSESSMENT — ENCOUNTER SYMPTOMS
NUMBNESS: 0
HEADACHES: 1
WEAKNESS: 0

## 2024-10-03 ENCOUNTER — TELEPHONE (OUTPATIENT)
Dept: INTERNAL MEDICINE | Age: 79
End: 2024-10-03

## 2024-10-04 ENCOUNTER — APPOINTMENT (OUTPATIENT)
Dept: FAMILY MEDICINE | Age: 79
End: 2024-10-04

## 2024-10-04 VITALS
HEIGHT: 57 IN | WEIGHT: 141.09 LBS | HEART RATE: 62 BPM | SYSTOLIC BLOOD PRESSURE: 128 MMHG | OXYGEN SATURATION: 97 % | BODY MASS INDEX: 30.44 KG/M2 | TEMPERATURE: 96.8 F | DIASTOLIC BLOOD PRESSURE: 70 MMHG

## 2024-10-04 DIAGNOSIS — I73.9 PAD (PERIPHERAL ARTERY DISEASE) (CMD): ICD-10-CM

## 2024-10-04 DIAGNOSIS — I83.813 VARICOSE VEINS OF BOTH LOWER EXTREMITIES WITH PAIN: ICD-10-CM

## 2024-10-04 DIAGNOSIS — S13.4XXD WHIPLASH INJURY TO NECK, SUBSEQUENT ENCOUNTER: ICD-10-CM

## 2024-10-04 DIAGNOSIS — I10 ESSENTIAL HYPERTENSION: ICD-10-CM

## 2024-10-04 DIAGNOSIS — G44.321 INTRACTABLE CHRONIC POST-TRAUMATIC HEADACHE: ICD-10-CM

## 2024-10-04 DIAGNOSIS — F32.0 CURRENT MILD EPISODE OF MAJOR DEPRESSIVE DISORDER WITHOUT PRIOR EPISODE (CMD): ICD-10-CM

## 2024-10-04 DIAGNOSIS — E78.2 MIXED HYPERLIPIDEMIA: ICD-10-CM

## 2024-10-04 DIAGNOSIS — N25.81 SECONDARY HYPERPARATHYROIDISM OF RENAL ORIGIN  (CMD): ICD-10-CM

## 2024-10-04 DIAGNOSIS — Z00.00 MEDICARE ANNUAL WELLNESS VISIT, SUBSEQUENT: Primary | ICD-10-CM

## 2024-10-04 DIAGNOSIS — Z86.73 HISTORY OF ISCHEMIC RIGHT MCA STROKE: ICD-10-CM

## 2024-10-04 DIAGNOSIS — N18.4 CKD (CHRONIC KIDNEY DISEASE) STAGE 4, GFR 15-29 ML/MIN  (CMD): ICD-10-CM

## 2024-10-04 DIAGNOSIS — R73.03 PREDIABETES: ICD-10-CM

## 2024-10-04 PROBLEM — J30.9 ALLERGIC RHINITIS: Status: ACTIVE | Noted: 2023-06-23

## 2024-10-04 PROBLEM — N81.4 UTERINE PROLAPSE: Status: ACTIVE | Noted: 2020-01-13

## 2024-10-04 PROBLEM — E78.5 HYPERLIPIDEMIA: Status: ACTIVE | Noted: 2017-04-14

## 2024-10-04 PROBLEM — M85.80 OSTEOPENIA: Status: ACTIVE | Noted: 2023-05-06

## 2024-10-04 PROBLEM — M54.12 CERVICAL RADICULOPATHY: Status: ACTIVE | Noted: 2017-10-24

## 2024-10-04 PROBLEM — N18.32 STAGE 3B CHRONIC KIDNEY DISEASE  (CMD): Status: ACTIVE | Noted: 2019-01-14

## 2024-10-04 PROBLEM — H35.371 EPIRETINAL MEMBRANE (ERM) OF RIGHT EYE: Status: ACTIVE | Noted: 2023-06-22

## 2024-10-04 PROBLEM — J45.20 MILD INTERMITTENT ASTHMA WITHOUT COMPLICATION (CMD): Status: ACTIVE | Noted: 2023-01-04

## 2024-10-04 PROBLEM — B02.9 HERPES ZOSTER: Status: RESOLVED | Noted: 2020-11-30 | Resolved: 2024-10-04

## 2024-10-04 PROBLEM — M47.812 FACET ARTHRITIS OF CERVICAL REGION: Status: ACTIVE | Noted: 2020-06-11

## 2024-10-04 PROBLEM — B00.9 HSV-1 (HERPES SIMPLEX VIRUS 1) INFECTION: Status: RESOLVED | Noted: 2021-05-25 | Resolved: 2024-10-04

## 2024-10-04 PROBLEM — I12.9 HYPERTENSIVE KIDNEY DISEASE WITH STAGE 4 CHRONIC KIDNEY DISEASE  (CMD): Status: ACTIVE | Noted: 2024-01-04

## 2024-10-04 PROBLEM — Z96.1 PSEUDOPHAKIA OF BOTH EYES: Status: ACTIVE | Noted: 2023-06-22

## 2024-10-04 PROBLEM — G62.9 POLYNEUROPATHY: Status: ACTIVE | Noted: 2024-02-07

## 2024-10-04 PROBLEM — I83.93 VARICOSE VEINS OF BOTH LOWER EXTREMITIES: Status: ACTIVE | Noted: 2024-10-04

## 2024-10-04 PROBLEM — M54.16 LUMBAR RADICULOPATHY: Status: ACTIVE | Noted: 2023-04-10

## 2024-10-04 RX ORDER — CARVEDILOL 12.5 MG/1
12.5 TABLET ORAL 2 TIMES DAILY WITH MEALS
COMMUNITY
Start: 2024-06-26

## 2024-10-04 RX ORDER — GABAPENTIN 100 MG/1
200 CAPSULE ORAL EVERY EVENING
COMMUNITY
Start: 2024-10-01

## 2024-10-04 RX ORDER — SERTRALINE HYDROCHLORIDE 25 MG/1
25 TABLET, FILM COATED ORAL DAILY
COMMUNITY
Start: 2024-06-27

## 2024-10-04 RX ORDER — NIFEDIPINE 60 MG/1
60 TABLET, EXTENDED RELEASE ORAL 2 TIMES DAILY
COMMUNITY
Start: 2024-08-02

## 2024-10-04 RX ORDER — LIDOCAINE 50 MG/G
1 PATCH TOPICAL PRN
COMMUNITY
Start: 2024-10-02

## 2024-10-04 RX ORDER — PERFLUOROHEXYLOCTANE 1 MG/MG
1 SOLUTION OPHTHALMIC 4 TIMES DAILY
COMMUNITY
Start: 2024-05-26

## 2024-10-04 RX ORDER — IPRATROPIUM BROMIDE 21 UG/1
1 SPRAY, METERED NASAL 2 TIMES DAILY
COMMUNITY
Start: 2024-05-01

## 2024-10-04 ASSESSMENT — MINI COG
TOTAL SCORE: 5
PATIENT ABLE TO FILL IN THE CLOCK FACE WITH 10 MINUTES PAST 11 O'CLOCK?: YES, CLOCK IS CORRECT
PATIENT ABLE TO REPEAT THE 3 WORDS GIVEN PREVIOUSLY?: WAS ABLE TO REPEAT BACK 3 WORDS CORRECTLY
PATIENT WAS GIVEN REPEAT BACK WORDS FROM VERSION: 1 - BANANA SUNRISE CHAIR

## 2024-10-04 ASSESSMENT — ACTIVITIES OF DAILY LIVING (ADL)
DRESSING: INDEPENDENT
ADL_SHORT_OF_BREATH: NO
TRANSFERRING: INDEPENDENT
ADL_BEFORE_ADMISSION: INDEPENDENT
NEEDS_ASSIST: NO
TAKING MEDICATION: INDEPENDENT
USING TRANSPORTATION: INDEPENDENT
NEEDS ASSISTANCE WITH FOOD: INDEPENDENT
STIL DRIVING: YES
DOING HOUSEWORK: INDEPENDENT
PILL BOX USED: NO
PREPARING MEALS: INDEPENDENT
RECENT_DECLINE_ADL: NO
GROCERY SHOPPING: INDEPENDENT
ADL_SCORE: 24
USING TELEPHONE: INDEPENDENT
SENSORY_SUPPORT_DEVICES: EYEGLASSES;HEARING AIDS;DENTURES
BATHING: INDEPENDENT
TOILETING: INDEPENDENT
FEEDING: INDEPENDENT
CONTINENCE: INDEPENDENT
MANAGING FINANCES: INDEPENDENT
EATING: INDEPENDENT

## 2024-10-04 ASSESSMENT — PATIENT HEALTH QUESTIONNAIRE - PHQ9
1. LITTLE INTEREST OR PLEASURE IN DOING THINGS: NOT AT ALL
2. FEELING DOWN, DEPRESSED OR HOPELESS: MORE THAN HALF THE DAYS
CLINICAL INTERPRETATION OF PHQ2 SCORE: NO FURTHER SCREENING NEEDED
SUM OF ALL RESPONSES TO PHQ9 QUESTIONS 1 AND 2: 2
SUM OF ALL RESPONSES TO PHQ9 QUESTIONS 1 AND 2: 2

## 2024-10-08 ENCOUNTER — HOSPITAL ENCOUNTER (OUTPATIENT)
Dept: MRI IMAGING | Age: 79
Discharge: HOME | End: 2024-10-08
Attending: STUDENT IN AN ORGANIZED HEALTH CARE EDUCATION/TRAINING PROGRAM

## 2024-10-08 ENCOUNTER — TELEPHONE (OUTPATIENT)
Dept: INTERNAL MEDICINE | Age: 79
End: 2024-10-08

## 2024-10-08 DIAGNOSIS — Z86.73 HISTORY OF ISCHEMIC RIGHT MCA STROKE: ICD-10-CM

## 2024-10-08 DIAGNOSIS — G44.321 INTRACTABLE CHRONIC POST-TRAUMATIC HEADACHE: ICD-10-CM

## 2024-10-08 PROCEDURE — 70551 MRI BRAIN STEM W/O DYE: CPT

## 2024-10-11 ENCOUNTER — TELEPHONE (OUTPATIENT)
Dept: FAMILY MEDICINE | Age: 79
End: 2024-10-11

## 2024-10-11 DIAGNOSIS — G44.321 INTRACTABLE CHRONIC POST-TRAUMATIC HEADACHE: Primary | ICD-10-CM

## 2024-10-11 DIAGNOSIS — Z86.73 HISTORY OF ISCHEMIC RIGHT MCA STROKE: ICD-10-CM

## 2024-10-15 ENCOUNTER — TELEPHONE (OUTPATIENT)
Dept: FAMILY MEDICINE | Age: 79
End: 2024-10-15

## 2024-10-16 ENCOUNTER — HOSPITAL ENCOUNTER (OUTPATIENT)
Dept: MRI IMAGING | Age: 79
Discharge: HOME | End: 2024-10-16
Attending: STUDENT IN AN ORGANIZED HEALTH CARE EDUCATION/TRAINING PROGRAM

## 2024-10-16 DIAGNOSIS — G44.321 INTRACTABLE CHRONIC POST-TRAUMATIC HEADACHE: ICD-10-CM

## 2024-10-16 DIAGNOSIS — S13.4XXD WHIPLASH INJURY TO NECK, SUBSEQUENT ENCOUNTER: ICD-10-CM

## 2024-10-16 PROCEDURE — 72141 MRI NECK SPINE W/O DYE: CPT

## 2024-11-01 ENCOUNTER — APPOINTMENT (OUTPATIENT)
Dept: FAMILY MEDICINE | Age: 79
End: 2024-11-01

## 2024-11-01 VITALS
HEIGHT: 57 IN | HEART RATE: 63 BPM | SYSTOLIC BLOOD PRESSURE: 125 MMHG | TEMPERATURE: 96.8 F | WEIGHT: 141.09 LBS | BODY MASS INDEX: 30.44 KG/M2 | OXYGEN SATURATION: 99 % | DIASTOLIC BLOOD PRESSURE: 71 MMHG

## 2024-11-01 DIAGNOSIS — S13.4XXD WHIPLASH INJURY TO NECK, SUBSEQUENT ENCOUNTER: ICD-10-CM

## 2024-11-01 DIAGNOSIS — Z23 NEED FOR INFLUENZA VACCINATION: Primary | ICD-10-CM

## 2024-11-01 DIAGNOSIS — R35.89 POLYURIA: ICD-10-CM

## 2024-11-01 DIAGNOSIS — F33.0 MAJOR DEPRESSIVE DISORDER, RECURRENT EPISODE, MILD (CMD): ICD-10-CM

## 2024-11-01 PROBLEM — I73.9 PAD (PERIPHERAL ARTERY DISEASE) (CMD): Status: RESOLVED | Noted: 2020-06-11 | Resolved: 2024-11-01

## 2024-11-01 LAB
APPEARANCE, POC: CLEAR
BILIRUB UR QL STRIP: NEGATIVE
COLOR UR: YELLOW
GLUCOSE UR-MCNC: NEGATIVE MG/DL
HGB UR QL STRIP: NEGATIVE
KETONES UR STRIP-MCNC: NEGATIVE MG/DL
LEUKOCYTE ESTERASE UR QL STRIP: ABNORMAL
NITRITE UR QL STRIP: NEGATIVE
PH UR: 6 [PH] (ref 5–7)
PROT UR-MCNC: NEGATIVE MG/DL
SP GR UR: 1.02 (ref 1–1.03)
UROBILINOGEN UR-MCNC: 0.2 MG/DL (ref 0–1)

## 2024-11-01 RX ORDER — METHYLPREDNISOLONE 4 MG/1
4 TABLET ORAL SEE ADMIN INSTRUCTIONS
Qty: 21 TABLET | Refills: 0 | Status: SHIPPED | OUTPATIENT
Start: 2024-11-01

## 2024-11-01 RX ORDER — TRAMADOL HYDROCHLORIDE 50 MG/1
50 TABLET ORAL EVERY 8 HOURS PRN
Qty: 30 TABLET | Refills: 0 | Status: SHIPPED | OUTPATIENT
Start: 2024-11-01

## 2024-11-01 RX ORDER — NITROFURANTOIN 25; 75 MG/1; MG/1
100 CAPSULE ORAL 2 TIMES DAILY
Qty: 10 CAPSULE | Refills: 0 | Status: SHIPPED | OUTPATIENT
Start: 2024-11-01 | End: 2024-11-06

## 2024-11-01 ASSESSMENT — PATIENT HEALTH QUESTIONNAIRE - PHQ9
CLINICAL INTERPRETATION OF PHQ2 SCORE: NO FURTHER SCREENING NEEDED
SUM OF ALL RESPONSES TO PHQ9 QUESTIONS 1 AND 2: 2
1. LITTLE INTEREST OR PLEASURE IN DOING THINGS: SEVERAL DAYS
SUM OF ALL RESPONSES TO PHQ9 QUESTIONS 1 AND 2: 0
1. LITTLE INTEREST OR PLEASURE IN DOING THINGS: NOT AT ALL
SUM OF ALL RESPONSES TO PHQ9 QUESTIONS 1 AND 2: 2
2. FEELING DOWN, DEPRESSED OR HOPELESS: NOT AT ALL
CLINICAL INTERPRETATION OF PHQ2 SCORE: NO FURTHER SCREENING NEEDED
2. FEELING DOWN, DEPRESSED OR HOPELESS: SEVERAL DAYS
SUM OF ALL RESPONSES TO PHQ9 QUESTIONS 1 AND 2: 0

## 2024-11-03 LAB — BACTERIA UR CULT: NORMAL

## 2025-01-28 ENCOUNTER — APPOINTMENT (OUTPATIENT)
Dept: FAMILY MEDICINE | Age: 80
End: 2025-01-28

## 2025-01-28 ENCOUNTER — OFFICE VISIT (OUTPATIENT)
Dept: FAMILY MEDICINE | Age: 80
End: 2025-01-28

## 2025-01-28 VITALS
TEMPERATURE: 97.7 F | WEIGHT: 138.89 LBS | DIASTOLIC BLOOD PRESSURE: 78 MMHG | HEART RATE: 67 BPM | HEIGHT: 57 IN | BODY MASS INDEX: 29.96 KG/M2 | OXYGEN SATURATION: 98 % | SYSTOLIC BLOOD PRESSURE: 139 MMHG

## 2025-01-28 DIAGNOSIS — N18.4 CKD (CHRONIC KIDNEY DISEASE) STAGE 4, GFR 15-29 ML/MIN  (CMD): ICD-10-CM

## 2025-01-28 DIAGNOSIS — H53.9 VISION CHANGES: ICD-10-CM

## 2025-01-28 DIAGNOSIS — M54.2 NECK PAIN ON LEFT SIDE: ICD-10-CM

## 2025-01-28 DIAGNOSIS — R21 RASH AND NONSPECIFIC SKIN ERUPTION: Primary | ICD-10-CM

## 2025-01-28 PROCEDURE — 3075F SYST BP GE 130 - 139MM HG: CPT | Performed by: STUDENT IN AN ORGANIZED HEALTH CARE EDUCATION/TRAINING PROGRAM

## 2025-01-28 PROCEDURE — 3078F DIAST BP <80 MM HG: CPT | Performed by: STUDENT IN AN ORGANIZED HEALTH CARE EDUCATION/TRAINING PROGRAM

## 2025-01-28 PROCEDURE — 99214 OFFICE O/P EST MOD 30 MIN: CPT | Performed by: STUDENT IN AN ORGANIZED HEALTH CARE EDUCATION/TRAINING PROGRAM

## 2025-01-28 RX ORDER — HYDROCODONE BITARTRATE AND ACETAMINOPHEN 5; 325 MG/1; MG/1
1 TABLET ORAL EVERY 6 HOURS PRN
Qty: 12 TABLET | Refills: 0 | Status: SHIPPED | OUTPATIENT
Start: 2025-01-28

## 2025-01-28 RX ORDER — CLOTRIMAZOLE AND BETAMETHASONE DIPROPIONATE 10; .64 MG/G; MG/G
1 CREAM TOPICAL 2 TIMES DAILY
Qty: 30 G | Refills: 0 | Status: SHIPPED | OUTPATIENT
Start: 2025-01-28

## 2025-01-29 ENCOUNTER — OFFICE VISIT (OUTPATIENT)
Facility: CLINIC | Age: 80
End: 2025-01-29

## 2025-01-29 VITALS
SYSTOLIC BLOOD PRESSURE: 129 MMHG | DIASTOLIC BLOOD PRESSURE: 64 MMHG | BODY MASS INDEX: 31 KG/M2 | HEART RATE: 60 BPM | WEIGHT: 140 LBS

## 2025-01-29 DIAGNOSIS — I50.32 CHRONIC DIASTOLIC CONGESTIVE HEART FAILURE (HCC): ICD-10-CM

## 2025-01-29 DIAGNOSIS — N18.4 HYPERTENSIVE KIDNEY DISEASE WITH STAGE 4 CHRONIC KIDNEY DISEASE (HCC): ICD-10-CM

## 2025-01-29 DIAGNOSIS — N25.81 SECONDARY HYPERPARATHYROIDISM OF RENAL ORIGIN (HCC): ICD-10-CM

## 2025-01-29 DIAGNOSIS — I12.9 HYPERTENSIVE KIDNEY DISEASE WITH STAGE 4 CHRONIC KIDNEY DISEASE (HCC): ICD-10-CM

## 2025-01-29 DIAGNOSIS — D63.1 ANEMIA DUE TO STAGE 3 CHRONIC KIDNEY DISEASE, UNSPECIFIED WHETHER STAGE 3A OR 3B CKD (HCC): ICD-10-CM

## 2025-01-29 DIAGNOSIS — N18.30 ANEMIA DUE TO STAGE 3 CHRONIC KIDNEY DISEASE, UNSPECIFIED WHETHER STAGE 3A OR 3B CKD (HCC): ICD-10-CM

## 2025-01-29 DIAGNOSIS — N18.30 STAGE 3 CHRONIC KIDNEY DISEASE, UNSPECIFIED WHETHER STAGE 3A OR 3B CKD (HCC): Primary | ICD-10-CM

## 2025-01-29 PROCEDURE — 1160F RVW MEDS BY RX/DR IN RCRD: CPT | Performed by: INTERNAL MEDICINE

## 2025-01-29 PROCEDURE — 99214 OFFICE O/P EST MOD 30 MIN: CPT | Performed by: INTERNAL MEDICINE

## 2025-01-29 PROCEDURE — 1126F AMNT PAIN NOTED NONE PRSNT: CPT | Performed by: INTERNAL MEDICINE

## 2025-01-29 PROCEDURE — 1159F MED LIST DOCD IN RCRD: CPT | Performed by: INTERNAL MEDICINE

## 2025-01-29 NOTE — PROGRESS NOTES
[facility]    Progress Note     Patricia Ortiz    76 y/o F with h/o HTN X20 + yrs, prediabetes, DJ, HLD, h/o CVA  and CKD here for fu  Use to take Advil for pain in past but not any more.  Bp at home 114/60's  checks BP at home lower in the morning  At night around 140's- improved ever since she increased her hydrochlorothiazide.  Reviewed labs with her and elevated cr noted since few yrs, however stable 1.6-1.9 mg/dl range    Subjective  No new issues  Bp well controlled      HISTORY:  Past Medical History:    CKD (chronic kidney disease) stage 4, GFR 15-29 ml/min (HCC)    Colon polyps    DJD (degenerative joint disease) of cervical spine    Floater, vitreous    Hyperlipidemia    Hypertension    Peripheral vascular disease    Shingles    Stroke due to occlusion of right middle cerebral artery (HCC)    Uterine prolapse    Varicose veins of both lower extremities      Past Surgical History:   Procedure Laterality Date    Cataract extraction w/  intraocular lens implant Right 09/24/2020    Done by Dr. Gene Galdamez    Cataract extraction w/  intraocular lens implant Left 10/08/2020    Done by Dr. Gene Galdamez    Colonoscopy  09/01/2016    Hc fitting and insertion of pessary  09/29/2020           Medications (Active prior to today's visit):  Current Outpatient Medications   Medication Sig Dispense Refill    amLODIPine 10 MG Oral Tab Take 1 tablet (10 mg total) by mouth daily. 90 tablet 0    carvedilol 12.5 MG Oral Tab Take 1 tablet (12.5 mg total) by mouth 2 (two) times daily with meals.      Multiple Vitamin (MULTIVITAMIN ADULT) Oral Tab Take 1 tablet by mouth daily.      clopidogrel 75 MG Oral Tab Take 1 tablet (75 mg total) by mouth daily. 90 tablet 3    hydroCHLOROthiazide 25 MG Oral Tab Take 1 tablet (25 mg total) by mouth daily. 90 tablet 1    LOSARTAN 100 MG Oral Tab TAKE 1 TABLET (100 MG TOTAL) BY MOUTH DAILY. (Patient taking differently: 0.5 tablets (50 mg total) daily.) 90 tablet 1     Cetirizine HCl (ZYRTEC ALLERGY) 10 MG Oral Cap Take by mouth As Directed.      cholecalciferol 1000 UNITS Oral Cap Take by mouth As Directed.      atorvastatin 40 MG Oral Tab Take 1 tablet (40 mg total) by mouth nightly. 90 tablet 1    triamcinolone 0.1 % External Ointment Apply 1 Application topically 2 (two) times daily. 454 g 1    albuterol (VENTOLIN HFA) 108 (90 Base) MCG/ACT Inhalation Aero Soln Inhale 2 puffs into the lungs every 6 (six) hours as needed for Wheezing (cough) 1 each 0    gabapentin 100 MG Oral Cap 1-3 tabs nightly 270 capsule 1    mometasone furoate 50 MCG/ACT Nasal Suspension 2 spray(s)      Vitamin B-12 1000 MCG Oral Tab Take 1 tablet (1,000 mcg total) by mouth daily. 30 tablet 3    Saline Nasal Spray (SALINE MIST) 0.65 % Nasal Solution 1 spray by Nasal route as needed for congestion.         Allergies:  Allergies[1]      ROS:     Constitutional:  Negative for decreased activity, fever, irritability and lethargy  ENMT:  Negative for ear drainage, hearing loss and nasal drainage  Eyes:  Negative for eye discharge and vision loss  Cardiovascular:  Negative for chest pain, sob  Respiratory:  Negative for cough, dyspnea and wheezing  Gastrointestinal:  Negative for abdominal pain, constipation  Genitourinary:  Negative for dysuria and hematuria  Endocrine:  Negative for abnormal sleep patterns  Hema/Lymph:  Negative for easy bleeding and easy bruising  Integumentary:  Negative for pruritus and rash  Musculoskeletal:  Negative for bone/joint symptoms  Neurological:  Negative for gait disturbance  Psychiatric:  Negative for inappropriate interaction and psychiatric symptoms      Vitals:    01/29/25 1352   BP: 129/64   Pulse: 60       PHYSICAL EXAM:   Constitutional: appears well hydrated alert and responsive   Head/Face: normocephalic  Eyes/Vision: normal extraocular motion is intact  Nose/Mouth/Throat:mucous membranes are moist   Neck/Thyroid: neck is supple without adenopathy  Respiratory:  lungs  are clear to auscultation bilaterally  Cardiovascular: regular rate and rhythm   Abdomen: soft, non-tender, non-distended, BS normal  Skin/Hair: no unusual rashes present, no abnormal bruising noted  Musculoskeletal: no deformities  Extremities: no edema  Neurological:  Grossly normal      Lab Results   Component Value Date     08/22/2023     (H) 06/14/2023     07/26/2022    K 3.8 08/22/2023    K 4.6 06/14/2023    K 4.2 07/26/2022     08/22/2023     (H) 06/14/2023     07/26/2022    CO2 31.0 08/22/2023    CO2 30.0 06/14/2023    CO2 25.0 07/26/2022    BUN 24 (H) 08/22/2023    BUN 25 (H) 06/14/2023    BUN 34 (H) 07/26/2022    CREATSERUM 1.49 (H) 08/22/2023    CREATSERUM 1.38 (H) 06/14/2023    CREATSERUM 1.72 (H) 07/26/2022    CA 8.8 08/22/2023    CA 9.0 06/14/2023    CA 9.5 07/26/2022    EGFRCR 36 (L) 08/22/2023    EGFRCR 39 (L) 06/14/2023    ALB 3.6 08/22/2023    ALB 3.4 06/14/2023    ALB 3.9 07/26/2022    PHOS 2.7 08/22/2023    PHOS 3.1 06/14/2023    PTH 45.3 08/22/2023    PTH 63.0 06/14/2023     Labs   2/2023 CR 1.7 mg/dl, eGFR 30 ml/min  5/2023 cr 1.6 mg/dl, eGFR 33  Hep c NR    Care everywhere    1/23/25 cr 1.4 mg/dl, eGFR 36 ml/min, ipth 26, phosp 3.7, UPC 73,      ASSESSMENT/PLAN:   Assessment   1. Stage 3 chronic kidney disease, unspecified whether stage 3a or 3b CKD (HCC)    2. Hypertensive kidney disease with stage 4 chronic kidney disease (HCC)    3. Secondary hyperparathyroidism of renal origin (HCC)    4. Chronic diastolic congestive heart failure (HCC)    5. Anemia due to stage 3 chronic kidney disease, unspecified whether stage 3a or 3b CKD (HCC)         1. 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  [place] UA with no RBCs  UMCR unremarkable  1/26/2023: Renal art duplex small kidneys with cortical thinning and lobulated contours, no evidence of DARYL  6/2020: Echo : wall thickness noted, EF 55-50% and Grade 2 DD   Cr reviewed with pt and cr  ranges 1.6-1.9 mg/dl range overall stable since 2018   Discussed avoiding NSAIDS, PPI,   Discussed low salt diet and exercise  Labs reviewed at care everywhere cr has been stable fortunately     2 HTN with CKD  On Arb, hydrochlorothiazide, amlodipine and coreg  No DARYL on doppler US  BP improved after increasing hydrochlorothiazide.  She will bring her BP log next appt      3 secondary HPTH  Vit d acceptable  Ipth 63, phosp acceptable    4 CHFpEF  Grade 2 DD  Compensated    5 anemia  Hgb ok     PLAN  Renal function stable - cr 1.3 -1.6 mg/dl , [facility] labs reviewed  Hydrate well bc of hypernatremia  Low salt diet  Avoid nsaids    Pt prefers to fu once a year. Knows to call me if kidney fx worsens           Orders This Visit:  No orders of the defined types were placed in this encounter.      Meds This Visit:  Requested Prescriptions      No prescriptions requested or ordered in this encounter       Imaging & Referrals:  None       Veronique Palmer MD  1/29/2025            [1]   Allergies  Allergen Reactions    Other OTHER (SEE COMMENTS) and UNKNOWN     Unknown. Pt reports having an allergic reaction \"a long, long time ago\"    Latex ITCHING    Sulfa Antibiotics UNKNOWN    Duloxetine DIARRHEA    Latex ITCHING

## 2025-02-03 ENCOUNTER — APPOINTMENT (OUTPATIENT)
Dept: FAMILY MEDICINE | Age: 80
End: 2025-02-03

## 2025-02-13 DIAGNOSIS — F33.0 MAJOR DEPRESSIVE DISORDER, RECURRENT EPISODE, MILD (CMD): ICD-10-CM

## 2025-05-02 ENCOUNTER — TELEPHONE (OUTPATIENT)
Dept: INTERNAL MEDICINE | Age: 80
End: 2025-05-02

## 2025-05-07 ENCOUNTER — APPOINTMENT (OUTPATIENT)
Dept: NEUROLOGY | Age: 80
End: 2025-05-07

## 2025-05-07 VITALS
HEART RATE: 65 BPM | BODY MASS INDEX: 31.2 KG/M2 | SYSTOLIC BLOOD PRESSURE: 153 MMHG | HEIGHT: 57 IN | DIASTOLIC BLOOD PRESSURE: 75 MMHG | WEIGHT: 144.62 LBS

## 2025-05-07 DIAGNOSIS — M54.81 OCCIPITAL NEURALGIA OF LEFT SIDE: Primary | ICD-10-CM

## 2025-05-07 PROCEDURE — 3077F SYST BP >= 140 MM HG: CPT | Performed by: STUDENT IN AN ORGANIZED HEALTH CARE EDUCATION/TRAINING PROGRAM

## 2025-05-07 PROCEDURE — 99205 OFFICE O/P NEW HI 60 MIN: CPT | Performed by: STUDENT IN AN ORGANIZED HEALTH CARE EDUCATION/TRAINING PROGRAM

## 2025-05-07 PROCEDURE — 3078F DIAST BP <80 MM HG: CPT | Performed by: STUDENT IN AN ORGANIZED HEALTH CARE EDUCATION/TRAINING PROGRAM

## 2025-05-07 RX ORDER — DEXAMETHASONE 4 MG/1
4 TABLET ORAL
Qty: 3 TABLET | Refills: 0 | Status: SHIPPED | OUTPATIENT
Start: 2025-05-07 | End: 2025-05-10

## 2025-06-01 ASSESSMENT — ENCOUNTER SYMPTOMS: PAIN FREQUENCY: INTERMITTENT

## 2025-06-02 ENCOUNTER — HOSPITAL ENCOUNTER (OUTPATIENT)
Dept: PHYSICAL MEDICINE AND REHAB | Age: 80
Discharge: STILL ADMITTED OR ADMINISTRATIVE | End: 2025-06-02
Attending: STUDENT IN AN ORGANIZED HEALTH CARE EDUCATION/TRAINING PROGRAM

## 2025-06-02 PROCEDURE — 97161 PT EVAL LOW COMPLEX 20 MIN: CPT

## 2025-06-02 PROCEDURE — 97110 THERAPEUTIC EXERCISES: CPT

## 2025-06-02 ASSESSMENT — ENCOUNTER SYMPTOMS
SUBJECTIVE PAIN PROGRESSION: IMPROVED
PAIN SCALE AT HIGHEST: 4
ALLEVIATING FACTORS: OVER-THE-COUNTER MEDICATION
PAIN SEVERITY NOW: 4
PAIN SCALE AT LOWEST: 3
ALLEVIATING FACTORS: PRESCRIPTION MEDICATIONS

## 2025-06-03 ASSESSMENT — ENCOUNTER SYMPTOMS
QUALITY: DISCOMFORT
QUALITY: ACHE

## 2025-06-13 ENCOUNTER — APPOINTMENT (OUTPATIENT)
Dept: PHYSICAL MEDICINE AND REHAB | Age: 80
End: 2025-06-13
Attending: STUDENT IN AN ORGANIZED HEALTH CARE EDUCATION/TRAINING PROGRAM

## 2025-07-09 ENCOUNTER — APPOINTMENT (OUTPATIENT)
Dept: FAMILY MEDICINE | Age: 80
End: 2025-07-09

## 2025-07-09 ENCOUNTER — LAB SERVICES (OUTPATIENT)
Dept: FAMILY MEDICINE | Age: 80
End: 2025-07-09

## 2025-07-09 VITALS
DIASTOLIC BLOOD PRESSURE: 69 MMHG | TEMPERATURE: 96.5 F | BODY MASS INDEX: 30.92 KG/M2 | OXYGEN SATURATION: 97 % | SYSTOLIC BLOOD PRESSURE: 130 MMHG | HEIGHT: 57 IN | WEIGHT: 143.3 LBS | HEART RATE: 59 BPM

## 2025-07-09 DIAGNOSIS — R73.03 PREDIABETES: ICD-10-CM

## 2025-07-09 DIAGNOSIS — R53.83 OTHER FATIGUE: ICD-10-CM

## 2025-07-09 DIAGNOSIS — H60.311 ACUTE DIFFUSE OTITIS EXTERNA OF RIGHT EAR: ICD-10-CM

## 2025-07-09 DIAGNOSIS — E55.9 VITAMIN D DEFICIENCY: ICD-10-CM

## 2025-07-09 DIAGNOSIS — N93.9 VAGINAL SPOTTING: ICD-10-CM

## 2025-07-09 DIAGNOSIS — N18.32 STAGE 3B CHRONIC KIDNEY DISEASE  (CMD): Primary | ICD-10-CM

## 2025-07-09 DIAGNOSIS — N18.32 STAGE 3B CHRONIC KIDNEY DISEASE  (CMD): ICD-10-CM

## 2025-07-09 DIAGNOSIS — R79.9 ABNORMAL FINDING OF BLOOD CHEMISTRY, UNSPECIFIED: ICD-10-CM

## 2025-07-09 DIAGNOSIS — I50.32 CHRONIC DIASTOLIC CONGESTIVE HEART FAILURE  (CMD): ICD-10-CM

## 2025-07-09 LAB
APPEARANCE, POC: ABNORMAL
BILIRUB UR QL STRIP: ABNORMAL
COLOR UR: YELLOW
GLUCOSE UR-MCNC: NEGATIVE MG/DL
HGB UR QL STRIP: ABNORMAL
KETONES UR STRIP-MCNC: ABNORMAL MG/DL
LEUKOCYTE ESTERASE UR QL STRIP: ABNORMAL
NITRITE UR QL STRIP: NEGATIVE
PH UR: 6 [PH] (ref 5–7)
PROT UR-MCNC: ABNORMAL MG/DL
SP GR UR: 1.02 (ref 1–1.03)
UROBILINOGEN UR-MCNC: 1 MG/DL (ref 0–1)

## 2025-07-09 RX ORDER — PHENOL 1.4 %
1 AEROSOL, SPRAY (ML) MUCOUS MEMBRANE DAILY
COMMUNITY

## 2025-07-09 RX ORDER — CIPROFLOXACIN AND DEXAMETHASONE 3; 1 MG/ML; MG/ML
4 SUSPENSION/ DROPS AURICULAR (OTIC) 2 TIMES DAILY
Qty: 7.5 ML | Refills: 0 | Status: SHIPPED | OUTPATIENT
Start: 2025-07-09 | End: 2025-07-16

## 2025-07-09 RX ORDER — LEVOCETIRIZINE DIHYDROCHLORIDE 5 MG/1
5 TABLET, FILM COATED ORAL EVERY EVENING
COMMUNITY
Start: 2025-03-28

## 2025-07-09 RX ORDER — MAGNESIUM 30 MG
30 TABLET ORAL DAILY
COMMUNITY

## 2025-07-09 ASSESSMENT — PATIENT HEALTH QUESTIONNAIRE - PHQ9
1. LITTLE INTEREST OR PLEASURE IN DOING THINGS: NOT AT ALL
SUM OF ALL RESPONSES TO PHQ9 QUESTIONS 1 AND 2: 0
SUM OF ALL RESPONSES TO PHQ9 QUESTIONS 1 AND 2: 0
2. FEELING DOWN, DEPRESSED OR HOPELESS: NOT AT ALL
CLINICAL INTERPRETATION OF PHQ2 SCORE: NO FURTHER SCREENING NEEDED

## 2025-07-10 LAB
25(OH)D3+25(OH)D2 SERPL-MCNC: 36.5 NG/ML (ref 30–100)
ALBUMIN SERPL-MCNC: 4.1 G/DL (ref 3.4–5)
ALBUMIN/GLOB SERPL: 1.2 {RATIO} (ref 1–2.4)
ALP SERPL-CCNC: 95 UNITS/L (ref 45–117)
ALT SERPL-CCNC: 31 UNITS/L
ANION GAP SERPL CALC-SCNC: 10 MMOL/L (ref 7–19)
AST SERPL-CCNC: 21 UNITS/L
BASOPHILS # BLD: 0.1 K/MCL (ref 0–0.3)
BASOPHILS NFR BLD: 1 %
BILIRUB SERPL-MCNC: 0.8 MG/DL (ref 0.2–1)
BUN SERPL-MCNC: 33 MG/DL (ref 6–20)
BUN/CREAT SERPL: 19 (ref 7–25)
CALCIUM SERPL-MCNC: 9.6 MG/DL (ref 8.4–10.2)
CHLORIDE SERPL-SCNC: 103 MMOL/L (ref 97–110)
CO2 SERPL-SCNC: 30 MMOL/L (ref 21–32)
CREAT SERPL-MCNC: 1.76 MG/DL (ref 0.51–0.95)
CREAT UR-MCNC: 128 MG/DL
CRP SERPL-MCNC: <5 MG/L
DEPRECATED RDW RBC: 47.7 FL (ref 39–50)
EGFRCR SERPLBLD CKD-EPI 2021: 29 ML/MIN/{1.73_M2}
EOSINOPHIL # BLD: 0.4 K/MCL (ref 0–0.5)
EOSINOPHIL NFR BLD: 6 %
ERYTHROCYTE [DISTWIDTH] IN BLOOD: 13.9 % (ref 11–15)
ERYTHROCYTE [SEDIMENTATION RATE] IN BLOOD BY WESTERGREN METHOD: 10 MM/HR (ref 0–20)
FASTING DURATION TIME PATIENT: ABNORMAL H
FERRITIN SERPL-MCNC: 36 NG/ML (ref 8–252)
FOLATE SERPL-MCNC: >24 NG/ML
GLOBULIN SER-MCNC: 3.3 G/DL (ref 2–4)
GLUCOSE SERPL-MCNC: 79 MG/DL (ref 70–99)
HBA1C MFR BLD: 5.8 % (ref 4.5–5.6)
HCT VFR BLD CALC: 37.8 % (ref 36–46.5)
HGB BLD-MCNC: 12.8 G/DL (ref 12–15.5)
IMM GRANULOCYTES # BLD AUTO: 0 K/MCL (ref 0–0.2)
IMM GRANULOCYTES # BLD: 0 %
IRON SATN MFR SERPL: 25 % (ref 15–45)
IRON SERPL-MCNC: 82 MCG/DL (ref 50–170)
LYMPHOCYTES # BLD: 1.1 K/MCL (ref 1–4)
LYMPHOCYTES NFR BLD: 14 %
MCH RBC QN AUTO: 31.4 PG (ref 26–34)
MCHC RBC AUTO-ENTMCNC: 33.9 G/DL (ref 32–36.5)
MCV RBC AUTO: 92.9 FL (ref 78–100)
MICROALBUMIN UR-MCNC: 2.26 MG/DL
MICROALBUMIN/CREAT UR: 17.7 MG/G
MONOCYTES # BLD: 0.9 K/MCL (ref 0.3–0.9)
MONOCYTES NFR BLD: 11 %
NEUTROPHILS # BLD: 5.1 K/MCL (ref 1.8–7.7)
NEUTROPHILS NFR BLD: 68 %
NRBC BLD MANUAL-RTO: 0 /100 WBC
PLATELET # BLD AUTO: 183 K/MCL (ref 140–450)
POTASSIUM SERPL-SCNC: 4.5 MMOL/L (ref 3.4–5.1)
PROT SERPL-MCNC: 7.4 G/DL (ref 6.4–8.2)
RBC # BLD: 4.07 MIL/MCL (ref 4–5.2)
SODIUM SERPL-SCNC: 138 MMOL/L (ref 135–145)
TIBC SERPL-MCNC: 327 MCG/DL (ref 250–450)
VIT B12 SERPL-MCNC: 1518 PG/ML (ref 211–911)
WBC # BLD: 7.6 K/MCL (ref 4.2–11)

## 2025-07-11 ENCOUNTER — TELEPHONE (OUTPATIENT)
Dept: FAMILY MEDICINE | Age: 80
End: 2025-07-11

## 2025-07-11 ENCOUNTER — RESULTS FOLLOW-UP (OUTPATIENT)
Dept: FAMILY MEDICINE | Age: 80
End: 2025-07-11

## 2025-07-14 ENCOUNTER — TELEPHONE (OUTPATIENT)
Dept: FAMILY MEDICINE | Age: 80
End: 2025-07-14

## 2025-07-24 ENCOUNTER — HOSPITAL ENCOUNTER (OUTPATIENT)
Dept: ULTRASOUND IMAGING | Age: 80
Discharge: HOME | End: 2025-07-24
Attending: STUDENT IN AN ORGANIZED HEALTH CARE EDUCATION/TRAINING PROGRAM

## 2025-07-24 DIAGNOSIS — N93.9 VAGINAL SPOTTING: ICD-10-CM

## 2025-07-24 PROCEDURE — 76856 US EXAM PELVIC COMPLETE: CPT

## 2025-07-31 ENCOUNTER — APPOINTMENT (OUTPATIENT)
Dept: OBGYN | Age: 80
End: 2025-07-31
Attending: STUDENT IN AN ORGANIZED HEALTH CARE EDUCATION/TRAINING PROGRAM

## 2025-08-05 ENCOUNTER — PATIENT OUTREACH (OUTPATIENT)
Dept: CASE MANAGEMENT | Age: 80
End: 2025-08-05

## 2025-08-20 ENCOUNTER — APPOINTMENT (OUTPATIENT)
Dept: OBGYN | Age: 80
End: 2025-08-20

## 2025-08-21 ENCOUNTER — APPOINTMENT (OUTPATIENT)
Dept: OBGYN | Age: 80
End: 2025-08-21
Attending: STUDENT IN AN ORGANIZED HEALTH CARE EDUCATION/TRAINING PROGRAM

## 2025-08-21 VITALS
OXYGEN SATURATION: 98 % | HEART RATE: 59 BPM | TEMPERATURE: 97 F | DIASTOLIC BLOOD PRESSURE: 76 MMHG | SYSTOLIC BLOOD PRESSURE: 179 MMHG

## 2025-08-21 DIAGNOSIS — T83.89XA VAGINAL EROSION SECONDARY TO PESSARY USE, INITIAL ENCOUNTER (CMD): Primary | ICD-10-CM

## 2025-08-21 DIAGNOSIS — N89.8 VAGINAL EROSION SECONDARY TO PESSARY USE, INITIAL ENCOUNTER (CMD): Primary | ICD-10-CM

## 2025-08-21 RX ORDER — ESTRADIOL 0.1 MG/G
1 CREAM VAGINAL DAILY
Qty: 14 G | Refills: 0 | Status: SHIPPED | OUTPATIENT
Start: 2025-08-21 | End: 2025-09-04

## (undated) DIAGNOSIS — R35.0 FREQUENCY OF MICTURITION: ICD-10-CM

## (undated) DIAGNOSIS — Z46.89 PESSARY MAINTENANCE: Primary | ICD-10-CM

## (undated) DIAGNOSIS — Z86.73 HISTORY OF STROKE: Primary | ICD-10-CM

## (undated) NOTE — LETTER
June 22, 2023      No Recipients     Patient: Bina Minor   YOB: 1945   Date of Visit: 6/22/2023       Dear Dr. Tramaine Wells Recipients: Thank you for referring Negrita Britt to me for evaluation. Here is my assessment and plan of care:    Bina Minor is a 68year old female. HPI:     HPI    Pt in today for a complete eye exam. Pt's last eye exam was in 2020 with Dr. Jeong Husbands and was last seen in 2019 with AARON. Pt mentioned she had cataract surgery in 2020 by Dr. Cristal Haley and states she occasionally only wears reading glasses (forgot to bring them today). Last edited by Bogdan Moreno O.T. on 6/22/2023 11:00 AM.        Patient History:  Past Medical History:   Diagnosis Date    CKD (chronic kidney disease) stage 4, GFR 15-29 ml/min ([facility])     Colon polyps     DJD (degenerative joint disease) of cervical spine     Floater, vitreous 11/12/2015    Hyperlipidemia     Hypertension     Peripheral vascular disease ([address]     Shingles 2020    Stroke due to occlusion of right middle cerebral artery (HCC)     Uterine prolapse     Varicose veins of both lower extremities        Surgical History: iBna Minor has a past surgical history that includes Cataract extraction w/  intraocular lens implant (Right, 09/24/2020) (Done by Dr. Jeong Husbands); Cataract extraction w/  intraocular lens implant (Left, 10/08/2020) (Done by Dr. Jeong Husbands); hc fitting and insertion of pessary (09/29/2020); and colonoscopy (09/01/2016).     Family History   Problem Relation Age of Onset    Diabetes Mother     Heart Disorder Father     Glaucoma Neg     Macular degeneration Neg        Social History:   Social History     Socioeconomic History    Marital status: Legally     Number of children: 4   Occupational History    Occupation: housekeeping     Comment: retired   Tobacco Use    Smoking status: Former     Packs/day: 1.00     Years: 9.00     Pack years: 9.00     Types: Cigarettes Quit date: 1983     Years since quittin.4    Smokeless tobacco: Never   Vaping Use    Vaping Use: Never used   Substance and Sexual Activity    Alcohol use: No     Alcohol/week: 0.0 standard drinks of alcohol    Drug use: No    Sexual activity: Not Currently     Partners: Male   Other Topics Concern    Caffeine Concern Yes     Comment: 1 cup of decaf coffee once a week    Exercise No    Bike Helmet Yes     Comment: Active   Social History Narrative    The patient does not use an assistive device. .      The patient does live in a home with stairs. Medications:  Current Outpatient Medications   Medication Sig Dispense Refill    carvedilol 12.5 MG Oral Tab Take 1 tablet (12.5 mg total) by mouth 2 (two) times daily with meals. Multiple Vitamin (MULTIVITAMIN ADULT) Oral Tab Take 1 tablet by mouth daily. clopidogrel 75 MG Oral Tab Take 1 tablet (75 mg total) by mouth daily. 90 tablet 3    hydroCHLOROthiazide 25 MG Oral Tab Take 1 tablet (25 mg total) by mouth daily. 90 tablet 1    LOSARTAN 100 MG Oral Tab TAKE 1 TABLET (100 MG TOTAL) BY MOUTH DAILY. (Patient taking differently: 0.5 tablets (50 mg total) 2 (two) times daily.) 90 tablet 1    AMLODIPINE 10 MG Oral Tab TAKE 1 TABLET (10 MG TOTAL) BY MOUTH ONCE DAILY. 90 tablet 1    pantoprazole 40 MG Oral Tab EC Take 1 tablet (40 mg total) by mouth daily. Cetirizine HCl (ZYRTEC ALLERGY) 10 MG Oral Cap Take by mouth As Directed. cholecalciferol 1000 UNITS Oral Cap Take by mouth As Directed. atorvastatin 40 MG Oral Tab Take 1 tablet (40 mg total) by mouth nightly. 90 tablet 1    triamcinolone 0.1 % External Ointment Apply 1 Application topically 2 (two) times daily.  454 g 1    albuterol (VENTOLIN HFA) 108 (90 Base) MCG/ACT Inhalation Aero Soln Inhale 2 puffs into the lungs every 6 (six) hours as needed for Wheezing (cough) 1 each 0    gabapentin 100 MG Oral Cap 1-3 tabs nightly 270 capsule 1    Levocetirizine Dihydrochloride 5 MG Oral Tab Take 1 tablet (5 mg total) by mouth nightly. (Patient not taking: Reported on 5/16/2023) 90 tablet 1    Butalbital-APAP-Caffeine -40 MG Oral Tab  (Patient not taking: Reported on 12/5/2022)      clotrimazole-betamethasone 1-0.05 % External Cream Apply 1 Application topically 2 (two) times daily as needed. 15 g 3    mometasone furoate 50 MCG/ACT Nasal Suspension 2 spray(s)      Vitamin B-12 1000 MCG Oral Tab Take 1 tablet (1,000 mcg total) by mouth daily. 30 tablet 3    Saline Nasal Spray (SALINE MIST) 0.65 % Nasal Solution 1 spray by Nasal route as needed for congestion. Allergies: Other                   OTHER (SEE COMMENTS), UNKNOWN    Comment:Unknown.  Pt reports having an allergic reaction \"a             long, long time ago\"  Latex                   ITCHING  Sulfa Antibiotics       UNKNOWN  Duloxetine              DIARRHEA  Latex                   ITCHING    ROS:     ROS    Positive for: Eyes  Negative for: Constitutional, Gastrointestinal, Neurological, Skin, Genitourinary, Musculoskeletal, HENT, Endocrine, Cardiovascular, Respiratory, Psychiatric, Allergic/Imm, Heme/Lymph  Last edited by Samuel Sierra OT on 6/22/2023 10:35 AM.          PHYSICAL EXAM:     Base Eye Exam       Visual Acuity (Snellen - Linear)         Right Left    Dist sc 20/25 -3 20/20 -2    Near cc 20/20 20/20   NVA checked with +3.00 trial lens             Tonometry ([facility], 10:44 AM)         Right Left    Pressure 10 10              Pupils         Pupils    Right PERRL    Left PERRL              Visual Fields         Left Right     Full Full              Extraocular Movement         Right Left     Full, Ortho Full, Ortho              Dilation       Both eyes: 1.0% Mydriacyl and 2.5% Robin Synephrine @ 10:44 AM                  Slit Lamp and Fundus Exam       Slit Lamp Exam         Right Left    Lids/Lashes Dermatochalasis, Meibomian gland dysfunction, Papilloma below RLL centrally Dermatochalasis, Meibomian gland dysfunction Conjunctiva/Sclera Temp pinguecula, nasnal conj cyst Temp pinguecula    Cornea Clear Clear    Anterior Chamber Deep and quiet Deep and quiet    Iris Normal Normal    Lens PC IOL, trace opacity PC IOL, trace opacity    Vitreous Vitreous floaters Clear              Fundus Exam         Right Left    Disc Good rim Good rim    C/D Ratio 0.5 0.5    Macula ERM and pseudohole below fovea Normal    Vessels Normal Normal    Periphery Normal Normal                  Refraction       Wearing Rx         Sphere Cylinder    Right +3.00 Sphere    Left +3.00 Sphere      Type: Forgot ding only              Manifest Refraction (Auto)         Sphere Cylinder Axis    Right -1.50 +1.75 175    Left [place] +1.00 165   Pt declines refraction, happy with OCT readers                     ASSESSMENT/PLAN:     Diagnoses and Plan:     Pseudophakia of both eyes  No treatment. Floaters, right   There is no evidence of retinal pathology. All signs and symptoms of retinal detachment/tears explained in detail. Patient instructed to call the office if they experience increase in floaters, increase in flashes of light, loss of vision or curtain or veil effect. Epiretinal membrane (ERM) of right eye  Will continue to watch. No orders of the defined types were placed in this encounter. Meds This Visit:  Requested Prescriptions      No prescriptions requested or ordered in this encounter        Follow up instructions:  Return in about 1 year (around 6/22/2024) for EE.    6/22/2023  Scribed by: Rommel Dobson MD      If you have questions, please do not hesitate to call me. I look forward to following Rosalee Babb along with you.     Sincerely,        Rommel Dobson MD        CC:   No Recipients    Document electronically generated by: Rommel Dobson MD

## (undated) NOTE — LETTER
July 28, 2020     David Boogie Diana  [address]      Dear Kareen Carterroom:    Below are the results from your recent visit:    Aden Son is normal     Repeat in 12 months     QUINCY Molina   Working with [facility]     Result next mammogram has been entered into a reminder system.        Patient received a discharge summary from the technologist after completion of exam.     Breast marker legend used on images    Triangle = Palpable lump  [place] = Skin tag or mole  BB = Nipple

## (undated) NOTE — LETTER
Date: 9/13/2017     Dx: Cervical radiculopathy (M54.12)         Authorized # of Visits:  8       Referring MD: No ref.  provider found  Next MD visit: none scheduled    Medication Changes since last visit?: No    Subjective: Patient states that she is painf

## (undated) NOTE — LETTER
Dear Dr. Tawana Orta    This letter is to inform you that Sasha Man has been attending Physical Therapy with me. See below for my most recent plan of care.        Patient Name: Sasha Man, : 1945, MRN: [id number]   Date:  2018  ELOISE Sung Noel PT, DPT, cert MDT      Electronically signed by therapist: Sung Noel PT

## (undated) NOTE — LETTER
9/17/2019              [address] [place] [id number]         To whom it may concern,      Ze Krabbe is under my medical care. She suffers from spinal stenosis as well as degenerative osteoarthritis.   These condit

## (undated) NOTE — Clinical Note
June 18, 2017     [address] [facility]      Dear Nayely Hernandez:    Below are the results from your recent visit:    Resulted Orders   COMP METABOLIC PANEL (14)   Result Value Ref Range    Glucose 103 (H) 70-99 mg/dL    Sodium 142 The kidney and liver function tests are all normal.    The total cholesterol and LDL cholesterol are both too elevated. You need to work on diet and exercise. We should recheck these numbers in 3 months.   If things are not improving, then we should consi

## (undated) NOTE — LETTER
Patricia Ortiz     [address]   [facility] [address]      Anitra Gomez,     This is the [facility], office of Dr. Thad Navarrete    Thank you for putting your trust in [facility].  Our goal is to deliver the highest quality healthcare and an exceptional patient experience. Upon reviewing of your medical record shows you are due for the following:       Annual Medicare Physical Exam  Colonoscopy          Please call [phone] to schedule your appointment or schedule online via [employer].     If you changed to a new provider at another facility, please notify the clinic to update your records.    If you had any recent testing at another facility, please have your results faxed to our office at [phone].           Thank you and have a great day!

## (undated) NOTE — LETTER
January 13, 2020     Angelita Ortiz  [address]      Dear Ericka Martinez:    Below are the results from your recent visit:    Resulted Orders   XR FEMUR MIN 2 VIEWS LEFT (CPT=73552)    Narrative    PROCEDURE: XR FEMUR MIN 2

## (undated) NOTE — MR AVS SNAPSHOT
[address] [address]  [phone]               Thank you for choosing us for your health care visit with Barb Velasquez MD.  We are glad to serve you and happy to provide you with this summar Order:  Neuro - Internal    Linzie Kayser, MD [address]   [address] [address]   Phone:  [phone]   Fax:  [phone]             Vallarie Bernheim, MD   [address] [place] [address]   [address] [address]   Phone:  [phone]   Fax:  [phone] Your physician has referred you to a specialist.  Your physician or the clinic staff will provide you with the phone number you should call to schedule your appointment.        Juma Leon, clinic staff will provide you with the phone number you should call to schedule your appointment.      If you are confident that your benefit plan will not require a referral or authorization, such as [place], please feel free to schedule your abundio Patient must be diagnosed with one of the following:   • Hypertension   • Dyslipidemia   • Obesity (BMI ³30 kg/m2)   • Previous elevated impaired FBS or GTT   … or any two of the following:   • Overweight (BMI ³25 but <30)   • Family history of diabetes uncomfortable but covered  Covered but uncomfortable   Glaucoma Screening      Ophthalmology Visit   Covered annually for Diabetics, people with Glaucoma family history,   Americans over age 48   Americans over age 72 No flowsheet data found Persons who live in the same house as a HepB virus carrier   Homosexual men   Illicit injectable drug abusers     Tetanus Toxoid- Only covered with a cut with metal- TD and TDaP Not covered by Medicare Part B) No orders found for this or any previous visit Take 1 tablet (25 mg total) by mouth daily. Commonly known as:  HYDRODIURIL           losartan 100 MG Tabs   TAKE 1 TABLET BY MOUTH EVERY DAY   Commonly known as:  COZAAR           MULTI-VITAMIN/MINERALS Tabs   Take 1 tablet by mouth daily.            Neb

## (undated) NOTE — LETTER
[address] [facility]  [address], [address] [place] [address]  [address]  [phone]        Dear Neel Coleman MD,      I had the pleasure of seeing your patient, Pavithra Alba on 10/24/2017.      Below please find a rosa Saline Nasal Spray (SALINE MIST) 0.65 % Nasal Solution 1 spray by Nasal route as needed for congestion. Disp:  Rfl:    Nebivolol HCl (BYSTOLIC) 10 MG Oral Tab Take 20 mg by mouth daily.  Disp:  Rfl:    Multiple Vitamins-Minerals (MULTI-VITAMIN/MINERALS) Jason Butterfield NEURO: As in HPI    EXAM:     Vitals:  /72 (BP Location: Left arm, Patient Position: Sitting, Cuff Size: adult)   Pulse 62   Resp 16   Ht 54\"   Wt 138 lb   BMI 33.27 kg/m²  . Repeat blood pressure is 140/80. General appearance: In no distress.   Mi the EMG has been completed. Return in about 3 months (around 1/24/2018). Sixto Gaston.  Lillian Rosales MD

## (undated) NOTE — LETTER
8/1/2019    [address] [facility]            Dear Allyn Olmedo,      Our records indicate that you are due for an appointment for a Colonoscopy in September 2019, or shortly there after, with Juaquin Sheikh MD

## (undated) NOTE — LETTER
January 17, 2019     [address]    Dear Andreea Sorto:    Below are the results from your recent visit:    Resulted Orders   COMP METABOLIC PANEL (14)   Result Value Ref Range    Glucose 111 (H) 70 - 99 mg/dL    Sodium 141 136 Basophil Absolute 0.1 0.0 - 0.2 K/UL     The blood cell count is normal. There is no evidence of anemia or infection. The blood sugar (glucose) level is 111. Normal is less than 100 and diabetes begins at 126. There is no evidence of diabetes.     The e

## (undated) NOTE — LETTER
[address] [facility], [address] [place] [address]. [address]  [phone]        Dear Esmer Shipley MD,      I had the pleasure of seeing your patient, Sasha Man on 7/14/2017.      Below please find a sum Cetirizine HCl (ZYRTEC ALLERGY OR) Take by mouth.  Disp:  Rfl:    TRIAMCINOLONE ACETONIDE 0.1 % External Ointment APPLY TO THE AFFECTED AREA TWICE DAILY FOR 14 DAYS Disp: 454 g Rfl: 0   Saline Nasal Spray (SALINE MIST) 0.65 % Nasal Solution 1 spray by Nasal CARDIOVASCULAR: denies chest pain or PRINCE; no palpitations   GI: denies nausea, vomiting, constipation, diarrhea; no heartburn  GENITAL/: no dysuria, urgency or frequency; no nocturia  MUSCULOSKELETAL: Neck and shoulder pain.   PSYCHE:no depression or anxi She has had numbness in the fourth and fifth digits of both hands, with increasing neck and shoulder pain, raising the probability this is a radiculopathy. She had previous bilateral carpal tunnel surgery in the 70s.   Laboratory studies include normal B12

## (undated) NOTE — LETTER
[address], [address] [facility]  [address] [facility], [address]  [address]  [phone]        Dear Camila Lindo MD,      I had the pleasure of seeing your patient, Ruthann Contreras on 1/24/2018.      Below pl LOSARTAN 100 MG Oral Tab TAKE 1 TABLET BY MOUTH EVERY DAY Disp: 90 tablet Rfl: 0   Vitamin B-12 1000 MCG Oral Tab Take 1 tablet (1,000 mcg total) by mouth daily.  Disp: 30 tablet Rfl: 3   aspirin EC 81 MG Oral Tab EC Take 1 tablet (81 mg total) by mouth lori SKIN: denies any unusual skin lesions or rashes  EYES: no visual complaints or deficits  HEENT: denies nasal congestion, sinus pain or sore throat; hearing loss negative  RESPIRATORY: denies shortness of breath, wheezing or cough   CARDIOVASCULAR: denies c lower cervical radiculopathy. She has no motor findings on examination, and has had only minimal neck discomfort. We discussed treatment options, and currently she feels she is doing well, and was not interested in further treatment.   We discussed physic

## (undated) NOTE — LETTER
November 11, 2020     Eric Ortiz  [address]      Dear Alexsandra Chairez:    Below are the results from your recent visit:    Resulted Orders   COMP METABOLIC PANEL (14)   Result Value Ref Range    Glucose 103 (H) 70 - 99 m Lymphocyte Absolute 0.80 (L) 1.00 - 4.00 x10(3) uL    Monocyte Absolute 0.54 0.10 - 1.00 x10(3) uL    Eosinophil Absolute 0.23 0.00 - 0.70 x10(3) uL    Basophil Absolute 0.04 0.00 - 0.20 x10(3) uL    Immature Granulocyte Absolute 0.01 0.00 - 1.00 x10(3) u

## (undated) NOTE — LETTER
Patient: Robbert Gosselin   YOB: 1945   Date of Visit: 1/13/2021     Dear  Dr. Lydia Arias MD,    Thank you for referring Robbert Gosselin to my practice. Please find my assessment and plan below.           Chronic cholecysti

## (undated) NOTE — MR AVS SNAPSHOT
[address] [address]  [phone]               Thank you for choosing us for your health care visit with Hortensia Harrell MD.  We are glad to serve you and happy to provide you with this summar Take 1 tablet (81 mg total) by mouth daily. What changed:  Another medication with the same name was removed. Continue taking this medication, and follow the directions you see here.            azithromycin 250 MG Tabs   Take two tablets by mouth today, t EAT THESE FOODS MORE OFTEN: EAT THESE FOODS LESS OFTEN:   Make half your plate fruits and vegetables Highly refined, white starches including white bread, rice and pasta   Eat plenty of protein, keep the fat content low Sugars:  sodas and sports drinks, ca

## (undated) NOTE — LETTER
June 22, 2020     Umberto Ortiz  [address]      Dear Vera Myers:    Below are the results from your recent visit:    Resulted Orders   BASIC METABOLIC PANEL (8)   Result Value Ref Range    Glucose 106 (H) 70 - 99 mg/dL

## (undated) NOTE — LETTER
September 26, 2017    Marcial Marcum MD  [facility]     Patient: Criss Boston   YOB: 1945   Date of Visit: 9/26/2017       Dear Dr. Jenniffer Belle MD:    Thank you for referring Tigre Zavala to me for evaluat Saline Nasal Spray (SALINE MIST) 0.65 % Nasal Solution 1 spray by Nasal route as needed for congestion. Disp:  Rfl:    Nebivolol HCl (BYSTOLIC) 10 MG Oral Tab Take 20 mg by mouth daily.  Disp:  Rfl:    Multiple Vitamins-Minerals (MULTI-VITAMIN/MINERALS) Dorota Mcgovern Periphery Normal Normal            Refraction     Wearing Rx       Sphere Cylinder Axis Add    Right +1.75 +1.00 003 +2.50    Left +1.50 +0.50 014 +2.50    Type:  Progressive bifocal          Manifest Refraction       Sphere Cylinder [place] Add Near

## (undated) NOTE — LETTER
January 11, 2020     Mamejeovanny Ortiz  [address] [place] [id number]      Dear Terrance Whittaker:    Below are the results from your recent visit:    Resulted Orders   US ART LOWER EXT BILAT DOPPLER W SEG PRESSURES (NSZ=73165)    Narrative       RI

## (undated) NOTE — LETTER
January 13, 2020     Naun Ortiz  [address]      Dear Ely Heads:    Below are the results from your recent visit:    Resulted Orders   XR HIP W OR WO PELVIS 2 OR 3 VIEWS, LEFT (CPT=73502)    Narrative    PROCEDURE:

## (undated) NOTE — LETTER
January 6, 2020     [facility]      Dear Charley Us:    Below are the results from your recent visit:    Resulted Orders   COMP METABOLIC PANEL (14)   Result Value Ref Range    Glucose 103 (H) 70 - 99 mg/dL    [place] Monocyte Absolute 0.61 0.10 - 1.00 x10(3) uL    Eosinophil Absolute 0.42 0.00 - 0.70 x10(3) uL    Basophil Absolute 0.06 0.00 - 0.20 x10(3) uL    Immature Granulocyte Absolute 0.02 0.00 - 1.00 x10(3) uL    Neutrophil % 64.8 %    Lymphocyte % 17.5 %    Mon